# Patient Record
Sex: FEMALE | Race: WHITE | NOT HISPANIC OR LATINO | Employment: FULL TIME | URBAN - METROPOLITAN AREA
[De-identification: names, ages, dates, MRNs, and addresses within clinical notes are randomized per-mention and may not be internally consistent; named-entity substitution may affect disease eponyms.]

---

## 2020-05-19 ENCOUNTER — CLINICAL SUPPORT (OUTPATIENT)
Dept: CARDIOLOGY | Facility: CLINIC | Age: 60
End: 2020-05-19
Attending: INTERNAL MEDICINE
Payer: COMMERCIAL

## 2020-05-19 ENCOUNTER — LAB VISIT (OUTPATIENT)
Dept: LAB | Facility: HOSPITAL | Age: 60
End: 2020-05-19
Attending: INTERNAL MEDICINE
Payer: COMMERCIAL

## 2020-05-19 ENCOUNTER — IMMUNIZATION (OUTPATIENT)
Dept: PHARMACY | Facility: CLINIC | Age: 60
End: 2020-05-19
Payer: COMMERCIAL

## 2020-05-19 ENCOUNTER — OFFICE VISIT (OUTPATIENT)
Dept: PRIMARY CARE CLINIC | Facility: CLINIC | Age: 60
End: 2020-05-19
Payer: COMMERCIAL

## 2020-05-19 VITALS
HEART RATE: 80 BPM | BODY MASS INDEX: 28.88 KG/M2 | WEIGHT: 163 LBS | SYSTOLIC BLOOD PRESSURE: 200 MMHG | HEIGHT: 63 IN | DIASTOLIC BLOOD PRESSURE: 120 MMHG

## 2020-05-19 VITALS
TEMPERATURE: 98 F | DIASTOLIC BLOOD PRESSURE: 120 MMHG | SYSTOLIC BLOOD PRESSURE: 200 MMHG | HEIGHT: 63 IN | OXYGEN SATURATION: 99 % | RESPIRATION RATE: 18 BRPM | WEIGHT: 163.38 LBS | HEART RATE: 88 BPM | BODY MASS INDEX: 28.95 KG/M2

## 2020-05-19 DIAGNOSIS — Z20.822 EXPOSURE TO COVID-19 VIRUS: ICD-10-CM

## 2020-05-19 DIAGNOSIS — Z12.31 SCREENING MAMMOGRAM, ENCOUNTER FOR: ICD-10-CM

## 2020-05-19 DIAGNOSIS — Z11.59 NEED FOR HEPATITIS C SCREENING TEST: ICD-10-CM

## 2020-05-19 DIAGNOSIS — Z78.0 POSTMENOPAUSAL: ICD-10-CM

## 2020-05-19 DIAGNOSIS — Z11.4 ENCOUNTER FOR SCREENING FOR HIV: ICD-10-CM

## 2020-05-19 DIAGNOSIS — Z00.00 NORMAL PHYSICAL EXAM, ROUTINE: ICD-10-CM

## 2020-05-19 DIAGNOSIS — Z12.4 ENCOUNTER FOR PAPANICOLAOU SMEAR FOR CERVICAL CANCER SCREENING: ICD-10-CM

## 2020-05-19 DIAGNOSIS — Z23 NEED FOR TDAP VACCINATION: ICD-10-CM

## 2020-05-19 DIAGNOSIS — E66.3 OVERWEIGHT (BMI 25.0-29.9): ICD-10-CM

## 2020-05-19 DIAGNOSIS — Z13.220 SCREENING FOR LIPOID DISORDERS: ICD-10-CM

## 2020-05-19 DIAGNOSIS — Q24.9 CONGENITAL HEART ANOMALY: ICD-10-CM

## 2020-05-19 DIAGNOSIS — I16.0 HYPERTENSIVE URGENCY: Primary | ICD-10-CM

## 2020-05-19 LAB
ALBUMIN SERPL BCP-MCNC: 4.5 G/DL (ref 3.5–5.2)
ALP SERPL-CCNC: 91 U/L (ref 55–135)
ALT SERPL W/O P-5'-P-CCNC: 14 U/L (ref 10–44)
ANION GAP SERPL CALC-SCNC: 11 MMOL/L (ref 8–16)
ASCENDING AORTA: 2.94 CM
AST SERPL-CCNC: 22 U/L (ref 10–40)
AV INDEX (PROSTH): 0.79
AV MEAN GRADIENT: 7 MMHG
AV PEAK GRADIENT: 9 MMHG
AV VALVE AREA: 3.35 CM2
AV VELOCITY RATIO: 0.73
BASOPHILS # BLD AUTO: 0.04 K/UL (ref 0–0.2)
BASOPHILS NFR BLD: 0.5 % (ref 0–1.9)
BILIRUB SERPL-MCNC: 0.3 MG/DL (ref 0.1–1)
BSA FOR ECHO PROCEDURE: 1.81 M2
BUN SERPL-MCNC: 21 MG/DL (ref 8–23)
CALCIUM SERPL-MCNC: 9.7 MG/DL (ref 8.7–10.5)
CHLORIDE SERPL-SCNC: 106 MMOL/L (ref 95–110)
CHOLEST SERPL-MCNC: 253 MG/DL (ref 120–199)
CHOLEST/HDLC SERPL: 3.2 {RATIO} (ref 2–5)
CO2 SERPL-SCNC: 26 MMOL/L (ref 23–29)
CREAT SERPL-MCNC: 1.1 MG/DL (ref 0.5–1.4)
CV ECHO LV RWT: 0.52 CM
DIFFERENTIAL METHOD: ABNORMAL
DOP CALC AO PEAK VEL: 1.51 M/S
DOP CALC AO VTI: 30.8 CM
DOP CALC LVOT AREA: 4.3 CM2
DOP CALC LVOT DIAMETER: 2.33 CM
DOP CALC LVOT PEAK VEL: 1.1 M/S
DOP CALC LVOT STROKE VOLUME: 103.13 CM3
DOP CALCLVOT PEAK VEL VTI: 24.2 CM
E WAVE DECELERATION TIME: 220.79 MSEC
E/A RATIO: 0.62
E/E' RATIO: 11.09 M/S
ECHO LV POSTERIOR WALL: 0.91 CM (ref 0.6–1.1)
EOSINOPHIL # BLD AUTO: 0.1 K/UL (ref 0–0.5)
EOSINOPHIL NFR BLD: 1.1 % (ref 0–8)
ERYTHROCYTE [DISTWIDTH] IN BLOOD BY AUTOMATED COUNT: 12.5 % (ref 11.5–14.5)
EST. GFR  (AFRICAN AMERICAN): >60 ML/MIN/1.73 M^2
EST. GFR  (NON AFRICAN AMERICAN): 53.9 ML/MIN/1.73 M^2
FRACTIONAL SHORTENING: 37 % (ref 28–44)
GLUCOSE SERPL-MCNC: 98 MG/DL (ref 70–110)
HCT VFR BLD AUTO: 46.1 % (ref 37–48.5)
HDLC SERPL-MCNC: 78 MG/DL (ref 40–75)
HDLC SERPL: 30.8 % (ref 20–50)
HGB BLD-MCNC: 14.2 G/DL (ref 12–16)
IMM GRANULOCYTES # BLD AUTO: 0.02 K/UL (ref 0–0.04)
IMM GRANULOCYTES NFR BLD AUTO: 0.2 % (ref 0–0.5)
INTERVENTRICULAR SEPTUM: 1.1 CM (ref 0.6–1.1)
IVRT: 134.16 MSEC
LA MAJOR: 5.16 CM
LA MINOR: 5.25 CM
LA WIDTH: 4.19 CM
LDLC SERPL CALC-MCNC: 161.6 MG/DL (ref 63–159)
LEFT ATRIUM SIZE: 3.04 CM
LEFT ATRIUM VOLUME INDEX: 31.8 ML/M2
LEFT ATRIUM VOLUME: 56.35 CM3
LEFT INTERNAL DIMENSION IN SYSTOLE: 2.2 CM (ref 2.1–4)
LEFT VENTRICLE DIASTOLIC VOLUME INDEX: 28.24 ML/M2
LEFT VENTRICLE DIASTOLIC VOLUME: 50.06 ML
LEFT VENTRICLE MASS INDEX: 58 G/M2
LEFT VENTRICLE SYSTOLIC VOLUME INDEX: 9.1 ML/M2
LEFT VENTRICLE SYSTOLIC VOLUME: 16.11 ML
LEFT VENTRICULAR INTERNAL DIMENSION IN DIASTOLE: 3.48 CM (ref 3.5–6)
LEFT VENTRICULAR MASS: 103.21 G
LV LATERAL E/E' RATIO: 10.17 M/S
LV SEPTAL E/E' RATIO: 12.2 M/S
LYMPHOCYTES # BLD AUTO: 2.3 K/UL (ref 1–4.8)
LYMPHOCYTES NFR BLD: 27.1 % (ref 18–48)
MCH RBC QN AUTO: 28.7 PG (ref 27–31)
MCHC RBC AUTO-ENTMCNC: 30.8 G/DL (ref 32–36)
MCV RBC AUTO: 93 FL (ref 82–98)
MONOCYTES # BLD AUTO: 0.7 K/UL (ref 0.3–1)
MONOCYTES NFR BLD: 8.1 % (ref 4–15)
MV PEAK A VEL: 0.98 M/S
MV PEAK E VEL: 0.61 M/S
NEUTROPHILS # BLD AUTO: 5.3 K/UL (ref 1.8–7.7)
NEUTROPHILS NFR BLD: 63 % (ref 38–73)
NONHDLC SERPL-MCNC: 175 MG/DL
NRBC BLD-RTO: 0 /100 WBC
PISA TR MAX VEL: 2.26 M/S
PLATELET # BLD AUTO: 220 K/UL (ref 150–350)
PMV BLD AUTO: 13.7 FL (ref 9.2–12.9)
POTASSIUM SERPL-SCNC: 3.9 MMOL/L (ref 3.5–5.1)
PROT SERPL-MCNC: 7.9 G/DL (ref 6–8.4)
PULM VEIN S/D RATIO: 2.21
PV PEAK D VEL: 0.39 M/S
PV PEAK S VEL: 0.86 M/S
RA MAJOR: 4.28 CM
RA PRESSURE: 3 MMHG
RA WIDTH: 3.31 CM
RBC # BLD AUTO: 4.95 M/UL (ref 4–5.4)
RIGHT VENTRICULAR END-DIASTOLIC DIMENSION: 4.1 CM
SARS-COV-2 IGG SERPLBLD QL IA.RAPID: NEGATIVE
SINUS: 3.09 CM
SODIUM SERPL-SCNC: 143 MMOL/L (ref 136–145)
STJ: 2.6 CM
T4 FREE SERPL-MCNC: 1.3 NG/DL (ref 0.71–1.51)
TDI LATERAL: 0.06 M/S
TDI SEPTAL: 0.05 M/S
TDI: 0.06 M/S
TR MAX PG: 20 MMHG
TRICUSPID ANNULAR PLANE SYSTOLIC EXCURSION: 1.53 CM
TRIGL SERPL-MCNC: 67 MG/DL (ref 30–150)
TSH SERPL DL<=0.005 MIU/L-ACNC: 2.38 UIU/ML (ref 0.4–4)
TV REST PULMONARY ARTERY PRESSURE: 23 MMHG
WBC # BLD AUTO: 8.38 K/UL (ref 3.9–12.7)

## 2020-05-19 PROCEDURE — 3077F SYST BP >= 140 MM HG: CPT | Mod: CPTII,S$GLB,, | Performed by: INTERNAL MEDICINE

## 2020-05-19 PROCEDURE — 84439 ASSAY OF FREE THYROXINE: CPT

## 2020-05-19 PROCEDURE — 85025 COMPLETE CBC W/AUTO DIFF WBC: CPT

## 2020-05-19 PROCEDURE — 3077F PR MOST RECENT SYSTOLIC BLOOD PRESSURE >= 140 MM HG: ICD-10-PCS | Mod: CPTII,S$GLB,, | Performed by: INTERNAL MEDICINE

## 2020-05-19 PROCEDURE — 86803 HEPATITIS C AB TEST: CPT

## 2020-05-19 PROCEDURE — 99204 OFFICE O/P NEW MOD 45 MIN: CPT | Mod: S$GLB,,, | Performed by: INTERNAL MEDICINE

## 2020-05-19 PROCEDURE — 86703 HIV-1/HIV-2 1 RESULT ANTBDY: CPT

## 2020-05-19 PROCEDURE — 3008F BODY MASS INDEX DOCD: CPT | Mod: CPTII,S$GLB,, | Performed by: INTERNAL MEDICINE

## 2020-05-19 PROCEDURE — 93306 TTE W/DOPPLER COMPLETE: CPT | Mod: S$GLB,,, | Performed by: INTERNAL MEDICINE

## 2020-05-19 PROCEDURE — 93306 ECHO (CUPID ONLY): ICD-10-PCS | Mod: S$GLB,,, | Performed by: INTERNAL MEDICINE

## 2020-05-19 PROCEDURE — 80053 COMPREHEN METABOLIC PANEL: CPT

## 2020-05-19 PROCEDURE — 3008F PR BODY MASS INDEX (BMI) DOCUMENTED: ICD-10-PCS | Mod: CPTII,S$GLB,, | Performed by: INTERNAL MEDICINE

## 2020-05-19 PROCEDURE — 36415 COLL VENOUS BLD VENIPUNCTURE: CPT | Mod: PN

## 2020-05-19 PROCEDURE — 99999 PR PBB SHADOW E&M-EST. PATIENT-LVL II: ICD-10-PCS | Mod: PBBFAC,,,

## 2020-05-19 PROCEDURE — 3080F PR MOST RECENT DIASTOLIC BLOOD PRESSURE >= 90 MM HG: ICD-10-PCS | Mod: CPTII,S$GLB,, | Performed by: INTERNAL MEDICINE

## 2020-05-19 PROCEDURE — 3080F DIAST BP >= 90 MM HG: CPT | Mod: CPTII,S$GLB,, | Performed by: INTERNAL MEDICINE

## 2020-05-19 PROCEDURE — 80061 LIPID PANEL: CPT

## 2020-05-19 PROCEDURE — 99999 PR PBB SHADOW E&M-EST. PATIENT-LVL II: CPT | Mod: PBBFAC,,,

## 2020-05-19 PROCEDURE — 84443 ASSAY THYROID STIM HORMONE: CPT

## 2020-05-19 PROCEDURE — 99999 PR PBB SHADOW E&M-EST. PATIENT-LVL V: ICD-10-PCS | Mod: PBBFAC,,, | Performed by: INTERNAL MEDICINE

## 2020-05-19 PROCEDURE — 99204 PR OFFICE/OUTPT VISIT, NEW, LEVL IV, 45-59 MIN: ICD-10-PCS | Mod: S$GLB,,, | Performed by: INTERNAL MEDICINE

## 2020-05-19 PROCEDURE — 99999 PR PBB SHADOW E&M-EST. PATIENT-LVL V: CPT | Mod: PBBFAC,,, | Performed by: INTERNAL MEDICINE

## 2020-05-19 PROCEDURE — 86769 SARS-COV-2 COVID-19 ANTIBODY: CPT

## 2020-05-19 RX ORDER — MAGNESIUM 200 MG
TABLET ORAL
COMMUNITY
Start: 2019-05-01 | End: 2020-05-19

## 2020-05-19 RX ORDER — ACETAMINOPHEN 500 MG
TABLET ORAL
COMMUNITY
Start: 2019-05-01 | End: 2020-05-19

## 2020-05-19 RX ORDER — AMLODIPINE BESYLATE 10 MG/1
10 TABLET ORAL DAILY
Qty: 30 TABLET | Refills: 0 | Status: SHIPPED | OUTPATIENT
Start: 2020-05-19 | End: 2020-06-14 | Stop reason: SDUPTHER

## 2020-05-19 NOTE — PROGRESS NOTES
Reviewed Echo.  Called pt.  No answer.  Left message to return call.  I would like to d/w pt  · Concentric left ventricular remodeling.  · Normal left ventricular systolic function. The estimated ejection fraction is 65%.  · Grade I (mild) left ventricular diastolic dysfunction consistent with impaired relaxation.  · Mild tricuspid regurgitation.  · Mildly reduced right ventricular systolic function.  · Mild right ventricular enlargement.  · With note of potential ASD repair - inter-atrial septum does appear thickened and hyperechoic which could represent repair. No flow by color doppler. No evidence of significant RV dilation or dysfunction.    Dr. UGARTE

## 2020-05-19 NOTE — PROGRESS NOTES
[facility] Note    Chief Complaint      Chief Complaint   Patient presents with    Hypertension       History of Present Illness      Cary Claudio is a 60 y.o. HTN, Anemia, Duodenal ulcer presents to est PCP and to fu HTN.     Pt requests COVID Ab test because she had AH and rash Robert without fever or other symptoms.  I will put in an order.  Pt aware this may or may not be covered by her insurance. Also, a positive result does not meanshe can't get COVID -19 again.  This virus is very new and we are still learning about it.  There is not enough information at this time to determine what defines COVID - 19 immunity and how long immunity would last.  She should still socially distance. A positive result indicates she has had the virus    HTN - Pt was prev on Norvasc 10 mg/d, HCTZ 25 mg, lisinopril 30 mg/d. She ran out 5 yrs ago when she lost her insurance. Will resume Norvasc. Check labs today. Will likely need to add Lisinopril back.  Monitor for any swelling.  Rec low sodium diet.     Anemia - Will check CBC.  This was when she had bleeding ulcers in past?    Duodenal ulcer - Pt prev on Omeprazole. Rec Avoid NSAIDS.     Overweight  BMI - 28.9 - Pt walks her dog most days and does some madalyn 4-5 times/wk. She plays tennis intermittently.    HCM - Flu - none;  Tdap - due- admin 5/19/20;  PCV 13 - none;  PVX 23 - none;  Shingrix - none; Zostavax - 10/8/14;  MGM - due;  DEXA - due;  PAP - > 5 yrs - due; pt s/p partial Hys; Hep C Screen - ;  HIV Screen - ; C-scope - 11/21/14 - repeat 10 yrs; Prev PCP - Dr. Rutledge; [facility] [place]    Past Medical History:  Past Medical History:   Diagnosis Date    Cardiac anomaly, congenital     pt had a repair for ?ASD '98 at [facility] with Dr. Aguilar    Congenital heart anomaly     Fibroids     uterine     History of migraine     Hypertension     Insomnia     Nicotine dependence in remission     PUD (peptic ulcer disease)        Past  "Surgical History:   has a past surgical history that includes repair of congenital heart disease (age 38 yrs ).    Family History:  family history includes Diabetes in her brother; Heart disease in her father and mother; Hypertension in her mother; Rheum arthritis in her sister.     Social History:  Social History     Tobacco Use    Smoking status: Former Smoker     Types: Cigarettes     Last attempt to quit: 7/8/2004     Years since quitting: 15.8    Smokeless tobacco: Never Used    Tobacco comment: a few cig on occasion x 2 yrs   Substance Use Topics    Alcohol use: Yes     Alcohol/week: 5.0 standard drinks     Types: 5 Glasses of wine per week     Frequency: 4 or more times a week     Drinks per session: 1 or 2     Binge frequency: Never    Drug use: No       Review of Systems   Constitutional: Positive for malaise/fatigue. Negative for chills, diaphoresis and fever.   HENT: Negative for congestion, hearing loss, sore throat and tinnitus.    Eyes: Positive for blurred vision. Negative for double vision.        Wears glasses.  Worse with elevated BP. Last saw Ophtho 1 yr ago.    Respiratory: Positive for shortness of breath. Negative for cough and wheezing.         "If I walk a lot".  She can walk a few miles and gets SOB at the beginning which self resolves.    Cardiovascular: Positive for chest pain. Negative for palpitations, orthopnea, leg swelling and PND.   Gastrointestinal: Negative for abdominal pain, blood in stool, constipation, diarrhea, heartburn, melena, nausea and vomiting.   Genitourinary: Negative for dysuria and frequency.   Musculoskeletal: Positive for back pain. Negative for joint pain, myalgias and neck pain.        Hands and feet swell intermittently. Low back pain - intermittently.  Does not radiate.   Skin: Negative for itching and rash.   Neurological: Positive for headaches. Negative for dizziness and tingling.        1 HA/wk x  1 hr.   Endo/Heme/Allergies: Does not bruise/bleed " easily.   Psychiatric/Behavioral: Positive for depression. The patient is nervous/anxious.         Anxiety > Depression given COVID pandemic and due to work environment. She does not feel she needs further intervention at this time. She has trouble sleeping x a couple yrs.  Has good sleep hygiene        Medications:  Outpatient Encounter Medications as of 5/19/2020   Medication Sig Dispense Refill    amLODIPine (NORVASC) 10 MG tablet Take 1 tablet (10 mg total) by mouth once daily. 30 tablet 0    hydrochlorothiazide (HYDRODIURIL) 25 MG tablet TAKE 1 TABLET BY MOUTH ONCE DAILY (Patient not taking: Reported on 5/19/2020) 90 tablet 0    lisinopril (PRINIVIL,ZESTRIL) 30 MG tablet TAKE 1 TABLET BY MOUTH ONCE DAILY (Patient not taking: Reported on 5/19/2020) 30 tablet 3    [DISCONTINUED] amlodipine (NORVASC) 10 MG tablet TAKE 1 TABLET BY MOUTH DAILY (Patient not taking: Reported on 5/19/2020) 90 tablet 0    [DISCONTINUED] cholecalciferol, vitamin D3, (VITAMIN D3) 125 mcg (5,000 unit) Tab       [DISCONTINUED] magnesium 200 mg Tab       [DISCONTINUED] omeprazole (PRILOSEC) 40 MG capsule Take 1 capsule (40 mg total) by mouth once daily. (Patient not taking: Reported on 5/19/2020) 30 capsule 11    [DISCONTINUED] rye grass extract-quercetin 500-250 mg Tab       [DISCONTINUED] VITAMIN K2 ORAL        No facility-administered encounter medications on file as of 5/19/2020.        Current Outpatient Medications:     amLODIPine (NORVASC) 10 MG tablet, Take 1 tablet (10 mg total) by mouth once daily., Disp: 30 tablet, Rfl: 0    diphth,pertus,acell,,tetanus (BOOSTRIX TDAP) 2.5-8-5 Lf-mcg-Lf/0.5mL Syrg injection, Inject 0.5 mLs into the muscle once. For one dose. for 1 dose, Disp: 0.5 mL, Rfl: 0    hydrochlorothiazide (HYDRODIURIL) 25 MG tablet, TAKE 1 TABLET BY MOUTH ONCE DAILY (Patient not taking: Reported on 5/19/2020), Disp: 90 tablet, Rfl: 0    lisinopril (PRINIVIL,ZESTRIL) 30 MG tablet, TAKE 1 TABLET BY MOUTH ONCE  "DAILY (Patient not taking: Reported on 5/19/2020), Disp: 30 tablet, Rfl: 3    Allergies:  Review of patient's allergies indicates:   Allergen Reactions    Compazine [prochlorperazine] Swelling     Tongue swelling    Pcn [penicillins] Swelling     Tongue swelling       Health Maintenance:  Immunization History   Administered Date(s) Administered    Influenza - Quadrivalent 10/08/2014    Tdap 05/19/2020    Zoster 10/08/2014      Health Maintenance   Topic Date Due    Hepatitis C Screening  1960    TETANUS VACCINE  02/23/1976    Pap Smear with HPV Cotest  02/23/1979    Mammogram  02/23/1998    Lipid Panel  07/08/2015    DEXA SCAN  12/31/2016    Colonoscopy  11/21/2024      Objective:      Vital Signs  Temp: 98 °F (36.7 °C)  Pulse: 88  Resp: 18  SpO2: 99 %  BP: (!) 200/120  BP Location: Left arm  Patient Position: Sitting  Pain Score: 0-No pain  Height and Weight  Height: 5' 3" (160 cm)  Weight: 74.1 kg (163 lb 5.8 oz)  BSA (Calculated - sq m): 1.81 sq meters  BMI (Calculated): 28.9  Weight in (lb) to have BMI = 25: 140.8]    Laboratory:      Other:   Lab Results   Component Value Date    FERRITIN 35 12/29/2014       Physical Exam   Constitutional: She is oriented to person, place, and time. She appears well-developed and well-nourished. No distress.   HENT:   Head: Normocephalic and atraumatic.   Right Ear: External ear normal.   Left Ear: External ear normal.   Eyes: Pupils are equal, round, and reactive to light. Conjunctivae and EOM are normal.   Neck: Normal range of motion. Neck supple. No thyromegaly present.   Cardiovascular: Normal rate, regular rhythm, normal heart sounds and intact distal pulses.   No murmur heard.  Pulmonary/Chest: Effort normal and breath sounds normal. No respiratory distress.   Abdominal: Soft. Bowel sounds are normal. She exhibits no distension.   Musculoskeletal: Normal range of motion.   Neurological: She is alert and oriented to person, place, and time.   Skin: Skin " is warm and dry. She is not diaphoretic.   Psychiatric: She has a normal mood and affect.   Nursing note and vitals reviewed.          Assessment:       1. Hypertensive urgency    2. Overweight (BMI 25.0-29.9)    3. Congenital heart anomaly    4. Postmenopausal    5. Need for Tdap vaccination    6. Exposure to Covid-19 Virus    7. Encounter for screening for HIV    8. Need for hepatitis C screening test    9. Screening mammogram, encounter for    10. Encounter for Papanicolaou smear for cervical cancer screening    11. Normal physical exam, routine    12. Screening for lipoid disorders        Cary Claudio is a 60 y.o.female with:    1. Hypertensive urgency  - amLODIPine (NORVASC) 10 MG tablet; Take 1 tablet (10 mg total) by mouth once daily.  Dispense: 30 tablet; Refill: 0  -rec low sodium diet.  Will resume Norvasc 10 mg/d.  Monitor for edema.  Pt will call with any concerns.  Will check renal function.  RTC in 2 wks.  Keep BP log.  Will likely need to add 2nd agent back such as HCTZ or Lisinopril.     2. Overweight (BMI 25.0-29.9)  -Rec diet and cont walking.     3. Congenital heart anomaly  - Echo Color Flow Doppler? Yes; Future  -Will check Echo.  Pt unsure of prev Surgical repair.  ?ASD repair. She reports some intermittent DAN at the beginning of activity    4. Postmenopausal  - DXA Bone Density Spine And Hip; Future  -Check DEXA.     5. Need for Tdap vaccination  -Admin today.     6. Exposure to Covid-19 Virus  - COVID-19 (SARS CoV-2) IgG Antibody; Future  -I  put in an order.  Pt aware a positive result does not mean you can't get COVID -19 again.  This virus is very new and we are still learning about it.  There is not enough information at this time to determine what defines COVID - 19 immunity and how long immunity would last.  She should still socially distance. A positive result indicates she has had the virus    7. Encounter for screening for HIV  - HIV 1/2 Ag/Ab (4th Gen); Future    8. Need for  hepatitis C screening test  - Hepatitis C Antibody; Future    9. Screening mammogram, encounter for  - Mammo Digital Screening Bilat; Future    10. Encounter for Papanicolaou smear for cervical cancer screening  - Ambulatory referral/consult to Obstetrics / Gynecology; Future  -Refer to OB for PAP.     Other orders  - CBC auto differential; Future  - Comprehensive metabolic panel; Future  - T4, free; Future  - TSH; Future  - Lipid Panel; Future       Chronic conditions status updated as per HPI.  Other than changes above, cont current medications and maintain follow up with specialists.  Return to clinic in 2 wks    Nicole Rich MD  [facility]                Answers for HPI/ROS submitted by the patient on 5/15/2020   Hypertension  Chronicity: chronic  Onset: more than 1 year ago  Progression since onset: gradually worsening  Condition status: uncontrolled  anxiety: Yes  peripheral edema: No  sweats: No  Agents associated with hypertension: NSAIDs  CAD risks: family history, obesity, post-menopausal state, stress  Compliance problems: medication cost, medication side effects  Past treatments: beta blockers, diuretics  Improvement on treatment: mild

## 2020-05-20 ENCOUNTER — TELEPHONE (OUTPATIENT)
Dept: PRIMARY CARE CLINIC | Facility: CLINIC | Age: 60
End: 2020-05-20

## 2020-05-20 LAB
HCV AB SERPL QL IA: NEGATIVE
HIV 1+2 AB+HIV1 P24 AG SERPL QL IA: NEGATIVE

## 2020-05-20 NOTE — TELEPHONE ENCOUNTER
----- Message from Nicole Rich MD sent at 5/19/2020  5:48 PM CDT -----  Reviewed Echo.  Called pt.  No answer.  Left message to return call.  I would like to d/w pt  · Concentric left ventricular remodeling.  · Normal left ventricular systolic function. The estimated ejection fraction is 65%.  · Grade I (mild) left ventricular diastolic dysfunction consistent with impaired relaxation.  · Mild tricuspid regurgitation.  · Mildly reduced right ventricular systolic function.  · Mild right ventricular enlargement.  · With note of potential ASD repair - inter-atrial septum does appear thickened and hyperechoic which could represent repair. No flow by color doppler. No evidence of significant RV dilation or dysfunction.     Dr. UGARTE

## 2020-05-20 NOTE — PROGRESS NOTES
I d/w pt.  Her Hep C screen - neg.  HIV - neg.  TFT's - wnl.  TG 67 HDL 78 .  Rec low chol diet. Repeat in 4 mos.  Consider statin if still elevated. Renal function sl low BUN/Cr - 21/1.1.  I rec inc hydration.  COVID ab - neg.     Dr. UGARTE

## 2020-06-01 ENCOUNTER — OFFICE VISIT (OUTPATIENT)
Dept: OBSTETRICS AND GYNECOLOGY | Facility: CLINIC | Age: 60
End: 2020-06-01
Payer: COMMERCIAL

## 2020-06-01 ENCOUNTER — LAB VISIT (OUTPATIENT)
Dept: LAB | Facility: HOSPITAL | Age: 60
End: 2020-06-01
Attending: OBSTETRICS & GYNECOLOGY
Payer: COMMERCIAL

## 2020-06-01 VITALS
DIASTOLIC BLOOD PRESSURE: 92 MMHG | WEIGHT: 163.38 LBS | HEIGHT: 63 IN | SYSTOLIC BLOOD PRESSURE: 172 MMHG | BODY MASS INDEX: 28.95 KG/M2

## 2020-06-01 DIAGNOSIS — B37.9 CANDIDIASIS: ICD-10-CM

## 2020-06-01 DIAGNOSIS — N95.1 SYMPTOMATIC MENOPAUSAL OR FEMALE CLIMACTERIC STATES: ICD-10-CM

## 2020-06-01 DIAGNOSIS — Z12.4 ENCOUNTER FOR PAPANICOLAOU SMEAR FOR CERVICAL CANCER SCREENING: Primary | ICD-10-CM

## 2020-06-01 PROCEDURE — 87624 HPV HI-RISK TYP POOLED RSLT: CPT

## 2020-06-01 PROCEDURE — 99386 PREV VISIT NEW AGE 40-64: CPT | Mod: S$GLB,,, | Performed by: OBSTETRICS & GYNECOLOGY

## 2020-06-01 PROCEDURE — 99386 PR PREVENTIVE VISIT,NEW,40-64: ICD-10-PCS | Mod: S$GLB,,, | Performed by: OBSTETRICS & GYNECOLOGY

## 2020-06-01 PROCEDURE — 3080F PR MOST RECENT DIASTOLIC BLOOD PRESSURE >= 90 MM HG: ICD-10-PCS | Mod: CPTII,S$GLB,, | Performed by: OBSTETRICS & GYNECOLOGY

## 2020-06-01 PROCEDURE — 3077F SYST BP >= 140 MM HG: CPT | Mod: CPTII,S$GLB,, | Performed by: OBSTETRICS & GYNECOLOGY

## 2020-06-01 PROCEDURE — 88175 CYTOPATH C/V AUTO FLUID REDO: CPT

## 2020-06-01 PROCEDURE — 84403 ASSAY OF TOTAL TESTOSTERONE: CPT

## 2020-06-01 PROCEDURE — 3080F DIAST BP >= 90 MM HG: CPT | Mod: CPTII,S$GLB,, | Performed by: OBSTETRICS & GYNECOLOGY

## 2020-06-01 PROCEDURE — 99999 PR PBB SHADOW E&M-EST. PATIENT-LVL III: ICD-10-PCS | Mod: PBBFAC,,, | Performed by: OBSTETRICS & GYNECOLOGY

## 2020-06-01 PROCEDURE — 84402 ASSAY OF FREE TESTOSTERONE: CPT

## 2020-06-01 PROCEDURE — 3077F PR MOST RECENT SYSTOLIC BLOOD PRESSURE >= 140 MM HG: ICD-10-PCS | Mod: CPTII,S$GLB,, | Performed by: OBSTETRICS & GYNECOLOGY

## 2020-06-01 PROCEDURE — 99999 PR PBB SHADOW E&M-EST. PATIENT-LVL III: CPT | Mod: PBBFAC,,, | Performed by: OBSTETRICS & GYNECOLOGY

## 2020-06-01 PROCEDURE — 82670 ASSAY OF TOTAL ESTRADIOL: CPT

## 2020-06-01 RX ORDER — FLUCONAZOLE 150 MG/1
150 TABLET ORAL DAILY
Qty: 5 TABLET | Refills: 0 | Status: SHIPPED | OUTPATIENT
Start: 2020-06-01 | End: 2020-06-06

## 2020-06-01 NOTE — PROGRESS NOTES
[facility] Note    Chief Complaint      Chief Complaint   Patient presents with    Follow-up       History of Present Illness      Cary Claudio is a 60 y.o.  HTN, Anemia, Duodenal ulcer presents to fu HTN. Last visit -5/19/20    HLD -  TG 67 HDL 78 .  Rec low chol diet. Repeat in 4 mos. Consider statin if still elevated.     Echo -5/19/20 -  Concentric left ventricular remodeling, EF - 65%. Grade I (mild) left ventricular diastolic dysfunction consistent with impaired relaxation. Mild TR. Mildly reduced right ventricular systolic function. Mild Ventricular Enlargement. Note - potential ASD repair - inter-atrial septum does appear thickened and hyperechoic which could represent repair. No flow by color doppler. No evidence of significant RV dilation or dysfunction.     HTN - Pt was prev on Norvasc 10 mg/d, HCTZ 25 mg, lisinopril 30 mg/d. She ran out 5 yrs ago when she lost her insurance. Pt currently on Norvasc 10 mg/d Will  add Lisinopril back at 10 mg/d.  Monitor for any swelling.  Rec low sodium diet. She will fu in 6 wks but will call in 2 wks with BP log. BUN/Cr - 21/1.1.     Anemia - H/H - 14/46. Resolved This was when she had bleeding ulcers in past?     Duodenal ulcer - Pt prev on Omeprazole. Rec Avoid NSAIDS.      Overweight  BMI - 29.1 - Pt walks her dog most days and does some madalyn 4-5 times/wk. She plays tennis intermittently. She gained 30 lb in 4 yrs.  She is planning to get hormone pellets.      HCM - Flu - none;  Tdap - 5/19/20;  PCV 13 - none;  PVX 23 - none;  Shingrix - none; Zostavax - 10/8/14;  MGM - due;  DEXA - due;  PAP - > 5 yrs - due; pt s/p partial Hys; Hep C Screen - neg;  HIV Screen - neg; C-scope - 11/21/14 - repeat 10 yrs; Prev PCP - Dr. Rutledge; [facility] [place]     Past Medical History:  Past Medical History:   Diagnosis Date    Anemia     Cardiac anomaly, congenital     pt had a repair for ?ASD '98 at [facility] with Dr. Aguilar     Congenital heart anomaly     Fibroids     uterine     History of migraine     Hypertension     Insomnia     Nicotine dependence in remission     PUD (peptic ulcer disease)        Past Surgical History:   has a past surgical history that includes repair of congenital heart disease (age 38 yrs ) and Hysterectomy (2014).    Family History:  family history includes Diabetes in her brother; Heart disease in her father and mother; Hypertension in her mother; Rheum arthritis in her sister.     Social History:  Social History     Tobacco Use    Smoking status: Former Smoker     Types: Cigarettes     Last attempt to quit: 7/8/2004     Years since quitting: 15.9    Smokeless tobacco: Never Used    Tobacco comment: a few cig on occasion x 2 yrs   Substance Use Topics    Alcohol use: Yes     Alcohol/week: 5.0 standard drinks     Types: 5 Glasses of wine per week     Frequency: 4 or more times a week     Drinks per session: 1 or 2     Binge frequency: Never    Drug use: No       Review of Systems   Constitutional: Negative for malaise/fatigue.   Eyes: Negative for blurred vision.   Respiratory: Negative for shortness of breath.    Cardiovascular: Negative for chest pain, palpitations, orthopnea and PND.   Gastrointestinal: Negative for nausea and vomiting.   Genitourinary: Negative for dysuria and frequency.   Musculoskeletal: Negative for neck pain.   Neurological: Negative for dizziness and headaches.   Psychiatric/Behavioral: Negative for depression. The patient is not nervous/anxious.         She left hostile work environment recently. Not sleeping so well - rec OTC meltanonin        Medications:  Outpatient Encounter Medications as of 6/2/2020   Medication Sig Dispense Refill    amLODIPine (NORVASC) 10 MG tablet Take 1 tablet (10 mg total) by mouth once daily. 30 tablet 0    fluconazole (DIFLUCAN) 150 MG Tab Take 1 tablet (150 mg total) by mouth once daily. for 5 days (Patient not taking: Reported on  "6/2/2020) 5 tablet 0    lisinopriL 10 MG tablet Take 3 tablets (30 mg total) by mouth once daily. 30 tablet 1    [DISCONTINUED] hydrochlorothiazide (HYDRODIURIL) 25 MG tablet TAKE 1 TABLET BY MOUTH ONCE DAILY (Patient not taking: Reported on 5/19/2020) 90 tablet 0    [DISCONTINUED] lisinopril (PRINIVIL,ZESTRIL) 30 MG tablet TAKE 1 TABLET BY MOUTH ONCE DAILY (Patient not taking: Reported on 5/19/2020) 30 tablet 3     No facility-administered encounter medications on file as of 6/2/2020.        Current Outpatient Medications:     amLODIPine (NORVASC) 10 MG tablet, Take 1 tablet (10 mg total) by mouth once daily., Disp: 30 tablet, Rfl: 0    fluconazole (DIFLUCAN) 150 MG Tab, Take 1 tablet (150 mg total) by mouth once daily. for 5 days (Patient not taking: Reported on 6/2/2020), Disp: 5 tablet, Rfl: 0    lisinopriL 10 MG tablet, Take 3 tablets (30 mg total) by mouth once daily., Disp: 30 tablet, Rfl: 1    Allergies:  Review of patient's allergies indicates:   Allergen Reactions    Compazine [prochlorperazine] Swelling     Tongue swelling    Pcn [penicillins] Swelling     Tongue swelling       Health Maintenance:  Immunization History   Administered Date(s) Administered    Influenza - Quadrivalent 10/08/2014    Tdap 05/19/2020    Zoster 10/08/2014      Health Maintenance   Topic Date Due    Mammogram  02/23/1998    DEXA SCAN  12/31/2016    Lipid Panel  05/19/2021    Colonoscopy  11/21/2024    TETANUS VACCINE  05/19/2030    Hepatitis C Screening  Completed      Objective:      Vital Signs  Pulse: 94  Resp: 18  SpO2: 99 %  BP: (!) 160/81  BP Location: Right arm  Patient Position: Sitting  Pain Score: 0-No pain  Height and Weight  Height: 5' 3" (160 cm)  Weight: 74.6 kg (164 lb 7.4 oz)  BSA (Calculated - sq m): 1.82 sq meters  BMI (Calculated): 29.1  Weight in (lb) to have BMI = 25: 140.8]    Laboratory:  CBC:  Recent Labs   Lab 05/19/20  1125   WBC 8.38   RBC 4.95   Hemoglobin 14.2   Hematocrit 46.1 "   Platelets 220   Mean Corpuscular Volume 93   Mean Corpuscular Hemoglobin 28.7   Mean Corpuscular Hemoglobin Conc 30.8 L       CMP:  Recent Labs   Lab 05/19/20  1125   Glucose 98   Calcium 9.7   Albumin 4.5   Total Protein 7.9   Sodium 143   Potassium 3.9   CO2 26   Chloride 106   BUN, Bld 21   Creatinine 1.1   eGFR if African American >60.0   eGFR if non African American 53.9 A   Alkaline Phosphatase 91   ALT 14   AST 22   Total Bilirubin 0.3       URINALYSIS:              LIPIDS:  Recent Labs   Lab 05/19/20  1125   TSH 2.376   HDL 78 H   Cholesterol 253 H   Triglycerides 67   LDL Cholesterol 161.6 H   Hdl/Cholesterol Ratio 30.8   Non-HDL Cholesterol 175   Total Cholesterol/HDL Ratio 3.2       TSH:  Recent Labs   Lab 05/19/20  1125   TSH 2.376       Other:   Lab Results   Component Value Date    TOTALTESTOST 20 06/01/2020    ESTRADIOL <10 (A) 06/01/2020    FERRITIN 35 12/29/2014     Lab Results   Component Value Date    HEPCAB Negative 05/19/2020       Physical Exam   Constitutional: She appears well-developed and well-nourished. No distress.   Skin: Skin is warm and dry. She is not diaphoretic.   Psychiatric: She has a normal mood and affect.   Nursing note and vitals reviewed.          Assessment:       1. Hypertension, unspecified type    2. Hyperlipidemia, unspecified hyperlipidemia type    3. Overweight (BMI 25.0-29.9)        Cary Claudio is a 60 y.o.female with:    1. Hypertension, unspecified type  - lisinopriL 10 MG tablet; Take 3 tablets (30 mg total) by mouth once daily.  Dispense: 30 tablet; Refill: 1  - Basic metabolic panel; Future  -Improved but uncontrolled.  Cont Amlodipine and monitor for edema.  Will add Lisinopril 10 mg/d to regimen.  Repeat BMP in 2 wks.  Pt will call with BP log in 2 wks.  Fu in 4 wks. Note - pt may be out of insurance by July 1.     2. Hyperlipidemia, unspecified hyperlipidemia type  -Rec low chol diet.  Repeat FLP in 4 mos.     3. Overweight (BMI 25.0-29.9)  -Rec low chol  diet.        Chronic conditions status updated as per HPI.  Other than changes above, cont current medications and maintain follow up with specialists.  Return to clinic in 4 wks or sooner if needed.    Nicole Rich MD  [facility]                Answers for HPI/ROS submitted by the patient on 5/30/2020   Hypertension  Chronicity: chronic  Onset: more than 1 year ago  Progression since onset: gradually improving  Condition status: resistant  anxiety: Yes  peripheral edema: No  sweats: No  Agents associated with hypertension: no associated agents  CAD risks: family history, obesity, post-menopausal state, stress  Compliance problems: medication cost  Past treatments: diuretics, lifestyle changes  Improvement on treatment: mild

## 2020-06-01 NOTE — LETTER
June 1, 2020      Nicole Rich MD  [address] [facility] [address]           [facility] - Obstetrics and Gynecology  [address] [place]  [facility] [address]  Phone: [phone]  Fax: [phone]          Patient: Cary Claudio   MR Number: [id number]   YOB: 1960   Date of Visit: 6/1/2020       Dear Dr. Nicole Rich:    Thank you for referring Cary Claudio to me for evaluation. Attached you will find relevant portions of my assessment and plan of care.    If you have questions, please do not hesitate to call me. I look forward to following Cary Claudio along with you.    Sincerely,    Lisa Bray MD    Enclosure  CC:  No Recipients    If you would like to receive this communication electronically, please contact [email or web address] or [phone] to request more information on [employer] Link access.    For providers and/or their staff who would like to refer a patient to [facility], please contact us through our one-stop-shop provider referral line, [facility], at [phone].    If you feel you have received this communication in error or would no longer like to receive these types of communications, please e-mail [email or web address]

## 2020-06-01 NOTE — PROGRESS NOTES
History & Physical  Gynecology      SUBJECTIVE:     Chief Complaint: Annual Exam       History of Present Illness:  SUBJECTIVE:   Cary Claudio is a 60 y.o. female   for annual routine Pap and checkup. No LMP recorded (lmp unknown). Patient is postmenopausal..  Pt complaints of increased weight gain since her hysterectomy.  Notes average 10 # weight gain per year.  Also notes decreased libido.  Pt's hx complicated by HTN, ASD s/p repair.    denies break through bleeding.   denies vaginal itching or irritation.  denies vaginal discharge.    She is sexually active with 1 partner  She uses hysterectomy for contraception.    History of abnormal pap: No  Last Pap:   Last MMG: Yes - ordered by PCP  Last Colonoscopy:  No        Review of patient's allergies indicates:   Allergen Reactions    Compazine [prochlorperazine] Swelling     Tongue swelling    Pcn [penicillins] Swelling     Tongue swelling       Past Medical History:   Diagnosis Date    Anemia     Cardiac anomaly, congenital     pt had a repair for ?ASD '[facility] at [facility] with Dr. Aguilar    Congenital heart anomaly     Fibroids     uterine     History of migraine     Hypertension     Insomnia     Nicotine dependence in remission     PUD (peptic ulcer disease)      Past Surgical History:   Procedure Laterality Date    HYSTERECTOMY      repair of congenital heart disease  age 38 yrs      OB History        2    Para   2    Term   2            AB        Living   2       SAB        TAB        Ectopic        Multiple        Live Births                   Family History   Problem Relation Age of Onset    Heart disease Mother     Hypertension Mother     Heart disease Father     Rheum arthritis Sister     Diabetes Brother     Breast cancer Neg Hx     Colon cancer Neg Hx     Ovarian cancer Neg Hx      Social History     Tobacco Use    Smoking status: Former Smoker     Types: Cigarettes     Last attempt to quit: 2004      Years since quitting: 15.9    Smokeless tobacco: Never Used    Tobacco comment: a few cig on occasion x 2 yrs   Substance Use Topics    Alcohol use: Yes     Alcohol/week: 5.0 standard drinks     Types: 5 Glasses of wine per week     Frequency: 4 or more times a week     Drinks per session: 1 or 2     Binge frequency: Never    Drug use: No       Current Outpatient Medications   Medication Sig    amLODIPine (NORVASC) 10 MG tablet Take 1 tablet (10 mg total) by mouth once daily.    fluconazole (DIFLUCAN) 150 MG Tab Take 1 tablet (150 mg total) by mouth once daily. for 5 days    hydrochlorothiazide (HYDRODIURIL) 25 MG tablet TAKE 1 TABLET BY MOUTH ONCE DAILY (Patient not taking: Reported on 5/19/2020)    lisinopril (PRINIVIL,ZESTRIL) 30 MG tablet TAKE 1 TABLET BY MOUTH ONCE DAILY (Patient not taking: Reported on 5/19/2020)     No current facility-administered medications for this visit.          Review of Systems:  Review of Systems   Constitutional: Negative for activity change, appetite change, fatigue, fever and unexpected weight change.   Respiratory: Negative for cough and shortness of breath.    Cardiovascular: Negative for chest pain and leg swelling.   Gastrointestinal: Negative for abdominal pain, blood in stool, constipation, diarrhea, nausea and vomiting.   Endocrine: Negative for diabetes, hair loss and hot flashes.   Genitourinary: Negative for pelvic pain, postcoital bleeding and postmenopausal bleeding.   Musculoskeletal: Negative for back pain.   Integumentary:  Negative for hair changes, breast mass, nipple discharge and breast skin changes.   Psychiatric/Behavioral: Negative for sleep disturbance. The patient is not nervous/anxious.    Breast: Negative for mass, mastodynia, nipple discharge and skin changes       OBJECTIVE:     Physical Exam:  Physical Exam   Constitutional: She is oriented to person, place, and time. She appears well-developed and well-nourished.   HENT:   Head: Normocephalic  and atraumatic.   Eyes: Conjunctivae are normal. Right eye exhibits no discharge. Left eye exhibits no discharge. No scleral icterus.   Pulmonary/Chest: Effort normal. No stridor. She exhibits no mass, no tenderness and no bony tenderness. Right breast exhibits no inverted nipple, no mass, no nipple discharge, no skin change and no tenderness. Left breast exhibits no inverted nipple, no mass, no nipple discharge, no skin change and no tenderness. No breast swelling, tenderness, discharge or bleeding. Breasts are symmetrical.   Abdominal: Soft. She exhibits no distension. There is no tenderness.   Genitourinary: No breast swelling, tenderness, discharge or bleeding. No labial fusion. There is no rash, tenderness, lesion or injury on the right labia. There is no rash, tenderness, lesion or injury on the left labia. Cervix exhibits no motion tenderness, no discharge and no friability. Right adnexum displays no mass, no tenderness and no fullness. Left adnexum displays no mass, no tenderness and no fullness. Vaginal discharge found.   Genitourinary Comments: Uterus surgically absent. CERVIX IS PRESENT. No adnexal masses or tenderness. Vaginal mucosa with mild atrophy but otherwise normal.  Cuff intact   Musculoskeletal: Normal range of motion.   Neurological: She is alert and oriented to person, place, and time.   Skin: Skin is warm and dry.   Psychiatric: She has a normal mood and affect. Her behavior is normal. Judgment and thought content normal.         ASSESSMENT:       ICD-10-CM ICD-9-CM    1. Encounter for Papanicolaou smear for cervical cancer screening Z12.4 V76.2 Ambulatory referral/consult to Obstetrics / Gynecology      Liquid-Based Pap Smear, Screening      HPV High Risk Genotypes, PCR   2. Symptomatic menopausal or female climacteric states N95.1 627.2 Estradiol      Testosterone, free      Testosterone   3. Candidiasis B37.9 112.9 fluconazole (DIFLUCAN) 150 MG Tab          Plan:      Cary was seen today  for annual exam.    Diagnoses and all orders for this visit:    Encounter for Papanicolaou smear for cervical cancer screening  -     Ambulatory referral/consult to Obstetrics / Gynecology  -     Liquid-Based Pap Smear, Screening  -     HPV High Risk Genotypes, PCR  - Pt had supracervical hysterectomy.  Needs pap smear.  Will do pap and HPV today  - MMG ordered.  PCP to schedule  - Cscope ordered.    Symptomatic menopausal or female climacteric states  - Pt with complaints of weight gain and decreased libido.  Interested in estrogen and testosterone HRT  - Will get baseline labs.  Will refer to Dr. Borges for consultation of injections vs pellets.  Pt losing insurance at end of the month so will get in prior to end of the month for consult  -     Estradiol; Future  -     Testosterone, free; Future  -     Testosterone; Future    Candidiasis  -     fluconazole (DIFLUCAN) 150 MG Tab; Take 1 tablet (150 mg total) by mouth once daily. for 5 days        Orders Placed This Encounter   Procedures    HPV High Risk Genotypes, PCR    Estradiol    Testosterone, free    Testosterone       Follow up in about 1 year (around 6/1/2021) for annual.     Counseling time: 30 minutes    Lisa Bray

## 2020-06-02 ENCOUNTER — OFFICE VISIT (OUTPATIENT)
Dept: PRIMARY CARE CLINIC | Facility: CLINIC | Age: 60
End: 2020-06-02
Payer: COMMERCIAL

## 2020-06-02 VITALS
HEART RATE: 94 BPM | RESPIRATION RATE: 18 BRPM | HEIGHT: 63 IN | WEIGHT: 164.44 LBS | DIASTOLIC BLOOD PRESSURE: 81 MMHG | OXYGEN SATURATION: 99 % | BODY MASS INDEX: 29.14 KG/M2 | SYSTOLIC BLOOD PRESSURE: 160 MMHG

## 2020-06-02 DIAGNOSIS — I10 HYPERTENSION, UNSPECIFIED TYPE: Primary | ICD-10-CM

## 2020-06-02 DIAGNOSIS — E66.3 OVERWEIGHT (BMI 25.0-29.9): ICD-10-CM

## 2020-06-02 DIAGNOSIS — E78.5 HYPERLIPIDEMIA, UNSPECIFIED HYPERLIPIDEMIA TYPE: ICD-10-CM

## 2020-06-02 LAB
ESTRADIOL SERPL-MCNC: <10 PG/ML
TESTOST SERPL-MCNC: 20 NG/DL (ref 5–73)

## 2020-06-02 PROCEDURE — 3077F SYST BP >= 140 MM HG: CPT | Mod: CPTII,S$GLB,, | Performed by: INTERNAL MEDICINE

## 2020-06-02 PROCEDURE — 3077F PR MOST RECENT SYSTOLIC BLOOD PRESSURE >= 140 MM HG: ICD-10-PCS | Mod: CPTII,S$GLB,, | Performed by: INTERNAL MEDICINE

## 2020-06-02 PROCEDURE — 99999 PR PBB SHADOW E&M-EST. PATIENT-LVL III: CPT | Mod: PBBFAC,,, | Performed by: INTERNAL MEDICINE

## 2020-06-02 PROCEDURE — 99999 PR PBB SHADOW E&M-EST. PATIENT-LVL III: ICD-10-PCS | Mod: PBBFAC,,, | Performed by: INTERNAL MEDICINE

## 2020-06-02 PROCEDURE — 3079F DIAST BP 80-89 MM HG: CPT | Mod: CPTII,S$GLB,, | Performed by: INTERNAL MEDICINE

## 2020-06-02 PROCEDURE — 3008F PR BODY MASS INDEX (BMI) DOCUMENTED: ICD-10-PCS | Mod: CPTII,S$GLB,, | Performed by: INTERNAL MEDICINE

## 2020-06-02 PROCEDURE — 99214 PR OFFICE/OUTPT VISIT, EST, LEVL IV, 30-39 MIN: ICD-10-PCS | Mod: S$GLB,,, | Performed by: INTERNAL MEDICINE

## 2020-06-02 PROCEDURE — 3008F BODY MASS INDEX DOCD: CPT | Mod: CPTII,S$GLB,, | Performed by: INTERNAL MEDICINE

## 2020-06-02 PROCEDURE — 3079F PR MOST RECENT DIASTOLIC BLOOD PRESSURE 80-89 MM HG: ICD-10-PCS | Mod: CPTII,S$GLB,, | Performed by: INTERNAL MEDICINE

## 2020-06-02 PROCEDURE — 99214 OFFICE O/P EST MOD 30 MIN: CPT | Mod: S$GLB,,, | Performed by: INTERNAL MEDICINE

## 2020-06-02 RX ORDER — LISINOPRIL 10 MG/1
30 TABLET ORAL DAILY
Qty: 30 TABLET | Refills: 1 | Status: SHIPPED | OUTPATIENT
Start: 2020-06-02 | End: 2020-06-15 | Stop reason: SDUPTHER

## 2020-06-04 LAB — TESTOST FREE SERPL-MCNC: 0.7 PG/ML

## 2020-06-05 LAB
FINAL PATHOLOGIC DIAGNOSIS: NORMAL
HPV HR 12 DNA SPEC QL NAA+PROBE: NEGATIVE
HPV16 AG SPEC QL: NEGATIVE
HPV18 DNA SPEC QL NAA+PROBE: NEGATIVE
Lab: NORMAL

## 2020-06-14 DIAGNOSIS — I16.0 HYPERTENSIVE URGENCY: ICD-10-CM

## 2020-06-14 DIAGNOSIS — I10 HYPERTENSION, UNSPECIFIED TYPE: ICD-10-CM

## 2020-06-14 RX ORDER — LISINOPRIL 10 MG/1
30 TABLET ORAL DAILY
Qty: 30 TABLET | Refills: 1 | Status: CANCELLED | OUTPATIENT
Start: 2020-06-14

## 2020-06-15 ENCOUNTER — PATIENT MESSAGE (OUTPATIENT)
Dept: PRIMARY CARE CLINIC | Facility: CLINIC | Age: 60
End: 2020-06-15

## 2020-06-15 DIAGNOSIS — I10 HYPERTENSION, UNSPECIFIED TYPE: ICD-10-CM

## 2020-06-15 RX ORDER — LISINOPRIL 20 MG/1
30 TABLET ORAL DAILY
Qty: 30 TABLET | Refills: 0 | Status: SHIPPED | OUTPATIENT
Start: 2020-06-15 | End: 2020-06-16 | Stop reason: SDUPTHER

## 2020-06-15 RX ORDER — LISINOPRIL 10 MG/1
30 TABLET ORAL DAILY
Qty: 30 TABLET | Refills: 0 | Status: SHIPPED | OUTPATIENT
Start: 2020-06-15 | End: 2020-06-15 | Stop reason: SDUPTHER

## 2020-06-15 RX ORDER — AMLODIPINE BESYLATE 10 MG/1
10 TABLET ORAL DAILY
Qty: 30 TABLET | Refills: 0 | Status: SHIPPED | OUTPATIENT
Start: 2020-06-15 | End: 2020-07-02

## 2020-06-15 NOTE — TELEPHONE ENCOUNTER
I would like to inc her Lisinopril to 20 mg/d. I have resent a Rx for this at the new dose.  Make sure she picks up the correct dose.  Also, I would like to get a BMP on her before her visit. I think there is already an order in.  Let me know if not.    Dr. UGARTE

## 2020-06-16 ENCOUNTER — TELEPHONE (OUTPATIENT)
Dept: PRIMARY CARE CLINIC | Facility: CLINIC | Age: 60
End: 2020-06-16

## 2020-06-16 ENCOUNTER — HOSPITAL ENCOUNTER (OUTPATIENT)
Dept: RADIOLOGY | Facility: OTHER | Age: 60
Discharge: HOME | End: 2020-06-16
Attending: INTERNAL MEDICINE
Payer: COMMERCIAL

## 2020-06-16 ENCOUNTER — TELEPHONE (OUTPATIENT)
Dept: OBSTETRICS AND GYNECOLOGY | Facility: CLINIC | Age: 60
End: 2020-06-16

## 2020-06-16 ENCOUNTER — PATIENT OUTREACH (OUTPATIENT)
Dept: ADMINISTRATIVE | Facility: OTHER | Age: 60
End: 2020-06-16

## 2020-06-16 ENCOUNTER — OFFICE VISIT (OUTPATIENT)
Dept: OBSTETRICS AND GYNECOLOGY | Facility: CLINIC | Age: 60
End: 2020-06-16
Attending: OBSTETRICS & GYNECOLOGY
Payer: COMMERCIAL

## 2020-06-16 VITALS — HEIGHT: 64 IN | WEIGHT: 162.94 LBS | BODY MASS INDEX: 27.82 KG/M2

## 2020-06-16 VITALS
BODY MASS INDEX: 27.82 KG/M2 | HEIGHT: 64 IN | WEIGHT: 162.94 LBS | DIASTOLIC BLOOD PRESSURE: 92 MMHG | SYSTOLIC BLOOD PRESSURE: 188 MMHG

## 2020-06-16 DIAGNOSIS — I10 HYPERTENSION, UNSPECIFIED TYPE: ICD-10-CM

## 2020-06-16 DIAGNOSIS — Z12.31 SCREENING MAMMOGRAM, ENCOUNTER FOR: ICD-10-CM

## 2020-06-16 DIAGNOSIS — N95.1 MENOPAUSAL SYMPTOMS: Primary | ICD-10-CM

## 2020-06-16 DIAGNOSIS — Z78.0 POSTMENOPAUSAL: ICD-10-CM

## 2020-06-16 PROCEDURE — 77080 DXA BONE DENSITY AXIAL: CPT | Mod: TC

## 2020-06-16 PROCEDURE — 99999 PR PBB SHADOW E&M-EST. PATIENT-LVL III: ICD-10-PCS | Mod: PBBFAC,,, | Performed by: OBSTETRICS & GYNECOLOGY

## 2020-06-16 PROCEDURE — 77067 MAMMO DIGITAL SCREENING BILAT WITH TOMOSYNTHESIS_CAD: ICD-10-PCS | Mod: 26,,, | Performed by: RADIOLOGY

## 2020-06-16 PROCEDURE — 77063 BREAST TOMOSYNTHESIS BI: CPT | Mod: 26,,, | Performed by: RADIOLOGY

## 2020-06-16 PROCEDURE — 77067 SCR MAMMO BI INCL CAD: CPT | Mod: 26,,, | Performed by: RADIOLOGY

## 2020-06-16 PROCEDURE — 3008F BODY MASS INDEX DOCD: CPT | Mod: CPTII,S$GLB,, | Performed by: OBSTETRICS & GYNECOLOGY

## 2020-06-16 PROCEDURE — 77080 DXA BONE DENSITY AXIAL: CPT | Mod: 26,,, | Performed by: RADIOLOGY

## 2020-06-16 PROCEDURE — 77080 DEXA BONE DENSITY SPINE HIP: ICD-10-PCS | Mod: 26,,, | Performed by: RADIOLOGY

## 2020-06-16 PROCEDURE — 77067 SCR MAMMO BI INCL CAD: CPT | Mod: TC

## 2020-06-16 PROCEDURE — 77063 MAMMO DIGITAL SCREENING BILAT WITH TOMOSYNTHESIS_CAD: ICD-10-PCS | Mod: 26,,, | Performed by: RADIOLOGY

## 2020-06-16 PROCEDURE — 99214 OFFICE O/P EST MOD 30 MIN: CPT | Mod: S$GLB,,, | Performed by: OBSTETRICS & GYNECOLOGY

## 2020-06-16 PROCEDURE — 3080F DIAST BP >= 90 MM HG: CPT | Mod: CPTII,S$GLB,, | Performed by: OBSTETRICS & GYNECOLOGY

## 2020-06-16 PROCEDURE — 3008F PR BODY MASS INDEX (BMI) DOCUMENTED: ICD-10-PCS | Mod: CPTII,S$GLB,, | Performed by: OBSTETRICS & GYNECOLOGY

## 2020-06-16 PROCEDURE — 3077F PR MOST RECENT SYSTOLIC BLOOD PRESSURE >= 140 MM HG: ICD-10-PCS | Mod: CPTII,S$GLB,, | Performed by: OBSTETRICS & GYNECOLOGY

## 2020-06-16 PROCEDURE — 99999 PR PBB SHADOW E&M-EST. PATIENT-LVL III: CPT | Mod: PBBFAC,,, | Performed by: OBSTETRICS & GYNECOLOGY

## 2020-06-16 PROCEDURE — 99214 PR OFFICE/OUTPT VISIT, EST, LEVL IV, 30-39 MIN: ICD-10-PCS | Mod: S$GLB,,, | Performed by: OBSTETRICS & GYNECOLOGY

## 2020-06-16 PROCEDURE — 3077F SYST BP >= 140 MM HG: CPT | Mod: CPTII,S$GLB,, | Performed by: OBSTETRICS & GYNECOLOGY

## 2020-06-16 PROCEDURE — 3080F PR MOST RECENT DIASTOLIC BLOOD PRESSURE >= 90 MM HG: ICD-10-PCS | Mod: CPTII,S$GLB,, | Performed by: OBSTETRICS & GYNECOLOGY

## 2020-06-16 RX ORDER — LISINOPRIL 20 MG/1
TABLET ORAL
Qty: 30 TABLET | Refills: 1 | Status: SHIPPED | OUTPATIENT
Start: 2020-06-16 | End: 2020-06-30 | Stop reason: SDUPTHER

## 2020-06-16 RX ORDER — TESTOSTERONE CYPIONATE 200 MG/ML
50 INJECTION, SOLUTION INTRAMUSCULAR
Status: COMPLETED | OUTPATIENT
Start: 2020-06-16 | End: 2020-06-17

## 2020-06-16 NOTE — PROGRESS NOTES
I reviewed your Mammogram - Mass: Left breast mass at the posterior 6 o'clock position. Assessment: 0 - Incomplete. Diagnostic Mammogram and/or Ultrasound is recommended. Please put in orders for further imaging     Right  There is no mammographic evidence of malignancy in the right breast.     BI-RADS Category:   Overall: 0 - Incomplete: Needs Additional Imaging Evaluation    Recommendation:  Diagnostic mammogram with possible ultrasound (if indicated) is recommended.    Dr. UGARTE

## 2020-06-16 NOTE — PROGRESS NOTES
Care Everywhere: updated  Immunization: updated  Health Maintenance: updated  Media Review: n/a  Legacy Review: n/a  Order placed: n/a  Upcoming appts:mammogram 6.16.2020

## 2020-06-16 NOTE — TELEPHONE ENCOUNTER
----- Message from Adam Hill sent at 6/16/2020  2:07 PM CDT -----  Please call pt regarding her injection it was not schedule this is the 2nd pt calling regarding they injection

## 2020-06-16 NOTE — PROGRESS NOTES
Subjective:       Patient ID: Cary Claudio is a 60 y.o. female.    Chief Complaint:  Menopause (hormone consult)      History of Present Illness  HPI  This 59 y/o P2 presents today with request for HRT. She has had a Hysterectomy in 2014 and began to have menopausal symptoms about a year later. She has never been on hormone replacement. Her main complaints are gaining about 30# in the past 4 years, decreased energy, hair thinning, insomnia and decreased libido. She denies hot flashes and night sweats.      Estradiol: <10  Free Testos: 0.7  Total Testos: 20  TSH: Normal(2.376)  She has recently been diagnosed with HTN and placed on medication for this.   + Hyperlipidemia, not yet on medication,     Pertinent history is cardiac surgery for closure of a patent ductus arteriosus.    No prior cardiac history, family history of MI prior to age 50, or personal history of gestational DM/pre-eclampsia. She denies the following contraindications to HRT:  Vaginal bleeding, history of VTE/PE, thrombophilia,  breast cancer, or active liver disease.     GYN & OB History  She is up to date with her GYN exam, recently seen by Dr. Bray    Patient's last menstrual period was 2014 (approximate).   Date of Last Pap: No result found  Date of Last Mammogram: 2020: Left breast mass: needs further studies  Date of Last Dexa: 2020: Normal  OB History    Para Term  AB Living   2 2 2     2   SAB TAB Ectopic Multiple Live Births           2      # Outcome Date GA Lbr Peter/2nd Weight Sex Delivery Anes PTL Lv   2 Term     M Vag-Spont   GORGE   1 Term     F Vag-Spont   GORGE       Past Medical History:   Diagnosis Date    Anemia     Cardiac anomaly, congenital     pt had a repair for ?ASD '98 at [facility] with Dr. Aguilar    Congenital heart anomaly     Fibroids     uterine     History of migraine     Hyperlipidemia     Hypertension     Insomnia     Nicotine dependence in remission     PUD (peptic  "ulcer disease)        Past Surgical History:   Procedure Laterality Date    HYSTERECTOMY  2014    repair of congenital heart disease  1998    Repair of ASD       Review of Systems  Review of Systems        Objective:      BP (!) 188/92   Ht 5' 3.5" (1.613 m)   Wt 73.9 kg (162 lb 14.7 oz)   LMP 12/16/2014 (Approximate)   BMI 28.41 kg/m²   Physical Exam         Assessment:        1. Menopausal symptoms               Plan:      1. Menopausal symptoms    Long discussion with patient regarding different forms of HRT. We discussed r/b/a.There are many proven benefits to HRT including decreased risk for osteoporosis, colon cancer. Decreased risk for cardiovascular disease and dementia. We also discussed HRT and breast cancer. Breast cancer is very common, affecting 1/8 women regardless of HRT status. Any medicine is a R/B assessment .  There is still marked controversy over the actual role of HRT in breast cancer risk.  We did discuss that transdermal option of estrogen is safer than oral estradiol as transdermal methods decrease risk for blood clots and stroke as they bypass liver metabolism.  Further, there is recent data to suggest that discontinuing HT is harmful, and that there is an increased risk of cardiac and stroke mortality within the first year of discontinuing HT, especially in women who initiated HT at less than 60 years of age.(This is thought to be related to decreased production of nitric oxide, resulting in vasoconstriction, exposure to activated inflammatory processes that have implications for acute events resulting from rupture of underlying susceptible atherosclerotic placque, and direct myocardial effects that may predispose to fatal arrythmias through calcium channel dysregulation.) Other organ systems also show rapid deterioration upon withdrawal of HT, as shown in the skeletal system where withdrawal of HT results in rapid bone loss of bone mineral density, and an increased risk of bone " fracture, including hip fractures that are associated with significant mortality.       The patient and I have discussed testosterone therapy and its risks and benefits.  The risks include increased increasing cholesterol levels, facial hair and other hair growth, acne, increased sized of clitoris, deepening of voice, anxiety as well as other side effects.  Benefits include increased energy level, increased libido, easier orgasm and potential increased muscle mass.  Pt understands that different women have different amounts of risks and benefits to this med and that she can stop the medication at any time.     - testosterone cypionate injection 50 mg  Further evaluation of breast mass  - Ambulatory referral/consult to Cardiology; Future(will begin estradiol based on recommendations of Cardiology risk assessment)     Follow up if symptoms worsen or fail to improve.

## 2020-06-16 NOTE — TELEPHONE ENCOUNTER
Pt had abn MGM.  Please see if she can get her further imaging ASAP.  She runs out of insurance at end of the month.     Dr. UGARTE

## 2020-06-17 ENCOUNTER — LAB VISIT (OUTPATIENT)
Dept: LAB | Facility: HOSPITAL | Age: 60
End: 2020-06-17
Attending: INTERNAL MEDICINE
Payer: COMMERCIAL

## 2020-06-17 ENCOUNTER — CLINICAL SUPPORT (OUTPATIENT)
Dept: OBSTETRICS AND GYNECOLOGY | Facility: CLINIC | Age: 60
End: 2020-06-17
Payer: COMMERCIAL

## 2020-06-17 DIAGNOSIS — I10 HYPERTENSION, UNSPECIFIED TYPE: ICD-10-CM

## 2020-06-17 DIAGNOSIS — N95.1 MENOPAUSAL SYMPTOMS: Primary | ICD-10-CM

## 2020-06-17 PROCEDURE — 36415 COLL VENOUS BLD VENIPUNCTURE: CPT | Mod: PN

## 2020-06-17 PROCEDURE — 96372 THER/PROPH/DIAG INJ SC/IM: CPT | Mod: S$GLB,,, | Performed by: OBSTETRICS & GYNECOLOGY

## 2020-06-17 PROCEDURE — 96372 PR INJECTION,THERAP/PROPH/DIAG2ST, IM OR SUBCUT: ICD-10-PCS | Mod: S$GLB,,, | Performed by: OBSTETRICS & GYNECOLOGY

## 2020-06-17 PROCEDURE — 80048 BASIC METABOLIC PNL TOTAL CA: CPT

## 2020-06-17 RX ADMIN — TESTOSTERONE CYPIONATE 50 MG: 200 INJECTION, SOLUTION INTRAMUSCULAR at 02:06

## 2020-06-17 NOTE — TELEPHONE ENCOUNTER
[facility] typically coordinates repeat imaging on their end. I see they sent pt a letter with a specific number to call to set this up. Pt was here for her labs and I gave her a copy with that number. She will contact them to set up.

## 2020-06-17 NOTE — TELEPHONE ENCOUNTER
----- Message from Beatriz Barrera sent at 6/17/2020 10:22 AM CDT -----  Regarding: Pt self Mobile/Home [phone]  Patient is returning a phone call.  Who left a message for the patient:   Does patient know what this is regarding:    Comments: Patient said that she received a call on today and she would like a call back.

## 2020-06-17 NOTE — PROGRESS NOTES
Patient arrives today for her first monthly testosterone injection. Testosterone explained in detail including metabolism, administration, risks, benefits, side effects and reasons warranting MD notification. All questions and concerns addressed to patient's satisfaction.      Testosterone 50mg administered IM to LEFT upper outer quadrant of gluteus using aseptic technique and 22 gauge 1.5 inch needle.     Site secured with Band-Aid, needle tip remains intact, patient tolerated well without pain. Patient observed for 15 minutes post injection.      Patient's next injection scheduled prior to clinic departure. UBALDO Dee

## 2020-06-18 ENCOUNTER — TELEPHONE (OUTPATIENT)
Dept: PRIMARY CARE CLINIC | Facility: CLINIC | Age: 60
End: 2020-06-18

## 2020-06-18 LAB
ANION GAP SERPL CALC-SCNC: 14 MMOL/L (ref 8–16)
BUN SERPL-MCNC: 25 MG/DL (ref 8–23)
CALCIUM SERPL-MCNC: 9.8 MG/DL (ref 8.7–10.5)
CHLORIDE SERPL-SCNC: 105 MMOL/L (ref 95–110)
CO2 SERPL-SCNC: 23 MMOL/L (ref 23–29)
CREAT SERPL-MCNC: 1.2 MG/DL (ref 0.5–1.4)
EST. GFR  (AFRICAN AMERICAN): 56 ML/MIN/1.73 M^2
EST. GFR  (NON AFRICAN AMERICAN): 48.6 ML/MIN/1.73 M^2
GLUCOSE SERPL-MCNC: 102 MG/DL (ref 70–110)
POTASSIUM SERPL-SCNC: 3.8 MMOL/L (ref 3.5–5.1)
SODIUM SERPL-SCNC: 142 MMOL/L (ref 136–145)

## 2020-06-18 NOTE — PROGRESS NOTES
Please inform pt - I reviewed her labs.  Her kidney funciton has dec slightly.  BUN/Cr - 25/1.2.  I rec she inc her hydration. Will continue to monitor.     Dr. UGARTE

## 2020-06-18 NOTE — TELEPHONE ENCOUNTER
----- Message from Nicole Rich MD sent at 6/18/2020  8:01 AM CDT -----  Please inform pt - I reviewed her labs.  Her kidney funciton has dec slightly.  BUN/Cr - 25/1.2.  I rec she inc her hydration. Will continue to monitor.     Dr. UGARTE

## 2020-06-19 ENCOUNTER — HOSPITAL ENCOUNTER (OUTPATIENT)
Dept: RADIOLOGY | Facility: HOSPITAL | Age: 60
Discharge: HOME | End: 2020-06-19
Attending: INTERNAL MEDICINE
Payer: COMMERCIAL

## 2020-06-19 DIAGNOSIS — R92.8 ABNORMAL MAMMOGRAM: ICD-10-CM

## 2020-06-19 PROCEDURE — 76642 ULTRASOUND BREAST LIMITED: CPT | Mod: 26,LT,, | Performed by: RADIOLOGY

## 2020-06-19 PROCEDURE — 76642 US BREAST LEFT LIMITED: ICD-10-PCS | Mod: 26,LT,, | Performed by: RADIOLOGY

## 2020-06-19 PROCEDURE — 77065 DX MAMMO INCL CAD UNI: CPT | Mod: TC,PO,LT

## 2020-06-19 PROCEDURE — 77061 BREAST TOMOSYNTHESIS UNI: CPT | Mod: TC,PO,LT

## 2020-06-19 PROCEDURE — 77065 MAMMO DIGITAL DIAGNOSTIC LEFT WITH TOMOSYNTHESIS_CAD: ICD-10-PCS | Mod: 26,LT,, | Performed by: RADIOLOGY

## 2020-06-19 PROCEDURE — 76642 ULTRASOUND BREAST LIMITED: CPT | Mod: TC,PO,LT

## 2020-06-19 PROCEDURE — 77061 BREAST TOMOSYNTHESIS UNI: CPT | Mod: 26,LT,, | Performed by: RADIOLOGY

## 2020-06-19 PROCEDURE — 77061 MAMMO DIGITAL DIAGNOSTIC LEFT WITH TOMOSYNTHESIS_CAD: ICD-10-PCS | Mod: 26,LT,, | Performed by: RADIOLOGY

## 2020-06-19 PROCEDURE — 77065 DX MAMMO INCL CAD UNI: CPT | Mod: 26,LT,, | Performed by: RADIOLOGY

## 2020-06-19 NOTE — PROGRESS NOTES
I sent pt a my chart message -  I reviewed your Mammogram -   The left breast is heterogeneously dense, which may obscure small masses.     There is a low density, round mass with obscured margins seen in the lower central region of the left breast in the posterior depth. The mass correlates with the prior mammogram finding.      US Breast Left Limited  There is a benign 6 mm x 4 mm x 4 mm round simple cyst with circumscribed margins seen in the left breast at 6 o'clock, 1 cm from the nipple. The cyst correlates with the mass seen on mammogram.     Impression:  There is no mammographic or sonographic evidence of malignancy in the left breast.    BI-RADS Category:   Overall: 2 - Benign  Routine screening mammogram in 1 year is recommended.  Dr. UGARTE

## 2020-06-29 NOTE — PROGRESS NOTES
[facility] Note    Chief Complaint      Chief Complaint   Patient presents with    Follow-up    Otalgia     right       History of Present Illness      Cary Claudio is a 60 y.o. HTN, Anemia, Duodenal ulcer presents to fu HTN. Last visit -6/2/20     HLD -  TG 67 HDL 78 .  Rec low chol diet. Repeat in 4 mos. Consider statin if still elevated.      HTN - Pt was prev on Norvasc 10 mg/d, HCTZ 25 mg, lisinopril 30 mg/d. She ran out 5 yrs ago when she lost her insurance. Pt currently on Norvasc 10 mg/d and Lisinopril 20 mg/d. I inc this after last visit.  Monitor for any swelling.  Rec low sodium diet.  BUN/Cr - 21/1.1. She reports some edea.  Will add HCTZ 12.5 mg/d. Echo -5/19/20 -  Concentric left ventricular remodeling, EF - 65%. Grade I (mild) left ventricular diastolic dysfunction consistent with impaired relaxation. Mild TR. Mildly reduced right ventricular systolic function. Mild Ventricular Enlargement. Note - potential ASD repair - inter-atrial septum does appear thickened and hyperechoic which could represent repair. No flow by color doppler. No evidence of significant RV dilation or dysfunction. Pt has appt with Dr. Yo, [facility], to est care.     Duodenal ulcer - Pt prev on Omeprazole. Rec Avoid NSAIDS.   H/o Anemia - H/H - 14/46. Resolved This was when she had bleeding ulcers in past?     Overweight  BMI - 28.4 - Pt walks her dog most days and does some weight 4-5 times/wk. She plays tennis intermittently. She gained 30 lb in 4 yrs.  She recently started Testoterone.     HCM - Flu - none;  Tdap - 5/19/20;  PCV 13 - none;  PVX 23 - none;  Shingrix - none; Zostavax - 10/8/14;  MGM -6/19/20;   Left breast U/S 6/19/20 - benign simple cyst - repeat MGM in 1 yr  DEXA - 6/16/20 - Osteopenia;  PAP - 6/1/20 - neg; pt s/p partial Hys; Hep C Screen - neg;  HIV Screen - neg; C-scope - 11/21/14 - repeat 10 yrs; Prev PCP - Dr. Rutledge; [facility] - [facility]; Ob/GYN -   K[facility]ina    Past Medical History:  Past Medical History:   Diagnosis Date    Anemia     Cardiac anomaly, congenital     pt had a repair for ?ASD '98 at [facility] with Dr. Aguilar    Congenital heart anomaly     Fibroids     uterine     History of migraine     Hyperlipidemia     Hypertension     Insomnia     Nicotine dependence in remission     PUD (peptic ulcer disease)        Past Surgical History:   has a past surgical history that includes repair of congenital heart disease (1998) and Hysterectomy (2014).    Family History:  family history includes Diabetes in her brother; Heart attacks under age 50 (age of onset: 42) in her maternal grandfather; Heart disease in her father and mother; Hypertension in her mother; Rheum arthritis in her sister.     Social History:  Social History     Tobacco Use    Smoking status: Former Smoker     Types: Cigarettes     Quit date: 7/8/2004     Years since quitting: 15.9    Smokeless tobacco: Never Used    Tobacco comment: a few cig on occasion x 2 yrs   Substance Use Topics    Alcohol use: Yes     Alcohol/week: 5.0 standard drinks     Types: 5 Glasses of wine per week     Frequency: 4 or more times a week     Drinks per session: 1 or 2     Binge frequency: Never    Drug use: No       Review of Systems   Constitutional: Positive for malaise/fatigue.   HENT: Positive for ear pain and sore throat. Negative for tinnitus.         Mild Sore thraot and RT otalgia. Rec claritin prn.    Eyes: Negative for blurred vision.   Respiratory: Negative for cough and shortness of breath.    Cardiovascular: Negative for chest pain, palpitations, orthopnea and PND.   Gastrointestinal: Negative for abdominal pain, diarrhea, nausea and vomiting.   Musculoskeletal: Negative for neck pain.   Neurological: Negative for headaches.        Medications:  Outpatient Encounter Medications as of 6/30/2020   Medication Sig Dispense Refill    amLODIPine (NORVASC) 10 MG tablet Take 1 tablet (10 mg  "total) by mouth once daily. 30 tablet 0    lisinopriL (PRINIVIL,ZESTRIL) 20 MG tablet 1 tab daily 90 tablet 1    [DISCONTINUED] lisinopriL (PRINIVIL,ZESTRIL) 20 MG tablet 1 tab daily 30 tablet 1    hydroCHLOROthiazide (HYDRODIURIL) 12.5 MG Tab Take 1 tablet (12.5 mg total) by mouth once daily. 90 tablet 1    [DISCONTINUED] hydroCHLOROthiazide (HYDRODIURIL) 12.5 MG Tab Take 1 tablet (12.5 mg total) by mouth once daily. 30 tablet 1     No facility-administered encounter medications on file as of 6/30/2020.        Current Outpatient Medications:     amLODIPine (NORVASC) 10 MG tablet, Take 1 tablet (10 mg total) by mouth once daily., Disp: 30 tablet, Rfl: 0    lisinopriL (PRINIVIL,ZESTRIL) 20 MG tablet, 1 tab daily, Disp: 90 tablet, Rfl: 1    hydroCHLOROthiazide (HYDRODIURIL) 12.5 MG Tab, Take 1 tablet (12.5 mg total) by mouth once daily., Disp: 90 tablet, Rfl: 1    Allergies:  Review of patient's allergies indicates:   Allergen Reactions    Compazine [prochlorperazine] Swelling     Tongue swelling    Pcn [penicillins] Swelling     Tongue swelling       Health Maintenance:  Immunization History   Administered Date(s) Administered    Influenza - Quadrivalent 10/08/2014    Tdap 05/19/2020    Zoster 10/08/2014      Health Maintenance   Topic Date Due    Lipid Panel  05/19/2021    DEXA SCAN  06/16/2022    Mammogram  06/19/2022    TETANUS VACCINE  05/19/2030    Hepatitis C Screening  Completed      Objective:      Vital Signs  Temp: 99.5 °F (37.5 °C)  Pulse: 72  Resp: 19  SpO2: 99 %  BP: (!) 142/74  BP Location: Right arm  Patient Position: Sitting  Pain Score: 0-No pain  Height and Weight  Height: 5' 3.5" (161.3 cm)  Weight: 74.1 kg (163 lb 5.8 oz)  BSA (Calculated - sq m): 1.82 sq meters  BMI (Calculated): 28.5  Weight in (lb) to have BMI = 25: 143.1]    Laboratory:  CBC:  Recent Labs   Lab 05/19/20  1125   WBC 8.38   RBC 4.95   Hemoglobin 14.2   Hematocrit 46.1   Platelets 220   Mean Corpuscular Volume 93 "   Mean Corpuscular Hemoglobin 28.7   Mean Corpuscular Hemoglobin Conc 30.8 L       CMP:  Recent Labs   Lab 05/19/20  1125 06/17/20  1031   Glucose 98 102   Calcium 9.7 9.8   Albumin 4.5  --    Total Protein 7.9  --    Sodium 143 142   Potassium 3.9 3.8   CO2 26 23   Chloride 106 105   BUN, Bld 21 25 H   Creatinine 1.1 1.2   eGFR if African American >60.0 56.0 A   eGFR if non  53.9 A 48.6 A   Alkaline Phosphatase 91  --    ALT 14  --    AST 22  --    Total Bilirubin 0.3  --        URINALYSIS:              LIPIDS:  Recent Labs   Lab 05/19/20  1125   TSH 2.376   HDL 78 H   Cholesterol 253 H   Triglycerides 67   LDL Cholesterol 161.6 H   Hdl/Cholesterol Ratio 30.8   Non-HDL Cholesterol 175   Total Cholesterol/HDL Ratio 3.2       TSH:  Recent Labs   Lab 05/19/20  1125   TSH 2.376       Other:   Lab Results   Component Value Date    TOTALTESTOST 20 06/01/2020    ESTRADIOL <10 (A) 06/01/2020    FERRITIN 35 12/29/2014     Lab Results   Component Value Date    HEPCAB Negative 05/19/2020       Physical Exam  Vitals signs reviewed.   Constitutional:       Appearance: Normal appearance. She is not ill-appearing, toxic-appearing or diaphoretic.   HENT:      Head: Normocephalic and atraumatic.      Right Ear: Tympanic membrane normal.      Left Ear: Tympanic membrane normal.      Nose: No congestion.   Eyes:      Extraocular Movements: Extraocular movements intact.      Conjunctiva/sclera: Conjunctivae normal.      Pupils: Pupils are equal, round, and reactive to light.   Neck:      Musculoskeletal: Neck supple.   Cardiovascular:      Rate and Rhythm: Normal rate and regular rhythm.      Pulses: Normal pulses.      Heart sounds: Normal heart sounds.   Pulmonary:      Effort: Pulmonary effort is normal.      Breath sounds: Normal breath sounds.   Abdominal:      General: Abdomen is flat. Bowel sounds are normal.      Palpations: Abdomen is soft.   Musculoskeletal: Normal range of motion.   Skin:     General: Skin  is warm and dry.   Neurological:      General: No focal deficit present.      Mental Status: She is alert and oriented to person, place, and time.   Psychiatric:         Mood and Affect: Mood normal.         Behavior: Behavior normal.             Assessment:       1. Hypertension, unspecified type    2. Hyperlipidemia, unspecified hyperlipidemia type    3. Sore throat    4. Exposure to Covid-19 Virus        Cary Claudio is a 60 y.o.female with:    1. Hypertension, unspecified type  - hydroCHLOROthiazide (HYDRODIURIL) 12.5 MG Tab; Take 1 tablet (12.5 mg total) by mouth once daily.  Dispense: 90 tablet; Refill: 1  - lisinopriL (PRINIVIL,ZESTRIL) 20 MG tablet; 1 tab daily  Dispense: 90 tablet; Refill: 1  -Uncontrolled.  Will add HCTZ 12.5 mg/d to regimen.  RTC in 2 wks for nurse BP check    2. Hyperlipidemia, unspecified hyperlipidemia type  -Cont to monitor.  Repeat FLP at next visit.     3. Sore throat  - COVID-19 Routine Screening  -Swabbed for COVID -19 given exposure, High risk, and sore throat.     4. Exposure to Covid-19 Virus  -Swabbed for COVID -19 given exposure, High risk, and sore throat.        Chronic conditions status updated as per HPI.  Other than changes above, cont current medications and maintain follow up with specialists.  Return to clinic in 6 mos or soomer if needed. RTC in 2 wks for nurse BP check    Nicole Rich MD  [facility]          Answers for HPI/ROS submitted by the patient on 6/29/2020   Hypertension  Chronicity: chronic  Onset: more than 1 year ago  Progression since onset: gradually improving  Condition status: uncontrolled  anxiety: Yes  peripheral edema: Yes  sweats: No  CAD risks: family history, obesity, post-menopausal state  Compliance problems: no compliance problems  Past treatments: diuretics, lifestyle changes  Improvement on treatment: moderate

## 2020-06-30 ENCOUNTER — OFFICE VISIT (OUTPATIENT)
Dept: PRIMARY CARE CLINIC | Facility: CLINIC | Age: 60
End: 2020-06-30
Payer: COMMERCIAL

## 2020-06-30 VITALS
TEMPERATURE: 100 F | RESPIRATION RATE: 19 BRPM | WEIGHT: 163.38 LBS | OXYGEN SATURATION: 99 % | HEIGHT: 64 IN | SYSTOLIC BLOOD PRESSURE: 142 MMHG | HEART RATE: 72 BPM | BODY MASS INDEX: 27.89 KG/M2 | DIASTOLIC BLOOD PRESSURE: 74 MMHG

## 2020-06-30 DIAGNOSIS — Z20.822 EXPOSURE TO COVID-19 VIRUS: ICD-10-CM

## 2020-06-30 DIAGNOSIS — E78.5 HYPERLIPIDEMIA, UNSPECIFIED HYPERLIPIDEMIA TYPE: ICD-10-CM

## 2020-06-30 DIAGNOSIS — J02.9 SORE THROAT: ICD-10-CM

## 2020-06-30 DIAGNOSIS — I10 HYPERTENSION, UNSPECIFIED TYPE: Primary | ICD-10-CM

## 2020-06-30 PROCEDURE — 3078F PR MOST RECENT DIASTOLIC BLOOD PRESSURE < 80 MM HG: ICD-10-PCS | Mod: CPTII,S$GLB,, | Performed by: INTERNAL MEDICINE

## 2020-06-30 PROCEDURE — 99214 PR OFFICE/OUTPT VISIT, EST, LEVL IV, 30-39 MIN: ICD-10-PCS | Mod: S$GLB,,, | Performed by: INTERNAL MEDICINE

## 2020-06-30 PROCEDURE — U0003 INFECTIOUS AGENT DETECTION BY NUCLEIC ACID (DNA OR RNA); SEVERE ACUTE RESPIRATORY SYNDROME CORONAVIRUS 2 (SARS-COV-2) (CORONAVIRUS DISEASE [COVID-19]), AMPLIFIED PROBE TECHNIQUE, MAKING USE OF HIGH THROUGHPUT TECHNOLOGIES AS DESCRIBED BY CMS-2020-01-R: HCPCS

## 2020-06-30 PROCEDURE — 3077F PR MOST RECENT SYSTOLIC BLOOD PRESSURE >= 140 MM HG: ICD-10-PCS | Mod: CPTII,S$GLB,, | Performed by: INTERNAL MEDICINE

## 2020-06-30 PROCEDURE — 3008F BODY MASS INDEX DOCD: CPT | Mod: CPTII,S$GLB,, | Performed by: INTERNAL MEDICINE

## 2020-06-30 PROCEDURE — 3008F PR BODY MASS INDEX (BMI) DOCUMENTED: ICD-10-PCS | Mod: CPTII,S$GLB,, | Performed by: INTERNAL MEDICINE

## 2020-06-30 PROCEDURE — 99999 PR PBB SHADOW E&M-EST. PATIENT-LVL III: ICD-10-PCS | Mod: PBBFAC,,, | Performed by: INTERNAL MEDICINE

## 2020-06-30 PROCEDURE — 99999 PR PBB SHADOW E&M-EST. PATIENT-LVL III: CPT | Mod: PBBFAC,,, | Performed by: INTERNAL MEDICINE

## 2020-06-30 PROCEDURE — 3078F DIAST BP <80 MM HG: CPT | Mod: CPTII,S$GLB,, | Performed by: INTERNAL MEDICINE

## 2020-06-30 PROCEDURE — 99214 OFFICE O/P EST MOD 30 MIN: CPT | Mod: S$GLB,,, | Performed by: INTERNAL MEDICINE

## 2020-06-30 PROCEDURE — 3077F SYST BP >= 140 MM HG: CPT | Mod: CPTII,S$GLB,, | Performed by: INTERNAL MEDICINE

## 2020-06-30 RX ORDER — LISINOPRIL 20 MG/1
TABLET ORAL
Qty: 90 TABLET | Refills: 1 | Status: SHIPPED | OUTPATIENT
Start: 2020-06-30 | End: 2021-06-28 | Stop reason: SDUPTHER

## 2020-06-30 RX ORDER — HYDROCHLOROTHIAZIDE 12.5 MG/1
12.5 TABLET ORAL DAILY
Qty: 30 TABLET | Refills: 1 | Status: SHIPPED | OUTPATIENT
Start: 2020-06-30 | End: 2020-06-30

## 2020-06-30 RX ORDER — HYDROCHLOROTHIAZIDE 12.5 MG/1
12.5 TABLET ORAL DAILY
Qty: 90 TABLET | Refills: 1 | Status: SHIPPED | OUTPATIENT
Start: 2020-06-30 | End: 2021-01-31

## 2020-07-05 LAB — SARS-COV-2 RNA RESP QL NAA+PROBE: NEGATIVE

## 2020-07-06 ENCOUNTER — TELEPHONE (OUTPATIENT)
Dept: PRIMARY CARE CLINIC | Facility: CLINIC | Age: 60
End: 2020-07-06

## 2020-07-06 NOTE — TELEPHONE ENCOUNTER
----- Message from Nicole Rich MD sent at 7/6/2020  1:28 PM CDT -----  Please inform pt her COVID -19 swab was negative.  See how she is feeling.    Dr. UGARTE

## 2020-07-15 ENCOUNTER — CLINICAL SUPPORT (OUTPATIENT)
Dept: OBSTETRICS AND GYNECOLOGY | Facility: CLINIC | Age: 60
End: 2020-07-15
Payer: COMMERCIAL

## 2020-07-15 DIAGNOSIS — N95.1 MENOPAUSAL SYMPTOMS: Primary | ICD-10-CM

## 2020-07-15 PROCEDURE — 96372 THER/PROPH/DIAG INJ SC/IM: CPT | Mod: S$GLB,,, | Performed by: OBSTETRICS & GYNECOLOGY

## 2020-07-15 PROCEDURE — 96372 PR INJECTION,THERAP/PROPH/DIAG2ST, IM OR SUBCUT: ICD-10-PCS | Mod: S$GLB,,, | Performed by: OBSTETRICS & GYNECOLOGY

## 2020-07-15 RX ORDER — TESTOSTERONE CYPIONATE 200 MG/ML
50 INJECTION, SOLUTION INTRAMUSCULAR
Status: COMPLETED | OUTPATIENT
Start: 2020-07-15 | End: 2020-07-15

## 2020-07-15 RX ADMIN — TESTOSTERONE CYPIONATE 50 MG: 200 INJECTION, SOLUTION INTRAMUSCULAR at 10:07

## 2020-07-15 NOTE — PROGRESS NOTES
Patient arrives for her second dose of Testosterone. Patient reports mild nausea after first injection which subsided after one day.     Testosterone 50mg administered IM to RIGHT upper outer quadrant of gluteus using aseptic technique and 22 gauge 1.5 inch needle.     Site secured with Band-Aid, needle tip remains intact, patient tolerated well without pain. Patient observed for 15 minutes post injection.      Patient's next injection scheduled prior to clinic departure. UBALDO Dee

## 2020-07-24 ENCOUNTER — TELEPHONE (OUTPATIENT)
Dept: OBSTETRICS AND GYNECOLOGY | Facility: CLINIC | Age: 60
End: 2020-07-24

## 2020-07-24 NOTE — TELEPHONE ENCOUNTER
----- Message from Elvi Addison RN sent at 7/15/2020 10:49 AM CDT -----  Mild atrophy noted on Dr. Bray's WWE, patient c/o vaginal dryness (no pelvic pain or pain with penetration). I discussed Lubrigyn moisturizer and sylk lubricant, please advise if you have additional recommendations for intravaginal tx as pt is very uncomforable during intimacy.     Kindest Regards,   Elvi Addison RN, BSN  RN Navigator   [facility]  [phone]

## 2020-07-24 NOTE — TELEPHONE ENCOUNTER
Called patient to discuss concerns, , got only voice mail. Left message on voice mail for her to call back or send me a portal message.

## 2020-08-12 ENCOUNTER — CLINICAL SUPPORT (OUTPATIENT)
Dept: OBSTETRICS AND GYNECOLOGY | Facility: CLINIC | Age: 60
End: 2020-08-12
Payer: COMMERCIAL

## 2020-08-12 DIAGNOSIS — N95.1 MENOPAUSAL SYMPTOMS: Primary | ICD-10-CM

## 2020-08-12 PROCEDURE — 96372 PR INJECTION,THERAP/PROPH/DIAG2ST, IM OR SUBCUT: ICD-10-PCS | Mod: S$GLB,,, | Performed by: OBSTETRICS & GYNECOLOGY

## 2020-08-12 PROCEDURE — 96372 THER/PROPH/DIAG INJ SC/IM: CPT | Mod: S$GLB,,, | Performed by: OBSTETRICS & GYNECOLOGY

## 2020-08-12 RX ORDER — TESTOSTERONE CYPIONATE 200 MG/ML
50 INJECTION, SOLUTION INTRAMUSCULAR
COMMUNITY
End: 2021-06-28

## 2020-08-12 RX ORDER — TESTOSTERONE CYPIONATE 200 MG/ML
50 INJECTION, SOLUTION INTRAMUSCULAR
Status: COMPLETED | OUTPATIENT
Start: 2020-08-12 | End: 2020-08-12

## 2020-08-12 RX ADMIN — TESTOSTERONE CYPIONATE 50 MG: 200 INJECTION, SOLUTION INTRAMUSCULAR at 11:08

## 2020-08-12 NOTE — PROGRESS NOTES
Patient arrives for her THIRD dose of Testosterone. Nausea has subsided. Patient does not feel much difference after her second Testosterone injection.       Testosterone 50mg administered IM to LEFT upper outer quadrant of gluteus using aseptic technique and 22 gauge 1.5 inch needle.     Site secured with Band-Aid, needle tip remains intact, patient tolerated well without pain. Patient observed for 15 minutes post injection.      Patient's labs scheduled 09/02/20, next injection scheduled 9/9/20 prior to clinic departure. UBALDO Dee

## 2020-08-18 ENCOUNTER — OFFICE VISIT (OUTPATIENT)
Dept: OBSTETRICS AND GYNECOLOGY | Facility: CLINIC | Age: 60
End: 2020-08-18
Payer: COMMERCIAL

## 2020-08-18 VITALS
DIASTOLIC BLOOD PRESSURE: 102 MMHG | SYSTOLIC BLOOD PRESSURE: 152 MMHG | WEIGHT: 161.19 LBS | HEIGHT: 63 IN | BODY MASS INDEX: 28.56 KG/M2

## 2020-08-18 DIAGNOSIS — E88.810 METABOLIC SYNDROME: Primary | ICD-10-CM

## 2020-08-18 DIAGNOSIS — E66.3 OVERWEIGHT (BMI 25.0-29.9): ICD-10-CM

## 2020-08-18 PROCEDURE — 3008F BODY MASS INDEX DOCD: CPT | Mod: CPTII,S$GLB,, | Performed by: PHYSICIAN ASSISTANT

## 2020-08-18 PROCEDURE — 3080F PR MOST RECENT DIASTOLIC BLOOD PRESSURE >= 90 MM HG: ICD-10-PCS | Mod: CPTII,S$GLB,, | Performed by: PHYSICIAN ASSISTANT

## 2020-08-18 PROCEDURE — 3080F DIAST BP >= 90 MM HG: CPT | Mod: CPTII,S$GLB,, | Performed by: PHYSICIAN ASSISTANT

## 2020-08-18 PROCEDURE — 3077F PR MOST RECENT SYSTOLIC BLOOD PRESSURE >= 140 MM HG: ICD-10-PCS | Mod: CPTII,S$GLB,, | Performed by: PHYSICIAN ASSISTANT

## 2020-08-18 PROCEDURE — 99213 PR OFFICE/OUTPT VISIT, EST, LEVL III, 20-29 MIN: ICD-10-PCS | Mod: S$GLB,,, | Performed by: PHYSICIAN ASSISTANT

## 2020-08-18 PROCEDURE — 3077F SYST BP >= 140 MM HG: CPT | Mod: CPTII,S$GLB,, | Performed by: PHYSICIAN ASSISTANT

## 2020-08-18 PROCEDURE — 3008F PR BODY MASS INDEX (BMI) DOCUMENTED: ICD-10-PCS | Mod: CPTII,S$GLB,, | Performed by: PHYSICIAN ASSISTANT

## 2020-08-18 PROCEDURE — 99213 OFFICE O/P EST LOW 20 MIN: CPT | Mod: S$GLB,,, | Performed by: PHYSICIAN ASSISTANT

## 2020-08-18 RX ORDER — METFORMIN HYDROCHLORIDE 500 MG/1
TABLET ORAL
Qty: 90 TABLET | Refills: 3 | Status: SHIPPED | OUTPATIENT
Start: 2020-08-18 | End: 2021-03-15 | Stop reason: SDUPTHER

## 2020-08-18 NOTE — PROGRESS NOTES
Subjective:      Cary Claudio is a 60 y.o. female who presents to discuss weight loss. Had total hysterectomy in 2014. About two years later started to gain weight. Has increased physical exercise with walking, light strength training and tennis. Watches what she eats, but has not been able to lose the 30 pounds she has gained. Becoming frustrated. Sees Dr. Borges for HRT and just started on testosterone IM monthly. Has improved energy levels. Feels like she knows what to eat, but not losing weight.    Sleep: Waking in the night, but getting around 8 hours a night.      Stress: Increased. Stopped working in May 2020 due to difficult boss. Does not currently have insurance.    Exercise: Walking and strength training at home 3-4x per week. Restarting tennis next month 3-4x per week.     A typical day consists of:  Breakfast: Coffee with almond creamer; usually skips or will have egg with Sampson toast  Lunch: Asparagus salad with grilled chicken and creamy italian dressing- picks up or makes  Dinner: Chicken, steaks, or porkchop with vegetable and sweet potato  Snack: chocolate or low calorie ice cream after dinner.  Beverages: Water; 1-2 glasses of kenneth wine every night.      Office Visit on 06/30/2020   Component Date Value Ref Range Status    SARS-CoV-2 RNA, Amplification, Qual 07/01/2020 Negative   Final   Lab Visit on 06/17/2020   Component Date Value Ref Range Status    Sodium 06/17/2020 142  136 - 145 mmol/L Final    Potassium 06/17/2020 3.8  3.5 - 5.1 mmol/L Final    Chloride 06/17/2020 105  95 - 110 mmol/L Final    CO2 06/17/2020 23  23 - 29 mmol/L Final    Glucose 06/17/2020 102  70 - 110 mg/dL Final    BUN, Bld 06/17/2020 25* 8 - 23 mg/dL Final    Creatinine 06/17/2020 1.2  0.5 - 1.4 mg/dL Final    Calcium 06/17/2020 9.8  8.7 - 10.5 mg/dL Final    Anion Gap 06/17/2020 14  8 - 16 mmol/L Final    eGFR if  06/17/2020 56.0* >60 mL/min/1.73 m^2 Final    eGFR if non African  American 06/17/2020 48.6* >60 mL/min/1.73 m^2 Final   Lab Visit on 06/01/2020   Component Date Value Ref Range Status    Estradiol 06/01/2020 <10* See Text pg/mL Final    Testosterone, Free 06/01/2020 0.7  pg/mL Final    Testosterone, Total 06/01/2020 20  5 - 73 ng/dL Final   Office Visit on 06/01/2020   Component Date Value Ref Range Status    Final Pathologic Diagnosis 06/01/2020    Final                    Value:Specimen Adequacy  Satisfactory for interpretation.  [place] Category  Negative for intraepithelial lesion or malignancy.  Atrophic smear.      Disclaimer 06/01/2020    Final                    Value:The Pap smear is a screening test that aids in the detection of cervical  cancer and cancer precursors. Both false positive and false negative results  can occur. The test should be used at regular intervals, and positive results  should be confirmed before definitive therapy.  This liquid based specimen is processed using the  or  Thin PrepPAP  System. This specimen has been analyzed by the ThinPrep Imaging System  (Tus reQRdos), an automated imaging and review system which assists  the laboratory in evaluating cells on ThinPrep PAP tests. Following automated  imaging, selected fields from every slide are reviewed by a cytotechnologist  and/or pathologist.      HPV other High Risk types, PCR 06/01/2020 Negative  Negative Final    HPV High Risk type 16, PCR 06/01/2020 Negative  Negative Final    HPV High Risk type 18, PCR 06/01/2020 Negative  Negative Final   Clinical Support on 05/19/2020   Component Date Value Ref Range Status    Ascending aorta 05/19/2020 2.94  cm Final    STJ 05/19/2020 2.60  cm Final    AV mean gradient 05/19/2020 7  mmHg Final    Ao peak ludy 05/19/2020 1.51  m/s Final    Ao VTI 05/19/2020 30.80  cm Final    IVRT 05/19/2020 134.16  msec Final    IVS 05/19/2020 1.10  0.6 - 1.1 cm Final    LA size 05/19/2020 3.04  cm Final    Left Atrium Major Axis  05/19/2020 5.16  cm Final    Left Atrium Minor Axis 05/19/2020 5.25  cm Final    LVIDD 05/19/2020 3.48* 3.5 - 6.0 cm Final    LVIDS 05/19/2020 2.20  2.1 - 4.0 cm Final    LVOT diameter 05/19/2020 2.33  cm Final    LVOT peak VTI 05/19/2020 24.20  cm Final    PW 05/19/2020 0.91  0.6 - 1.1 cm Final    MV Peak A Tim 05/19/2020 0.98  m/s Final    E wave decelartion time 05/19/2020 220.79  msec Final    MV Peak E Tim 05/19/2020 0.61  m/s Final    PV Peak D Tim 05/19/2020 0.39  m/s Final    PV Peak S Tim 05/19/2020 0.86  m/s Final    RA Major Axis 05/19/2020 4.28  cm Final    RA Width 05/19/2020 3.31  cm Final    RVDD 05/19/2020 4.10  cm Final    Sinus 05/19/2020 3.09  cm Final    TAPSE 05/19/2020 1.53  cm Final    TR Max Tim 05/19/2020 2.26  m/s Final    TDI LATERAL 05/19/2020 0.06  m/s Final    TDI SEPTAL 05/19/2020 0.05  m/s Final    LA WIDTH 05/19/2020 4.19  cm Final    LV Diastolic Volume 05/19/2020 50.06  mL Final    LV Systolic Volume 05/19/2020 16.11  mL Final    LVOT peak tim 05/19/2020 1.10  m/s Final    LV LATERAL E/E' RATIO 05/19/2020 10.17  m/s Final    LV SEPTAL E/E' RATIO 05/19/2020 12.20  m/s Final    FS 05/19/2020 37  % Final    LA volume 05/19/2020 56.35  cm3 Final    LV mass 05/19/2020 103.21  g Final    Left Ventricle Relative Wall Thick* 05/19/2020 0.52  cm Final    AV valve area 05/19/2020 3.35  cm2 Final    AV Velocity Ratio 05/19/2020 0.73   Final    AV index (prosthetic) 05/19/2020 0.79   Final    E/A ratio 05/19/2020 0.62   Final    Mean e' 05/19/2020 0.06  m/s Final    Pulm vein S/D ratio 05/19/2020 2.21   Final    LVOT area 05/19/2020 4.3  cm2 Final    LVOT stroke volume 05/19/2020 103.13  cm3 Final    AV peak gradient 05/19/2020 9  mmHg Final    E/E' ratio 05/19/2020 11.09  m/s Final    LV Systolic Volume Index 05/19/2020 9.1  mL/m2 Final    LV Diastolic Volume Index 05/19/2020 28.24  mL/m2 Final    LA Volume Index 05/19/2020 31.8  mL/m2 Final    LV  Mass Index 05/19/2020 58  g/m2 Final    Triscuspid Valve Regurgitation Pea* 05/19/2020 20  mmHg Final    BSA 05/19/2020 1.81  m2 Final    Right Atrial Pressure (from IVC) 05/19/2020 3  mmHg Final    TV rest pulmonary artery pressure 05/19/2020 23  mmHg Final   Lab Visit on 05/19/2020   Component Date Value Ref Range Status    WBC 05/19/2020 8.38  3.90 - 12.70 K/uL Final    RBC 05/19/2020 4.95  4.00 - 5.40 M/uL Final    Hemoglobin 05/19/2020 14.2  12.0 - 16.0 g/dL Final    Hematocrit 05/19/2020 46.1  37.0 - 48.5 % Final    Mean Corpuscular Volume 05/19/2020 93  82 - 98 fL Final    Mean Corpuscular Hemoglobin 05/19/2020 28.7  27.0 - 31.0 pg Final    Mean Corpuscular Hemoglobin Conc 05/19/2020 30.8* 32.0 - 36.0 g/dL Final    RDW 05/19/2020 12.5  11.5 - 14.5 % Final    Platelets 05/19/2020 220  150 - 350 K/uL Final    MPV 05/19/2020 13.7* 9.2 - 12.9 fL Final    Immature Granulocytes 05/19/2020 0.2  0.0 - 0.5 % Final    Gran # (ANC) 05/19/2020 5.3  1.8 - 7.7 K/uL Final    Immature Grans (Abs) 05/19/2020 0.02  0.00 - 0.04 K/uL Final    Lymph # 05/19/2020 2.3  1.0 - 4.8 K/uL Final    Mono # 05/19/2020 0.7  0.3 - 1.0 K/uL Final    Eos # 05/19/2020 0.1  0.0 - 0.5 K/uL Final    Baso # 05/19/2020 0.04  0.00 - 0.20 K/uL Final    nRBC 05/19/2020 0  0 /100 WBC Final    Gran% 05/19/2020 63.0  38.0 - 73.0 % Final    Lymph% 05/19/2020 27.1  18.0 - 48.0 % Final    Mono% 05/19/2020 8.1  4.0 - 15.0 % Final    Eosinophil% 05/19/2020 1.1  0.0 - 8.0 % Final    Basophil% 05/19/2020 0.5  0.0 - 1.9 % Final    Differential Method 05/19/2020 Automated   Final    Cholesterol 05/19/2020 253* 120 - 199 mg/dL Final    Triglycerides 05/19/2020 67  30 - 150 mg/dL Final    HDL 05/19/2020 78* 40 - 75 mg/dL Final    LDL Cholesterol 05/19/2020 161.6* 63.0 - 159.0 mg/dL Final    Hdl/Cholesterol Ratio 05/19/2020 30.8  20.0 - 50.0 % Final    Total Cholesterol/HDL Ratio 05/19/2020 3.2  2.0 - 5.0 Final    Non-HDL  Cholesterol 2020 175  mg/dL Final    Sodium 2020 143  136 - 145 mmol/L Final    Potassium 2020 3.9  3.5 - 5.1 mmol/L Final    Chloride 2020 106  95 - 110 mmol/L Final    CO2 2020 26  23 - 29 mmol/L Final    Glucose 2020 98  70 - 110 mg/dL Final    BUN, Bld 2020 21  8 - 23 mg/dL Final    Creatinine 2020 1.1  0.5 - 1.4 mg/dL Final    Calcium 2020 9.7  8.7 - 10.5 mg/dL Final    Total Protein 2020 7.9  6.0 - 8.4 g/dL Final    Albumin 2020 4.5  3.5 - 5.2 g/dL Final    Total Bilirubin 2020 0.3  0.1 - 1.0 mg/dL Final    Alkaline Phosphatase 2020 91  55 - 135 U/L Final    AST 2020 22  10 - 40 U/L Final    ALT 2020 14  10 - 44 U/L Final    Anion Gap 2020 11  8 - 16 mmol/L Final    eGFR if African American 2020 >60.0  >60 mL/min/1.73 m^2 Final    eGFR if non African American 2020 53.9* >60 mL/min/1.73 m^2 Final    Free T4 2020 1.30  0.71 - 1.51 ng/dL Final    TSH 2020 2.376  0.400 - 4.000 uIU/mL Final    Antibody SARS CoV-2 2020 Negative  Negative Final    HIV 1/2 Ag/Ab 2020 Negative  Negative Final    Hepatitis C Ab 2020 Negative  Negative Final       Past Medical History:   Diagnosis Date    Anemia     Cardiac anomaly, congenital     pt had a repair for ?ASD ' at [facility] with Dr. Aguilar    Congenital heart anomaly     Fibroids     uterine     History of migraine     Hyperlipidemia     Hypertension     Insomnia     Nicotine dependence in remission     PUD (peptic ulcer disease)      Past Surgical History:   Procedure Laterality Date    HYSTERECTOMY  2014    repair of congenital heart disease  1998    Repair of ASD     Social History     Tobacco Use    Smoking status: Former Smoker     Types: Cigarettes     Quit date: 2004     Years since quittin.1    Smokeless tobacco: Never Used    Tobacco comment: a few cig on occasion x 2 yrs   Substance  Use Topics    Alcohol use: Yes     Alcohol/week: 5.0 standard drinks     Types: 5 Glasses of wine per week     Frequency: 4 or more times a week     Drinks per session: 1 or 2     Binge frequency: Never    Drug use: No     Family History   Problem Relation Age of Onset    Heart disease Mother     Hypertension Mother     Heart disease Father     Rheum arthritis Sister     Diabetes Brother     Heart attacks under age 50 Maternal Grandfather 42    Breast cancer Neg Hx     Colon cancer Neg Hx     Ovarian cancer Neg Hx     Stroke Neg Hx      OB History    Para Term  AB Living   2 2 2     2   SAB TAB Ectopic Multiple Live Births           2      # Outcome Date GA Lbr Peter/2nd Weight Sex Delivery Anes PTL Lv   2 Term     M Vag-Spont   GORGE   1 Term     F Vag-Spont   GORGE       Current Outpatient Medications:     amLODIPine (NORVASC) 10 MG tablet, TAKE 1 TABLET BY MOUTH ONCE DAILY., Disp: 90 tablet, Rfl: 0    hydroCHLOROthiazide (HYDRODIURIL) 12.5 MG Tab, Take 1 tablet (12.5 mg total) by mouth once daily., Disp: 90 tablet, Rfl: 1    lisinopriL (PRINIVIL,ZESTRIL) 20 MG tablet, 1 tab daily, Disp: 90 tablet, Rfl: 1    metFORMIN (GLUCOPHAGE) 500 MG tablet, 1 Po QD-TID as directed, Disp: 90 tablet, Rfl: 3    testosterone cypionate (DEPOTESTOTERONE CYPIONATE) 200 mg/mL injection, Inject 50 mg into the muscle every 28 days., Disp: , Rfl:     Review of Systems:  General: No fever, chills, or weight loss.  Chest: No chest pain, shortness of breath, or palpitations.  Breast: No pain, masses, or nipple discharge.  Vulva: No pain, lesions, or itching.  Vagina: No relaxation, itching, discharge, or lesions.  Abdomen: No pain, nausea, vomiting, diarrhea, or constipation.  Urinary: No incontinence, nocturia, frequency, or dysuria.  Extremities:  No leg cramps, edema, or calf pain.  Neurologic: No headaches, dizziness, or visual changes.    Objective:     Vitals:    20 1127   BP: (!) 152/102   Weight: 73.1  "kg (161 lb 2.5 oz)   Height: 5' 3" (1.6 m)   PainSc: 0-No pain     Body mass index is 28.55 kg/m².    PHYSICAL EXAM:  APPEARANCE: Well nourished, well developed, in no acute distress.  AFFECT: WNL, alert and oriented x 3  SKIN: No acne or hirsutism  CHEST: Good respiratory effect    Assessment:      Metabolic syndrome/Overweight: Decreased metabolism and changed body composition with menopause caused weight gain. Eating more calories than she realizes. Will put at strict calorie deficit to lose weight. Metabolic syndrome and would love to use Victoza to help. However, does not have insurance currently so will try metformin.   -     metFORMIN (GLUCOPHAGE) 500 MG tablet; 1 Po QD-TID as directed  Dispense: 90 tablet; Refill: 3        Plan:     Developed meal plan with handout of preferred foods:  Breakfast: Coffee with almond creamer; Sampson toast with eggs   Lunch: Salad with protein (vinegar with EVOO)  Dinner: Protein and vegetables (optional one serving of carb-1/3 cup)  Snacks: 1 glass of wine with dinner if able to stay below 1200 calories.     Try to get small amount of protein the AM to improve energy levels.  Log in Myfitnesspal with goal of 1200 calories a day (35% protein, 35% carbs (mostly vegetables/fruits), 30% fat)  Start metformin 500mg QD. Can increase to TID as can tolerate.  Reviewed side effects and risks of medication.     Instructed patient to call if she experiences any side effects or has any questions.  I spent 25 minutes with this patient today, >50% counseling.     Follow up as needed.      "

## 2020-09-24 ENCOUNTER — TELEPHONE (OUTPATIENT)
Dept: PRIMARY CARE CLINIC | Facility: CLINIC | Age: 60
End: 2020-09-24

## 2020-09-25 ENCOUNTER — PATIENT MESSAGE (OUTPATIENT)
Dept: OTHER | Facility: OTHER | Age: 60
End: 2020-09-25

## 2020-09-30 ENCOUNTER — PATIENT MESSAGE (OUTPATIENT)
Dept: PRIMARY CARE CLINIC | Facility: CLINIC | Age: 60
End: 2020-09-30

## 2020-10-09 ENCOUNTER — TELEPHONE (OUTPATIENT)
Dept: PRIMARY CARE CLINIC | Facility: CLINIC | Age: 60
End: 2020-10-09

## 2021-01-30 DIAGNOSIS — I16.0 HYPERTENSIVE URGENCY: ICD-10-CM

## 2021-01-30 DIAGNOSIS — I10 HYPERTENSION, UNSPECIFIED TYPE: ICD-10-CM

## 2021-01-31 RX ORDER — AMLODIPINE BESYLATE 10 MG/1
TABLET ORAL
Qty: 90 TABLET | Refills: 0 | Status: SHIPPED | OUTPATIENT
Start: 2021-01-31 | End: 2021-06-28 | Stop reason: SDUPTHER

## 2021-01-31 RX ORDER — HYDROCHLOROTHIAZIDE 12.5 MG/1
12.5 TABLET ORAL DAILY
Qty: 90 TABLET | Refills: 0 | Status: SHIPPED | OUTPATIENT
Start: 2021-01-31 | End: 2021-06-28 | Stop reason: SDUPTHER

## 2021-03-10 ENCOUNTER — IMMUNIZATION (OUTPATIENT)
Dept: PRIMARY CARE CLINIC | Facility: CLINIC | Age: 61
End: 2021-03-10
Payer: MEDICAID

## 2021-03-10 DIAGNOSIS — Z23 NEED FOR VACCINATION: Primary | ICD-10-CM

## 2021-03-10 PROCEDURE — 91303 PR SARSCOV2 VAC AD26 .5ML IM: CPT | Mod: S$GLB,,, | Performed by: INTERNAL MEDICINE

## 2021-03-10 PROCEDURE — 0031A PR IMMUNIZ ADMIN, SARS-COV-2 COVID-19 VACC, 5X10VP/0.5ML: ICD-10-PCS | Mod: CV19,S$GLB,, | Performed by: INTERNAL MEDICINE

## 2021-03-10 PROCEDURE — 91303 PR SARSCOV2 VAC AD26 .5ML IM: ICD-10-PCS | Mod: S$GLB,,, | Performed by: INTERNAL MEDICINE

## 2021-03-10 PROCEDURE — 0031A PR IMMUNIZ ADMIN, SARS-COV-2 COVID-19 VACC, 5X10VP/0.5ML: CPT | Mod: CV19,S$GLB,, | Performed by: INTERNAL MEDICINE

## 2021-03-15 DIAGNOSIS — E88.810 METABOLIC SYNDROME: ICD-10-CM

## 2021-03-15 RX ORDER — METFORMIN HYDROCHLORIDE 500 MG/1
TABLET ORAL
Qty: 90 TABLET | Refills: 1 | Status: SHIPPED | OUTPATIENT
Start: 2021-03-15 | End: 2022-02-15 | Stop reason: SDUPTHER

## 2021-06-28 ENCOUNTER — LAB VISIT (OUTPATIENT)
Dept: LAB | Facility: HOSPITAL | Age: 61
End: 2021-06-28
Attending: INTERNAL MEDICINE
Payer: MEDICAID

## 2021-06-28 ENCOUNTER — OFFICE VISIT (OUTPATIENT)
Dept: PRIMARY CARE CLINIC | Facility: CLINIC | Age: 61
End: 2021-06-28
Payer: MEDICAID

## 2021-06-28 ENCOUNTER — PATIENT MESSAGE (OUTPATIENT)
Dept: PHARMACY | Facility: CLINIC | Age: 61
End: 2021-06-28

## 2021-06-28 ENCOUNTER — TELEPHONE (OUTPATIENT)
Dept: PRIMARY CARE CLINIC | Facility: CLINIC | Age: 61
End: 2021-06-28

## 2021-06-28 ENCOUNTER — PATIENT MESSAGE (OUTPATIENT)
Dept: PRIMARY CARE CLINIC | Facility: CLINIC | Age: 61
End: 2021-06-28

## 2021-06-28 VITALS
SYSTOLIC BLOOD PRESSURE: 150 MMHG | HEART RATE: 92 BPM | OXYGEN SATURATION: 99 % | BODY MASS INDEX: 24.61 KG/M2 | DIASTOLIC BLOOD PRESSURE: 88 MMHG | TEMPERATURE: 98 F | HEIGHT: 63 IN | WEIGHT: 138.88 LBS | RESPIRATION RATE: 18 BRPM

## 2021-06-28 DIAGNOSIS — Z12.31 SCREENING MAMMOGRAM, ENCOUNTER FOR: ICD-10-CM

## 2021-06-28 DIAGNOSIS — Z00.00 NORMAL PHYSICAL EXAM, ROUTINE: ICD-10-CM

## 2021-06-28 DIAGNOSIS — Z00.00 NORMAL PHYSICAL EXAM, ROUTINE: Primary | ICD-10-CM

## 2021-06-28 DIAGNOSIS — I10 HYPERTENSION, UNSPECIFIED TYPE: ICD-10-CM

## 2021-06-28 DIAGNOSIS — E78.5 HYPERLIPIDEMIA, UNSPECIFIED HYPERLIPIDEMIA TYPE: ICD-10-CM

## 2021-06-28 LAB
ALBUMIN SERPL BCP-MCNC: 4.2 G/DL (ref 3.5–5.2)
ALP SERPL-CCNC: 74 U/L (ref 55–135)
ALT SERPL W/O P-5'-P-CCNC: 12 U/L (ref 10–44)
ANION GAP SERPL CALC-SCNC: 13 MMOL/L (ref 8–16)
AST SERPL-CCNC: 17 U/L (ref 10–40)
BILIRUB SERPL-MCNC: 0.4 MG/DL (ref 0.1–1)
BUN SERPL-MCNC: 20 MG/DL (ref 8–23)
CALCIUM SERPL-MCNC: 10 MG/DL (ref 8.7–10.5)
CHLORIDE SERPL-SCNC: 105 MMOL/L (ref 95–110)
CHOLEST SERPL-MCNC: 249 MG/DL (ref 120–199)
CHOLEST/HDLC SERPL: 3.8 {RATIO} (ref 2–5)
CO2 SERPL-SCNC: 25 MMOL/L (ref 23–29)
CREAT SERPL-MCNC: 1 MG/DL (ref 0.5–1.4)
EST. GFR  (AFRICAN AMERICAN): >60 ML/MIN/1.73 M^2
EST. GFR  (NON AFRICAN AMERICAN): >60 ML/MIN/1.73 M^2
ESTIMATED AVG GLUCOSE: 100 MG/DL (ref 68–131)
GLUCOSE SERPL-MCNC: 91 MG/DL (ref 70–110)
HBA1C MFR BLD: 5.1 % (ref 4–5.6)
HDLC SERPL-MCNC: 66 MG/DL (ref 40–75)
HDLC SERPL: 26.5 % (ref 20–50)
LDLC SERPL CALC-MCNC: 163.4 MG/DL (ref 63–159)
NONHDLC SERPL-MCNC: 183 MG/DL
POTASSIUM SERPL-SCNC: 3.7 MMOL/L (ref 3.5–5.1)
PROT SERPL-MCNC: 7.6 G/DL (ref 6–8.4)
SODIUM SERPL-SCNC: 143 MMOL/L (ref 136–145)
T4 FREE SERPL-MCNC: 1.25 NG/DL (ref 0.71–1.51)
TRIGL SERPL-MCNC: 98 MG/DL (ref 30–150)
TSH SERPL DL<=0.005 MIU/L-ACNC: 2.53 UIU/ML (ref 0.4–4)

## 2021-06-28 PROCEDURE — 99999 PR PBB SHADOW E&M-EST. PATIENT-LVL IV: ICD-10-PCS | Mod: PBBFAC,,, | Performed by: INTERNAL MEDICINE

## 2021-06-28 PROCEDURE — 84443 ASSAY THYROID STIM HORMONE: CPT | Performed by: INTERNAL MEDICINE

## 2021-06-28 PROCEDURE — 36415 COLL VENOUS BLD VENIPUNCTURE: CPT | Mod: PN | Performed by: INTERNAL MEDICINE

## 2021-06-28 PROCEDURE — 99396 PREV VISIT EST AGE 40-64: CPT | Mod: S$PBB,,, | Performed by: INTERNAL MEDICINE

## 2021-06-28 PROCEDURE — 99396 PR PREVENTIVE VISIT,EST,40-64: ICD-10-PCS | Mod: S$PBB,,, | Performed by: INTERNAL MEDICINE

## 2021-06-28 PROCEDURE — 84439 ASSAY OF FREE THYROXINE: CPT | Performed by: INTERNAL MEDICINE

## 2021-06-28 PROCEDURE — 99999 PR PBB SHADOW E&M-EST. PATIENT-LVL IV: CPT | Mod: PBBFAC,,, | Performed by: INTERNAL MEDICINE

## 2021-06-28 PROCEDURE — 80053 COMPREHEN METABOLIC PANEL: CPT | Performed by: INTERNAL MEDICINE

## 2021-06-28 PROCEDURE — 83036 HEMOGLOBIN GLYCOSYLATED A1C: CPT | Performed by: INTERNAL MEDICINE

## 2021-06-28 PROCEDURE — 80061 LIPID PANEL: CPT | Performed by: INTERNAL MEDICINE

## 2021-06-28 PROCEDURE — 99214 OFFICE O/P EST MOD 30 MIN: CPT | Mod: PBBFAC,PN | Performed by: INTERNAL MEDICINE

## 2021-06-28 RX ORDER — HYDROCHLOROTHIAZIDE 25 MG/1
25 TABLET ORAL DAILY
Qty: 90 TABLET | Refills: 0 | Status: SHIPPED | OUTPATIENT
Start: 2021-06-28 | End: 2021-08-17 | Stop reason: SDUPTHER

## 2021-06-28 RX ORDER — AMLODIPINE BESYLATE 10 MG/1
10 TABLET ORAL DAILY
Qty: 90 TABLET | Refills: 0 | Status: SHIPPED | OUTPATIENT
Start: 2021-06-28 | End: 2021-08-17 | Stop reason: SDUPTHER

## 2021-06-28 RX ORDER — LISINOPRIL 20 MG/1
TABLET ORAL
Qty: 90 TABLET | Refills: 1 | Status: SHIPPED | OUTPATIENT
Start: 2021-06-28 | End: 2021-08-17 | Stop reason: SDUPTHER

## 2021-06-28 RX ORDER — TRIAMCINOLONE ACETONIDE 5 MG/G
OINTMENT TOPICAL 2 TIMES DAILY
Qty: 15 G | Refills: 0 | Status: SHIPPED | OUTPATIENT
Start: 2021-06-28

## 2021-06-29 ENCOUNTER — TELEPHONE (OUTPATIENT)
Dept: PRIMARY CARE CLINIC | Facility: CLINIC | Age: 61
End: 2021-06-29

## 2021-06-30 ENCOUNTER — PATIENT MESSAGE (OUTPATIENT)
Dept: PRIMARY CARE CLINIC | Facility: CLINIC | Age: 61
End: 2021-06-30

## 2021-06-30 DIAGNOSIS — E78.5 HYPERLIPIDEMIA, UNSPECIFIED HYPERLIPIDEMIA TYPE: Primary | ICD-10-CM

## 2021-07-07 ENCOUNTER — PATIENT MESSAGE (OUTPATIENT)
Dept: ADMINISTRATIVE | Facility: HOSPITAL | Age: 61
End: 2021-07-07

## 2021-07-20 ENCOUNTER — HOSPITAL ENCOUNTER (OUTPATIENT)
Dept: RADIOLOGY | Facility: HOSPITAL | Age: 61
Discharge: HOME | End: 2021-07-20
Attending: INTERNAL MEDICINE
Payer: MEDICAID

## 2021-07-20 VITALS — WEIGHT: 138 LBS | HEIGHT: 64 IN | BODY MASS INDEX: 23.56 KG/M2

## 2021-07-20 DIAGNOSIS — Z12.31 SCREENING MAMMOGRAM, ENCOUNTER FOR: ICD-10-CM

## 2021-07-20 PROCEDURE — 77067 SCR MAMMO BI INCL CAD: CPT | Mod: TC,PN

## 2021-07-20 PROCEDURE — 77063 BREAST TOMOSYNTHESIS BI: CPT | Mod: 26,,, | Performed by: RADIOLOGY

## 2021-07-20 PROCEDURE — 77063 MAMMO DIGITAL SCREENING BILAT WITH TOMO: ICD-10-PCS | Mod: 26,,, | Performed by: RADIOLOGY

## 2021-07-20 PROCEDURE — 77067 MAMMO DIGITAL SCREENING BILAT WITH TOMO: ICD-10-PCS | Mod: 26,,, | Performed by: RADIOLOGY

## 2021-07-20 PROCEDURE — 77067 SCR MAMMO BI INCL CAD: CPT | Mod: 26,,, | Performed by: RADIOLOGY

## 2021-08-17 ENCOUNTER — OFFICE VISIT (OUTPATIENT)
Dept: PRIMARY CARE CLINIC | Facility: CLINIC | Age: 61
End: 2021-08-17
Payer: MEDICAID

## 2021-08-17 ENCOUNTER — PATIENT MESSAGE (OUTPATIENT)
Dept: PRIMARY CARE CLINIC | Facility: CLINIC | Age: 61
End: 2021-08-17

## 2021-08-17 VITALS
HEART RATE: 94 BPM | DIASTOLIC BLOOD PRESSURE: 74 MMHG | TEMPERATURE: 98 F | HEIGHT: 64 IN | WEIGHT: 138.69 LBS | RESPIRATION RATE: 18 BRPM | SYSTOLIC BLOOD PRESSURE: 132 MMHG | OXYGEN SATURATION: 99 % | BODY MASS INDEX: 23.68 KG/M2

## 2021-08-17 DIAGNOSIS — I10 HYPERTENSION, UNSPECIFIED TYPE: ICD-10-CM

## 2021-08-17 DIAGNOSIS — E78.5 HYPERLIPIDEMIA, UNSPECIFIED HYPERLIPIDEMIA TYPE: Primary | ICD-10-CM

## 2021-08-17 PROCEDURE — 99999 PR PBB SHADOW E&M-EST. PATIENT-LVL IV: CPT | Mod: PBBFAC,,, | Performed by: INTERNAL MEDICINE

## 2021-08-17 PROCEDURE — 99213 OFFICE O/P EST LOW 20 MIN: CPT | Mod: S$PBB,,, | Performed by: INTERNAL MEDICINE

## 2021-08-17 PROCEDURE — 99214 OFFICE O/P EST MOD 30 MIN: CPT | Mod: PBBFAC,PN | Performed by: INTERNAL MEDICINE

## 2021-08-17 PROCEDURE — 99213 PR OFFICE/OUTPT VISIT, EST, LEVL III, 20-29 MIN: ICD-10-PCS | Mod: S$PBB,,, | Performed by: INTERNAL MEDICINE

## 2021-08-17 PROCEDURE — 99999 PR PBB SHADOW E&M-EST. PATIENT-LVL IV: ICD-10-PCS | Mod: PBBFAC,,, | Performed by: INTERNAL MEDICINE

## 2021-08-17 RX ORDER — AMLODIPINE BESYLATE 10 MG/1
10 TABLET ORAL DAILY
Qty: 90 TABLET | Refills: 1 | Status: SHIPPED | OUTPATIENT
Start: 2021-08-17 | End: 2022-02-07

## 2021-08-17 RX ORDER — HYDROCHLOROTHIAZIDE 25 MG/1
25 TABLET ORAL DAILY
Qty: 90 TABLET | Refills: 1 | Status: SHIPPED | OUTPATIENT
Start: 2021-08-17 | End: 2022-02-07

## 2021-08-17 RX ORDER — LISINOPRIL 20 MG/1
TABLET ORAL
Qty: 90 TABLET | Refills: 1 | Status: SHIPPED | OUTPATIENT
Start: 2021-08-17 | End: 2022-02-15 | Stop reason: SDUPTHER

## 2021-08-27 ENCOUNTER — PATIENT OUTREACH (OUTPATIENT)
Dept: ADMINISTRATIVE | Facility: HOSPITAL | Age: 61
End: 2021-08-27

## 2021-08-27 ENCOUNTER — PATIENT MESSAGE (OUTPATIENT)
Dept: PRIMARY CARE CLINIC | Facility: CLINIC | Age: 61
End: 2021-08-27

## 2021-09-27 PROBLEM — Z00.00 NORMAL PHYSICAL EXAM, ROUTINE: Status: RESOLVED | Noted: 2021-06-28 | Resolved: 2021-09-27

## 2021-10-07 ENCOUNTER — PATIENT MESSAGE (OUTPATIENT)
Dept: PRIMARY CARE CLINIC | Facility: CLINIC | Age: 61
End: 2021-10-07

## 2021-10-19 ENCOUNTER — PATIENT MESSAGE (OUTPATIENT)
Dept: PRIMARY CARE CLINIC | Facility: CLINIC | Age: 61
End: 2021-10-19
Payer: MEDICAID

## 2021-10-27 ENCOUNTER — LAB VISIT (OUTPATIENT)
Dept: LAB | Facility: HOSPITAL | Age: 61
End: 2021-10-27
Attending: INTERNAL MEDICINE
Payer: MEDICAID

## 2021-10-27 DIAGNOSIS — E78.5 HYPERLIPIDEMIA, UNSPECIFIED HYPERLIPIDEMIA TYPE: ICD-10-CM

## 2021-10-27 LAB
CHOLEST SERPL-MCNC: 223 MG/DL (ref 120–199)
CHOLEST/HDLC SERPL: 3 {RATIO} (ref 2–5)
HDLC SERPL-MCNC: 75 MG/DL (ref 40–75)
HDLC SERPL: 33.6 % (ref 20–50)
LDLC SERPL CALC-MCNC: 115.6 MG/DL (ref 63–159)
NONHDLC SERPL-MCNC: 148 MG/DL
TRIGL SERPL-MCNC: 162 MG/DL (ref 30–150)

## 2021-10-27 PROCEDURE — 36415 COLL VENOUS BLD VENIPUNCTURE: CPT | Mod: PN | Performed by: INTERNAL MEDICINE

## 2021-10-27 PROCEDURE — 80061 LIPID PANEL: CPT | Performed by: INTERNAL MEDICINE

## 2022-02-13 NOTE — PROGRESS NOTES
[facility] Note    Chief Complaint      Chief Complaint   Patient presents with    Follow-up       History of Present Illness      Cary Claudio is a 61 y.o. HTN, Anemia, Duodenal ulcer presents to fu HTN. Last visit -8/17/21.       HLD -     HDL 75. Her cholesterol looks much better. No further recommendations at this time.     HTN - Pt  on Norvasc 10 mg/d, HCTZ 25 mg, lisinopril 20 mg/d. Rare swelling.  Rec low sodium diet. She is doing well on this.  Echo -5/19/20 -  Concentric left ventricular remodeling, EF - 65%. Grade I (mild) left ventricular diastolic dysfunction consistent with impaired relaxation. Mild TR. Mildly reduced right ventricular systolic function. Mild Ventricular Enlargement. Note - potential ASD repair - inter-atrial septum does appear thickened and hyperechoic which could represent repair. No flow by color doppler. No evidence of significant RV dilation or dysfunction.  BUN/Cr - 20/1.0 - 6/28/21     H/o Duodenal ulcer - No longer on Omeprazole. Rec Avoid NSAIDS.     Osteopenia - Pt on Calcium and Vit D.  She exercises.     Overweight  BMI - 26.11- up 14 lb. Pt walks most days. She plays tennis intermittently. She lost 25 lb prev on Metformin.  She recently started Testoterone. Pt was started on Metformin 1  per OB for wt loss but ran out. She felt better on the Metformin.  Ha1c - 5.1.      HCM - Flu -admin 2/15/22;  Tdap - 5/19/20;  PCV 13 - none;  PVX 23 - none;  Shingrix - #1 - 6/28/21 and #2 - 10/27/21; Zostavax - 10/8/14;  COVID - 19 Vaccine (J&J) - 3/10/21; #2 - declines;  MGM -7/20/21- repeat 1 yr;   Left breast U/S 6/19/20 - benign simple cyst - repeat MGM in 1 yr  DEXA - 6/16/20 - Osteopenia- repeat 3 yrs;  PAP - 6/1/20 - neg; pt s/p partial Hys; Hep C Screen -5/19/20 neg;  HIV Screen - 5/19/20- neg; C-scope - 11/21/14 - repeat 10 yrs; Prev PCP - Dr. Rutledge; Ophtho - Dr. Martinez; Ob/GYN - Dr. Borges; well visit - 6/28/21    Patient Care  Team:  Nicole Rich MD as PCP - General (Internal Medicine)  Florecita Cochran MA as Care Coordinator     Health Maintenance:  Immunization History   Administered Date(s) Administered    COVID-19, vector-nr, rS-Ad26, PF (Jorge) 03/10/2021    Influenza - Quadrivalent 10/08/2014    Influenza - Quadrivalent - PF *Preferred* (6 months and older) 02/15/2022    Tdap 2020    Zoster 10/08/2014    Zoster Recombinant 2021, 10/27/2021      Health Maintenance   Topic Date Due    DEXA SCAN  2022    Mammogram  2022    Lipid Panel  10/27/2022    TETANUS VACCINE  2030    Hepatitis C Screening  Completed        Past Medical History:  Past Medical History:   Diagnosis Date    Anemia     Cardiac anomaly, congenital     pt had a repair for ?ASD '[facility] with [facility]. Aguilar    Congenital heart anomaly     Fibroids     uterine     History of migraine     Hyperlipidemia     Hypertension     Insomnia     Nicotine dependence in remission     PUD (peptic ulcer disease)        Past Surgical History:   has a past surgical history that includes repair of congenital heart disease () and Hysterectomy ().    Family History:  family history includes Diabetes in her brother; Heart attacks under age 50 (age of onset: 42) in her maternal grandfather; Heart disease in her father and mother; Hypertension in her mother; Rheum arthritis in her sister.     Social History:  Social History     Tobacco Use    Smoking status: Former Smoker     Types: Cigarettes     Quit date: 2004     Years since quittin.6    Smokeless tobacco: Never Used    Tobacco comment: a few cig on occasion x 2 yrs   Substance Use Topics    Alcohol use: Yes     Alcohol/week: 5.0 standard drinks     Types: 5 Glasses of wine per week    Drug use: No       Review of Systems   Constitutional: Negative for chills, diaphoresis and fever.   HENT: Negative for nasal congestion and sore throat.    Eyes:  "Positive for visual disturbance.        Just saw Ophtho -monitoring her Ocular pressure.   Respiratory: Negative for cough and shortness of breath.    Cardiovascular: Negative for chest pain and palpitations.   Gastrointestinal: Negative for abdominal pain, blood in stool, constipation, diarrhea, nausea and vomiting.   Genitourinary: Positive for urgency. Negative for bladder incontinence, dysuria and frequency.   Musculoskeletal: Negative for arthralgias and myalgias.   Neurological: Negative for dizziness and headaches.   Psychiatric/Behavioral: Negative for dysphoric mood. The patient is not nervous/anxious.         Medications:    Current Outpatient Medications:     RESTASIS 0.05 % ophthalmic emulsion, , Disp: , Rfl:     triamcinolone (KENALOG) 0.5 % ointment, Apply topically 2 (two) times daily. Apply to affected area BID x 2 wks prn rash, Disp: 15 g, Rfl: 0    amLODIPine (NORVASC) 10 MG tablet, Take 1 tablet (10 mg total) by mouth once daily., Disp: 90 tablet, Rfl: 1    flu vacc [id number] 6mos up,PF, (FLUARIX QUAD [id number], PF,) 60 mcg (15 mcg x 4)/0.5 mL Syrg, Inject in the muscle, Disp: 0.5 mL, Rfl: 0    hydroCHLOROthiazide (HYDRODIURIL) 25 MG tablet, Take 1 tablet (25 mg total) by mouth once daily., Disp: 90 tablet, Rfl: 1    lisinopriL (PRINIVIL,ZESTRIL) 20 MG tablet, 1 tab daily, Disp: 90 tablet, Rfl: 1    metFORMIN (GLUCOPHAGE) 500 MG tablet, 1 Po QD, Disp: 90 tablet, Rfl: 1     Allergies:  Review of patient's allergies indicates:   Allergen Reactions    Compazine [prochlorperazine] Swelling     Tongue swelling    Pcn [penicillins] Swelling     Tongue swelling       Physical Exam      Vital Signs  Temp: 98 °F (36.7 °C)  Pulse: 92  Resp: 18  SpO2: 99 %  BP: 127/67  BP Location: Right arm  Patient Position: Sitting  Pain Score: 0-No pain  Height and Weight  Height: 5' 4" (162.6 cm)  Weight: 69 kg (152 lb 1.9 oz)  BSA (Calculated - sq m): 1.77 sq meters  BMI (Calculated): 26.1  Weight in (lb) to " have BMI = 25: 145.3      Patient Position: Sitting      Physical Exam  Vitals reviewed.   Constitutional:       General: She is not in acute distress.     Appearance: Normal appearance. She is not ill-appearing, toxic-appearing or diaphoretic.   HENT:      Head: Normocephalic and atraumatic.      Right Ear: Tympanic membrane normal.      Left Ear: Tympanic membrane normal.   Eyes:      Extraocular Movements: Extraocular movements intact.      Conjunctiva/sclera: Conjunctivae normal.      Pupils: Pupils are equal, round, and reactive to light.   Neck:      Vascular: No carotid bruit.   Cardiovascular:      Rate and Rhythm: Normal rate and regular rhythm.      Pulses: Normal pulses.      Heart sounds: Normal heart sounds. No murmur heard.      Pulmonary:      Effort: No respiratory distress.      Breath sounds: Normal breath sounds. No wheezing.   Abdominal:      General: Bowel sounds are normal. There is no distension.      Palpations: Abdomen is soft.      Tenderness: There is no abdominal tenderness. There is no guarding or rebound.   Musculoskeletal:         General: Normal range of motion.   Skin:     General: Skin is warm and dry.   Neurological:      General: No focal deficit present.      Mental Status: She is alert.   Psychiatric:         Mood and Affect: Mood normal.         Behavior: Behavior normal.          Laboratory:  CBC:  Recent Labs   Lab 05/19/20  1125   WBC 8.38   RBC 4.95   Hemoglobin 14.2   Hematocrit 46.1   Platelets 220   MCV 93   MCH 28.7   MCHC 30.8 L       CMP:  Recent Labs   Lab 05/19/20  1125 06/17/20  1031 06/28/21  0814 06/28/21  0814 02/15/22  0958   Glucose 98   < > 91   < > 84   Calcium 9.7   < > 10.0   < > 10.0   Albumin 4.5  --  4.2  --   --    Total Protein 7.9  --  7.6  --   --    Sodium 143   < > 143   < > 140   Potassium 3.9   < > 3.7   < > 3.9   CO2 26   < > 25   < > 29   Chloride 106   < > 105   < > 100   BUN 21   < > 20   < > 25 H   Creatinine 1.1   < > 1.0   < > 1.0    Alkaline Phosphatase 91  --  74  --   --    ALT 14  --  12  --   --    AST 22  --  17  --   --    Total Bilirubin 0.3  --  0.4  --   --     < > = values in this interval not displayed.       LIPIDS:  Recent Labs   Lab 05/19/20  1125 06/28/21  0814 10/27/21  0845   TSH 2.376 2.527  --    HDL 78 H 66 75   Cholesterol 253 H 249 H 223 H   Triglycerides 67 98 162 H   LDL Cholesterol 161.6 H 163.4 H 115.6   HDL/Cholesterol Ratio 30.8 26.5 33.6   Non-HDL Cholesterol 175 183 148   Total Cholesterol/HDL Ratio 3.2 3.8 3.0       TSH:  Recent Labs   Lab 05/19/20  1125 06/28/21  0814   TSH 2.376 2.527       A1C:  Recent Labs   Lab 06/28/21  0814   Hemoglobin A1C 5.1       Other:   Recent Labs   Lab 06/01/20  1454   Testosterone, Total 20   Estradiol <10 A     Recent Labs   Lab 05/19/20  1125   Hepatitis C Ab Negative       Assessment/Plan     Cary Claudio is a 61 y.o.female with:    Hypertension, unspecified type  -     amLODIPine (NORVASC) 10 MG tablet; Take 1 tablet (10 mg total) by mouth once daily.  Dispense: 90 tablet; Refill: 1  -     hydroCHLOROthiazide (HYDRODIURIL) 25 MG tablet; Take 1 tablet (25 mg total) by mouth once daily.  Dispense: 90 tablet; Refill: 1  -     lisinopriL (PRINIVIL,ZESTRIL) 20 MG tablet; 1 tab daily  Dispense: 90 tablet; Refill: 1  -     Basic Metabolic Panel; Future; Expected date: 02/15/2022  - Controlled.  Cont current.     Metabolic syndrome  -     metFORMIN (GLUCOPHAGE) 500 MG tablet; 1 Po QD  Dispense: 90 tablet; Refill: 1  - Resume Metformin.     Hyperlipidemia, unspecified hyperlipidemia type  -     Lipid Panel; Future; Expected date: 06/29/2022  - Controlled.  Cont current.     Osteopenia, unspecified location  - Stable.  Cont current regimen.    Overweight (BMI 25.0-29.9)  - Cont diet and exercise. Will resume Metformin.     Screening mammogram, encounter for  -     Mammo Digital Screening Bilat w/ Ang; Future; Expected date: 07/21/2022    Other Orders  -     CBC Auto Differential;  Future; Expected date: 06/29/2022  -     Comprehensive Metabolic Panel; Future; Expected date: 06/29/2022  -     T4, Free; Future; Expected date: 06/29/2022  -     TSH; Future; Expected date: 06/29/2022  -     Hemoglobin A1C; Future; Expected date: 06/29/2022         Chronic conditions status updated as per HPI.  Other than changes above, cont current medications and maintain follow up with specialists.  Follow up in about 5 months (around 7/4/2022) for well visit or sooner if needed.      Nicole Rich MD  [facility]

## 2022-02-15 ENCOUNTER — OFFICE VISIT (OUTPATIENT)
Dept: PRIMARY CARE CLINIC | Facility: CLINIC | Age: 62
End: 2022-02-15
Payer: MEDICAID

## 2022-02-15 ENCOUNTER — LAB VISIT (OUTPATIENT)
Dept: LAB | Facility: HOSPITAL | Age: 62
End: 2022-02-15
Attending: INTERNAL MEDICINE
Payer: MEDICAID

## 2022-02-15 VITALS
DIASTOLIC BLOOD PRESSURE: 67 MMHG | WEIGHT: 152.13 LBS | BODY MASS INDEX: 25.97 KG/M2 | HEIGHT: 64 IN | HEART RATE: 92 BPM | OXYGEN SATURATION: 99 % | SYSTOLIC BLOOD PRESSURE: 127 MMHG | RESPIRATION RATE: 18 BRPM | TEMPERATURE: 98 F

## 2022-02-15 DIAGNOSIS — Z12.31 SCREENING MAMMOGRAM, ENCOUNTER FOR: ICD-10-CM

## 2022-02-15 DIAGNOSIS — I10 HYPERTENSION, UNSPECIFIED TYPE: ICD-10-CM

## 2022-02-15 DIAGNOSIS — M85.80 OSTEOPENIA, UNSPECIFIED LOCATION: ICD-10-CM

## 2022-02-15 DIAGNOSIS — I10 HYPERTENSION, UNSPECIFIED TYPE: Primary | ICD-10-CM

## 2022-02-15 DIAGNOSIS — E78.5 HYPERLIPIDEMIA, UNSPECIFIED HYPERLIPIDEMIA TYPE: ICD-10-CM

## 2022-02-15 DIAGNOSIS — E88.810 METABOLIC SYNDROME: ICD-10-CM

## 2022-02-15 DIAGNOSIS — Z00.00 NORMAL PHYSICAL EXAM, ROUTINE: ICD-10-CM

## 2022-02-15 DIAGNOSIS — E66.3 OVERWEIGHT (BMI 25.0-29.9): ICD-10-CM

## 2022-02-15 LAB
ANION GAP SERPL CALC-SCNC: 11 MMOL/L (ref 8–16)
BUN SERPL-MCNC: 25 MG/DL (ref 8–23)
CALCIUM SERPL-MCNC: 10 MG/DL (ref 8.7–10.5)
CHLORIDE SERPL-SCNC: 100 MMOL/L (ref 95–110)
CO2 SERPL-SCNC: 29 MMOL/L (ref 23–29)
CREAT SERPL-MCNC: 1 MG/DL (ref 0.5–1.4)
EST. GFR  (AFRICAN AMERICAN): >60 ML/MIN/1.73 M^2
EST. GFR  (NON AFRICAN AMERICAN): >60 ML/MIN/1.73 M^2
GLUCOSE SERPL-MCNC: 84 MG/DL (ref 70–110)
POTASSIUM SERPL-SCNC: 3.9 MMOL/L (ref 3.5–5.1)
SODIUM SERPL-SCNC: 140 MMOL/L (ref 136–145)

## 2022-02-15 PROCEDURE — 99214 OFFICE O/P EST MOD 30 MIN: CPT | Mod: S$PBB,,, | Performed by: INTERNAL MEDICINE

## 2022-02-15 PROCEDURE — 4010F ACE/ARB THERAPY RXD/TAKEN: CPT | Mod: CPTII,,, | Performed by: INTERNAL MEDICINE

## 2022-02-15 PROCEDURE — 1159F PR MEDICATION LIST DOCUMENTED IN MEDICAL RECORD: ICD-10-PCS | Mod: CPTII,,, | Performed by: INTERNAL MEDICINE

## 2022-02-15 PROCEDURE — 3008F BODY MASS INDEX DOCD: CPT | Mod: CPTII,,, | Performed by: INTERNAL MEDICINE

## 2022-02-15 PROCEDURE — 3078F DIAST BP <80 MM HG: CPT | Mod: CPTII,,, | Performed by: INTERNAL MEDICINE

## 2022-02-15 PROCEDURE — 36415 COLL VENOUS BLD VENIPUNCTURE: CPT | Mod: PN | Performed by: INTERNAL MEDICINE

## 2022-02-15 PROCEDURE — 3074F SYST BP LT 130 MM HG: CPT | Mod: CPTII,,, | Performed by: INTERNAL MEDICINE

## 2022-02-15 PROCEDURE — 99999 PR PBB SHADOW E&M-EST. PATIENT-LVL III: CPT | Mod: PBBFAC,,, | Performed by: INTERNAL MEDICINE

## 2022-02-15 PROCEDURE — 99214 PR OFFICE/OUTPT VISIT, EST, LEVL IV, 30-39 MIN: ICD-10-PCS | Mod: S$PBB,,, | Performed by: INTERNAL MEDICINE

## 2022-02-15 PROCEDURE — 4010F PR ACE/ARB THEARPY RXD/TAKEN: ICD-10-PCS | Mod: CPTII,,, | Performed by: INTERNAL MEDICINE

## 2022-02-15 PROCEDURE — 1159F MED LIST DOCD IN RCRD: CPT | Mod: CPTII,,, | Performed by: INTERNAL MEDICINE

## 2022-02-15 PROCEDURE — 80048 BASIC METABOLIC PNL TOTAL CA: CPT | Performed by: INTERNAL MEDICINE

## 2022-02-15 PROCEDURE — 99999 PR PBB SHADOW E&M-EST. PATIENT-LVL III: ICD-10-PCS | Mod: PBBFAC,,, | Performed by: INTERNAL MEDICINE

## 2022-02-15 PROCEDURE — 99213 OFFICE O/P EST LOW 20 MIN: CPT | Mod: PBBFAC,PN | Performed by: INTERNAL MEDICINE

## 2022-02-15 PROCEDURE — 3008F PR BODY MASS INDEX (BMI) DOCUMENTED: ICD-10-PCS | Mod: CPTII,,, | Performed by: INTERNAL MEDICINE

## 2022-02-15 PROCEDURE — 3078F PR MOST RECENT DIASTOLIC BLOOD PRESSURE < 80 MM HG: ICD-10-PCS | Mod: CPTII,,, | Performed by: INTERNAL MEDICINE

## 2022-02-15 PROCEDURE — 3074F PR MOST RECENT SYSTOLIC BLOOD PRESSURE < 130 MM HG: ICD-10-PCS | Mod: CPTII,,, | Performed by: INTERNAL MEDICINE

## 2022-02-15 RX ORDER — CYCLOSPORINE 0.5 MG/ML
1 EMULSION OPHTHALMIC
COMMUNITY
Start: 2021-08-27

## 2022-02-15 RX ORDER — AMLODIPINE BESYLATE 10 MG/1
10 TABLET ORAL DAILY
Qty: 90 TABLET | Refills: 1 | Status: SHIPPED | OUTPATIENT
Start: 2022-02-15 | End: 2022-07-15 | Stop reason: SDUPTHER

## 2022-02-15 RX ORDER — HYDROCHLOROTHIAZIDE 25 MG/1
25 TABLET ORAL DAILY
Qty: 90 TABLET | Refills: 1 | Status: SHIPPED | OUTPATIENT
Start: 2022-02-15 | End: 2022-07-15 | Stop reason: SDUPTHER

## 2022-02-15 RX ORDER — METFORMIN HYDROCHLORIDE 500 MG/1
TABLET ORAL
Qty: 90 TABLET | Refills: 1 | Status: SHIPPED | OUTPATIENT
Start: 2022-02-15 | End: 2022-07-15 | Stop reason: SDUPTHER

## 2022-02-15 RX ORDER — LISINOPRIL 20 MG/1
TABLET ORAL
Qty: 90 TABLET | Refills: 1 | Status: SHIPPED | OUTPATIENT
Start: 2022-02-15 | End: 2022-07-15 | Stop reason: SDUPTHER

## 2022-02-15 NOTE — PROGRESS NOTES
I sent pt a my chart message -  I reviewed your labs.  Your kidney function looked good.  It still appears you could increase your hydration. No further recommendations at this time.    Dr. UGARTE

## 2022-06-30 ENCOUNTER — LAB VISIT (OUTPATIENT)
Dept: LAB | Facility: HOSPITAL | Age: 62
End: 2022-06-30
Attending: INTERNAL MEDICINE
Payer: MEDICAID

## 2022-06-30 DIAGNOSIS — E78.5 HYPERLIPIDEMIA, UNSPECIFIED HYPERLIPIDEMIA TYPE: ICD-10-CM

## 2022-06-30 DIAGNOSIS — Z00.00 NORMAL PHYSICAL EXAM, ROUTINE: ICD-10-CM

## 2022-06-30 LAB
ALBUMIN SERPL BCP-MCNC: 4.1 G/DL (ref 3.5–5.2)
ALP SERPL-CCNC: 73 U/L (ref 55–135)
ALT SERPL W/O P-5'-P-CCNC: 11 U/L (ref 10–44)
ANION GAP SERPL CALC-SCNC: 8 MMOL/L (ref 8–16)
AST SERPL-CCNC: 16 U/L (ref 10–40)
BASOPHILS # BLD AUTO: 0.05 K/UL (ref 0–0.2)
BASOPHILS NFR BLD: 0.8 % (ref 0–1.9)
BILIRUB SERPL-MCNC: 0.2 MG/DL (ref 0.1–1)
BUN SERPL-MCNC: 13 MG/DL (ref 8–23)
CALCIUM SERPL-MCNC: 9.9 MG/DL (ref 8.7–10.5)
CHLORIDE SERPL-SCNC: 108 MMOL/L (ref 95–110)
CHOLEST SERPL-MCNC: 250 MG/DL (ref 120–199)
CHOLEST/HDLC SERPL: 3.6 {RATIO} (ref 2–5)
CO2 SERPL-SCNC: 26 MMOL/L (ref 23–29)
CREAT SERPL-MCNC: 0.9 MG/DL (ref 0.5–1.4)
DIFFERENTIAL METHOD: NORMAL
EOSINOPHIL # BLD AUTO: 0.3 K/UL (ref 0–0.5)
EOSINOPHIL NFR BLD: 4.9 % (ref 0–8)
ERYTHROCYTE [DISTWIDTH] IN BLOOD BY AUTOMATED COUNT: 12.2 % (ref 11.5–14.5)
EST. GFR  (AFRICAN AMERICAN): >60 ML/MIN/1.73 M^2
EST. GFR  (NON AFRICAN AMERICAN): >60 ML/MIN/1.73 M^2
ESTIMATED AVG GLUCOSE: 100 MG/DL (ref 68–131)
GLUCOSE SERPL-MCNC: 101 MG/DL (ref 70–110)
HBA1C MFR BLD: 5.1 % (ref 4–5.6)
HCT VFR BLD AUTO: 39.8 % (ref 37–48.5)
HDLC SERPL-MCNC: 70 MG/DL (ref 40–75)
HDLC SERPL: 28 % (ref 20–50)
HGB BLD-MCNC: 13 G/DL (ref 12–16)
IMM GRANULOCYTES # BLD AUTO: 0.01 K/UL (ref 0–0.04)
IMM GRANULOCYTES NFR BLD AUTO: 0.2 % (ref 0–0.5)
LDLC SERPL CALC-MCNC: 155.8 MG/DL (ref 63–159)
LYMPHOCYTES # BLD AUTO: 2.4 K/UL (ref 1–4.8)
LYMPHOCYTES NFR BLD: 41.1 % (ref 18–48)
MCH RBC QN AUTO: 28.3 PG (ref 27–31)
MCHC RBC AUTO-ENTMCNC: 32.7 G/DL (ref 32–36)
MCV RBC AUTO: 87 FL (ref 82–98)
MONOCYTES # BLD AUTO: 0.7 K/UL (ref 0.3–1)
MONOCYTES NFR BLD: 11.1 % (ref 4–15)
NEUTROPHILS # BLD AUTO: 2.5 K/UL (ref 1.8–7.7)
NEUTROPHILS NFR BLD: 41.9 % (ref 38–73)
NONHDLC SERPL-MCNC: 180 MG/DL
NRBC BLD-RTO: 0 /100 WBC
PLATELET # BLD AUTO: 255 K/UL (ref 150–450)
PMV BLD AUTO: 12.4 FL (ref 9.2–12.9)
POTASSIUM SERPL-SCNC: 4.3 MMOL/L (ref 3.5–5.1)
PROT SERPL-MCNC: 7.3 G/DL (ref 6–8.4)
RBC # BLD AUTO: 4.6 M/UL (ref 4–5.4)
SODIUM SERPL-SCNC: 142 MMOL/L (ref 136–145)
T4 FREE SERPL-MCNC: 1.01 NG/DL (ref 0.71–1.51)
TRIGL SERPL-MCNC: 121 MG/DL (ref 30–150)
TSH SERPL DL<=0.005 MIU/L-ACNC: 2.75 UIU/ML (ref 0.4–4)
WBC # BLD AUTO: 5.93 K/UL (ref 3.9–12.7)

## 2022-06-30 PROCEDURE — 85025 COMPLETE CBC W/AUTO DIFF WBC: CPT | Performed by: INTERNAL MEDICINE

## 2022-06-30 PROCEDURE — 80061 LIPID PANEL: CPT | Performed by: INTERNAL MEDICINE

## 2022-06-30 PROCEDURE — 84439 ASSAY OF FREE THYROXINE: CPT | Performed by: INTERNAL MEDICINE

## 2022-06-30 PROCEDURE — 83036 HEMOGLOBIN GLYCOSYLATED A1C: CPT | Performed by: INTERNAL MEDICINE

## 2022-06-30 PROCEDURE — 84443 ASSAY THYROID STIM HORMONE: CPT | Performed by: INTERNAL MEDICINE

## 2022-06-30 PROCEDURE — 80053 COMPREHEN METABOLIC PANEL: CPT | Performed by: INTERNAL MEDICINE

## 2022-06-30 PROCEDURE — 36415 COLL VENOUS BLD VENIPUNCTURE: CPT | Mod: PN | Performed by: INTERNAL MEDICINE

## 2022-07-14 NOTE — PROGRESS NOTES
[facility] Note    Chief Complaint      Chief Complaint   Patient presents with    Annual Exam       History of Present Illness      Cary Claudio is a 62 y.o.  HTN, Anemia, Duodenal ulcer presents to fu well visit. Last visit - 2/15/22    Syncope - Pt [place] and was standing for a prolonged period.  EMS checked her and BP was low. She felt she was going to pass out and went to sit down and lost consciousness for 30 sec.  She recalls not feeling that well earlier that AM and had a HA. No further episodes. She did not go to ER because she felt better right away.  She had h/o Prev Vasovagal response when donating blood > 20 yrs ago. Note she she had a prev ASD repair '98.  Will recheck Echo, check EKG, and refer to Cards. She will alert MD for any further episodes/cocerns.  Could consider holter and stress test given fam hx and RF.     HLD -     HDL 70 - 6/30/22.The 10-year ASCVD risk score (Risk of having a cardiovascular event within 10 yrs) is: 6.5%. We could consider starting her on a statin.  I would like to start her on Atorvastatin 20 mg daily. There are potential Side effects including muscle pain,  muscle weakness, and hyperglycemia. Am referring to Cards for second opinion given recent syncopal episode and [facility] h/o CAD.  Repeat FLP 6 mos.      HTN - Pt controlled on Norvasc 10 mg/d, HCTZ 25 mg, lisinopril 20 mg/d. Rare swelling.  Rec low sodium diet. She is doing well on this.  Echo -5/19/20 -  Concentric left ventricular remodeling, EF - 65%. Grade I (mild) left ventricular diastolic dysfunction consistent with impaired relaxation. Mild TR. Mildly reduced right ventricular systolic function. Mild Ventricular Enlargement. Note - potential ASD repair - inter-atrial septum does appear thickened and hyperechoic which could represent repair. No flow by color doppler. No evidence of significant RV dilation or dysfunction.  BUN/Cr  -13/0.9.      H/o Duodenal ulcer - No longer on Omeprazole. Rec Avoid NSAIDS.      Osteopenia - Pt on Calcium and Vit D.  She exercises.      Overweight  BMI - 25.81- down 2 lb. Pt walks most days. She plays tennis intermittently. She lost 25 lb prev on Metformin.   Pt was started on Metformin 1  per OB for wt loss. She felt better on the Metformin.  Ha1c - 5.1.      HCM - Flu - 2/15/22;  Tdap - 5/19/20;  PCV 13 - none;  PVX 23 - none;  Shingrix - #1 - 6/28/21 and #2 - 10/27/21; Zostavax - 10/8/14;  COVID - 19 Vaccine (J&J) - 3/10/21; #2 - declines;  MGM -7/20/21- repeat 1 yr;   Left breast U/S 6/19/20 - benign simple cyst - repeat MGM in 1 yr   - has orderDEXA - 6/16/20 - Osteopenia- repeat 3 yrs;  PAP - 6/1/20 - neg; pt s/p partial Hys; Hep C Screen -5/19/20 neg;  HIV Screen - 5/19/20- neg; C-scope - 11/21/14 - repeat 10 yrs; Prev PCP - Dr. Rutledge; Ophtho - Dr. Martinez; Ob/GYN - Dr. Borges; well visit - 6/28/21      Patient Care Team:  Nicole Rcih MD as PCP - General (Internal Medicine)  Florecita Cochran MA as Care Coordinator     Health Maintenance:  Immunization History   Administered Date(s) Administered    COVID-19, vector-nr, rS-Ad26, PF (Kerecis) 03/10/2021    Influenza - Quadrivalent 10/08/2014    Influenza - Quadrivalent - PF *Preferred* (6 months and older) 02/15/2022    Tdap 05/19/2020    Zoster 10/08/2014    Zoster Recombinant 06/28/2021, 10/27/2021      Health Maintenance   Topic Date Due    DEXA Scan  06/16/2022    Mammogram  07/20/2022    Lipid Panel  06/30/2023    TETANUS VACCINE  05/19/2030    Hepatitis C Screening  Completed        Past Medical History:  Past Medical History:   Diagnosis Date    Anemia     Cardiac anomaly, congenital     pt had a repair for ?ASD '98 at [facility] with Dr. Aguilar    Congenital heart anomaly     Fibroids     uterine     History of migraine     Hyperlipidemia     Hypertension     Insomnia     Nicotine dependence in remission     PUD  (peptic ulcer disease)        Past Surgical History:   has a past surgical history that includes repair of congenital heart disease () and Hysterectomy ().    Family History:  family history includes Diabetes in her brother; Heart attack (age of onset: 80) in her father; Heart attacks under age 50 (age of onset: 42) in her maternal grandfather; Heart disease (age of onset: 34) in her mother; Heart disease (age of onset: 70) in her father; Hypertension in her mother; Rheum arthritis in her sister.     Social History:  Social History     Tobacco Use    Smoking status: Former Smoker     Types: Cigarettes     Quit date: 2004     Years since quittin.0    Smokeless tobacco: Never Used    Tobacco comment: a few cig on occasion x 2 yrs   Substance Use Topics    Alcohol use: Yes     Alcohol/week: 5.0 standard drinks     Types: 5 Glasses of wine per week    Drug use: No       Review of Systems   Constitutional: Negative for chills and fever.   HENT: Negative for nasal congestion and sore throat.    Eyes: Negative for visual disturbance.   Respiratory: Negative for cough.    Gastrointestinal: Positive for reflux. Negative for abdominal pain, blood in stool, constipation, diarrhea, nausea and vomiting.        Rare. Rec reflux prec.    Endocrine: Negative for cold intolerance, heat intolerance and polydipsia.   Genitourinary: Negative for bladder incontinence, dysuria and frequency.   Musculoskeletal: Negative for arthralgias and myalgias.   Neurological: Positive for syncope and headaches. Negative for dizziness.        See HPI - episode in Mar.    Psychiatric/Behavioral: Negative for dysphoric mood. The patient is not nervous/anxious.         Medications:    Current Outpatient Medications:     RESTASIS 0.05 % ophthalmic emulsion, , Disp: , Rfl:     triamcinolone (KENALOG) 0.5 % ointment, Apply topically 2 (two) times daily. Apply to affected area BID x 2 wks prn rash, Disp: 15 g, Rfl: 0    amLODIPine  "(NORVASC) 10 MG tablet, Take 1 tablet (10 mg total) by mouth once daily., Disp: 90 tablet, Rfl: 1    flu vacc [id number] 6mos up,PF, (FLUARIX QUAD [id number], PF,) 60 mcg (15 mcg x 4)/0.5 mL Syrg, Inject in the muscle (Patient not taking: Reported on 7/15/2022), Disp: 0.5 mL, Rfl: 0    hydroCHLOROthiazide (HYDRODIURIL) 25 MG tablet, Take 1 tablet (25 mg total) by mouth once daily., Disp: 90 tablet, Rfl: 1    lisinopriL (PRINIVIL,ZESTRIL) 20 MG tablet, 1 tab daily, Disp: 90 tablet, Rfl: 1    metFORMIN (GLUCOPHAGE) 500 MG tablet, 1 Po QD, Disp: 90 tablet, Rfl: 1     Allergies:  Review of patient's allergies indicates:   Allergen Reactions    Compazine [prochlorperazine] Swelling     Tongue swelling    Pcn [penicillins] Swelling     Tongue swelling       Physical Exam      Vital Signs  Temp: 98 °F (36.7 °C)  Pulse: 93  Resp: 18  SpO2: 99 %  BP: 125/72  BP Location: Left arm  Patient Position: Sitting  Pain Score: 0-No pain  Height and Weight  Height: 5' 4" (162.6 cm)  Weight: 68.2 kg (150 lb 5.7 oz)  BSA (Calculated - sq m): 1.75 sq meters  BMI (Calculated): 25.8  Weight in (lb) to have BMI = 25: 145.3      Patient Position: Sitting      Physical Exam  Vitals reviewed.   Constitutional:       General: She is not in acute distress.     Appearance: Normal appearance. She is not ill-appearing, toxic-appearing or diaphoretic.   HENT:      Head: Normocephalic and atraumatic.      Right Ear: Tympanic membrane normal.      Left Ear: Tympanic membrane normal.   Eyes:      Extraocular Movements: Extraocular movements intact.      Conjunctiva/sclera: Conjunctivae normal.      Pupils: Pupils are equal, round, and reactive to light.   Neck:      Vascular: No carotid bruit.   Cardiovascular:      Rate and Rhythm: Normal rate and regular rhythm.      Pulses: Normal pulses.      Heart sounds: Normal heart sounds. No murmur heard.  Pulmonary:      Effort: Pulmonary effort is normal. No respiratory distress.      Breath sounds: " Normal breath sounds.   Abdominal:      General: Bowel sounds are normal. There is no distension.      Palpations: Abdomen is soft.      Tenderness: There is no abdominal tenderness. There is no guarding or rebound.   Musculoskeletal:         General: Normal range of motion.      Cervical back: Neck supple.   Skin:     General: Skin is warm and dry.   Neurological:      General: No focal deficit present.      Mental Status: She is alert and oriented to person, place, and time.   Psychiatric:         Mood and Affect: Mood normal.         Behavior: Behavior normal.          Laboratory:  CBC:  Recent Labs   Lab 05/19/20  1125 06/30/22  0905   WBC 8.38 5.93   RBC 4.95 4.60   Hemoglobin 14.2 13.0   Hematocrit 46.1 39.8   Platelets 220 255   MCV 93 87   MCH 28.7 28.3   MCHC 30.8 L 32.7       CMP:  Recent Labs   Lab 05/19/20  1125 06/17/20  1031 06/28/21  0814 02/15/22  0958 06/30/22  0905   Glucose 98   < > 91   < > 101   Calcium 9.7   < > 10.0   < > 9.9   Albumin 4.5  --  4.2  --  4.1   Total Protein 7.9  --  7.6  --  7.3   Sodium 143   < > 143   < > 142   Potassium 3.9   < > 3.7   < > 4.3   CO2 26   < > 25   < > 26   Chloride 106   < > 105   < > 108   BUN 21   < > 20   < > 13   Creatinine 1.1   < > 1.0   < > 0.9   Alkaline Phosphatase 91  --  74  --  73   ALT 14  --  12  --  11   AST 22  --  17  --  16   Total Bilirubin 0.3  --  0.4  --  0.2    < > = values in this interval not displayed.       LIPIDS:  Recent Labs   Lab 05/19/20  1125 06/28/21  0814 10/27/21  0845 06/30/22  0905   TSH 2.376 2.527  --  2.747   HDL 78 H 66 75 70   Cholesterol 253 H 249 H 223 H 250 H   Triglycerides 67 98 162 H 121   LDL Cholesterol 161.6 H 163.4 H 115.6 155.8   HDL/Cholesterol Ratio 30.8 26.5 33.6 28.0   Non-HDL Cholesterol 175 183 148 180   Total Cholesterol/HDL Ratio 3.2 3.8 3.0 3.6       TSH:  Recent Labs   Lab 05/19/20  1125 06/28/21  0814 06/30/22  0905   TSH 2.376 2.527 2.747       A1C:  Recent Labs   Lab 06/28/21  0814  06/30/22  0905   Hemoglobin A1C 5.1 5.1         Other:   Recent Labs   Lab 06/01/20  1454   Testosterone, Total 20   Estradiol <10 A     Recent Labs   Lab 05/19/20  1125   Hepatitis C Ab Negative       Assessment/Plan     Cary Claudio is a 62 y.o.female with:    Normal physical exam, routine  -     IN OFFICE EKG 12-LEAD (to [place])  - Performed today.  Reviewed recent  Basic labs.     Syncope, unspecified syncope type  -     Ambulatory referral/consult to Cardiology; Future; Expected date: 07/22/2022  -     Echo; Future  - Will recheck Echo, check EKG, and refer to Cards. She will alert MD for any further episodes/concerns.  Could consider holter and stress test given fam hx and RF.     Hypertension, unspecified type  -     lisinopriL (PRINIVIL,ZESTRIL) 20 MG tablet; 1 tab daily  Dispense: 90 tablet; Refill: 1  -     hydroCHLOROthiazide (HYDRODIURIL) 25 MG tablet; Take 1 tablet (25 mg total) by mouth once daily.  Dispense: 90 tablet; Refill: 1  -     amLODIPine (NORVASC) 10 MG tablet; Take 1 tablet (10 mg total) by mouth once daily.  Dispense: 90 tablet; Refill: 1  - Controlled.  Cont current.     Hyperlipidemia, unspecified hyperlipidemia type  - Uncontrolled.  Rec consider statin.  Refer to Cards for 2nd opinion.     Osteopenia, unspecified location  - Pt on Calcium, Vit D, exercise.     Metabolic syndrome  -     metFORMIN (GLUCOPHAGE) 500 MG tablet; 1 Po QD  Dispense: 90 tablet; Refill: 1      Chronic conditions status updated as per HPI.  Other than changes above, cont current medications and maintain follow up with specialists.    Follow up in about 4 months (around 11/15/2022).      Nicole Rich MD  [facility]

## 2022-07-14 NOTE — PROGRESS NOTES
Please inform pt-  I reviewed your labs.  Your thyroid functions are normal.  Your cholesterol was high again. The 10-year ASCVD risk score (Risk of having a cardiovascular event within 10 yrs) is: 6.7%.  We could consider starting you on a statin.  I would like to start you on Atorvastatin 20 mg daily. There are potential Side effects including muscle pain,  muscle weakness, and hyperglycemia.  Please let me know if you are willing to start this so I can send it to the pharm for you . You should call with any concerns.  Will cont to monitor. I recommend we repeat your cholesterol in 6 mos.  Your kidney function and liver functions looked good.  Your Ha1c was normal at 5.1.  You are not anemic. No further recommendations at this time.    Dr. UGARTE

## 2022-07-15 ENCOUNTER — OFFICE VISIT (OUTPATIENT)
Dept: PRIMARY CARE CLINIC | Facility: CLINIC | Age: 62
End: 2022-07-15
Payer: MEDICAID

## 2022-07-15 VITALS
DIASTOLIC BLOOD PRESSURE: 72 MMHG | HEIGHT: 64 IN | BODY MASS INDEX: 25.67 KG/M2 | RESPIRATION RATE: 18 BRPM | WEIGHT: 150.38 LBS | SYSTOLIC BLOOD PRESSURE: 125 MMHG | OXYGEN SATURATION: 99 % | TEMPERATURE: 98 F | HEART RATE: 93 BPM

## 2022-07-15 DIAGNOSIS — M85.80 OSTEOPENIA, UNSPECIFIED LOCATION: ICD-10-CM

## 2022-07-15 DIAGNOSIS — I10 HYPERTENSION, UNSPECIFIED TYPE: ICD-10-CM

## 2022-07-15 DIAGNOSIS — E88.810 METABOLIC SYNDROME: ICD-10-CM

## 2022-07-15 DIAGNOSIS — R55 SYNCOPE, UNSPECIFIED SYNCOPE TYPE: ICD-10-CM

## 2022-07-15 DIAGNOSIS — Z00.00 NORMAL PHYSICAL EXAM, ROUTINE: Primary | ICD-10-CM

## 2022-07-15 DIAGNOSIS — E78.5 HYPERLIPIDEMIA, UNSPECIFIED HYPERLIPIDEMIA TYPE: ICD-10-CM

## 2022-07-15 PROCEDURE — 3074F SYST BP LT 130 MM HG: CPT | Mod: CPTII,,, | Performed by: INTERNAL MEDICINE

## 2022-07-15 PROCEDURE — 93010 ELECTROCARDIOGRAM REPORT: CPT | Mod: S$PBB,,, | Performed by: INTERNAL MEDICINE

## 2022-07-15 PROCEDURE — 99999 PR PBB SHADOW E&M-EST. PATIENT-LVL IV: ICD-10-PCS | Mod: PBBFAC,,, | Performed by: INTERNAL MEDICINE

## 2022-07-15 PROCEDURE — 3008F PR BODY MASS INDEX (BMI) DOCUMENTED: ICD-10-PCS | Mod: CPTII,,, | Performed by: INTERNAL MEDICINE

## 2022-07-15 PROCEDURE — 4010F PR ACE/ARB THEARPY RXD/TAKEN: ICD-10-PCS | Mod: CPTII,,, | Performed by: INTERNAL MEDICINE

## 2022-07-15 PROCEDURE — 99396 PREV VISIT EST AGE 40-64: CPT | Mod: S$PBB,,, | Performed by: INTERNAL MEDICINE

## 2022-07-15 PROCEDURE — 1159F PR MEDICATION LIST DOCUMENTED IN MEDICAL RECORD: ICD-10-PCS | Mod: CPTII,,, | Performed by: INTERNAL MEDICINE

## 2022-07-15 PROCEDURE — 3078F PR MOST RECENT DIASTOLIC BLOOD PRESSURE < 80 MM HG: ICD-10-PCS | Mod: CPTII,,, | Performed by: INTERNAL MEDICINE

## 2022-07-15 PROCEDURE — 3044F HG A1C LEVEL LT 7.0%: CPT | Mod: CPTII,,, | Performed by: INTERNAL MEDICINE

## 2022-07-15 PROCEDURE — 3074F PR MOST RECENT SYSTOLIC BLOOD PRESSURE < 130 MM HG: ICD-10-PCS | Mod: CPTII,,, | Performed by: INTERNAL MEDICINE

## 2022-07-15 PROCEDURE — 99214 OFFICE O/P EST MOD 30 MIN: CPT | Mod: PBBFAC,PN | Performed by: INTERNAL MEDICINE

## 2022-07-15 PROCEDURE — 3044F PR MOST RECENT HEMOGLOBIN A1C LEVEL <7.0%: ICD-10-PCS | Mod: CPTII,,, | Performed by: INTERNAL MEDICINE

## 2022-07-15 PROCEDURE — 99999 PR PBB SHADOW E&M-EST. PATIENT-LVL IV: CPT | Mod: PBBFAC,,, | Performed by: INTERNAL MEDICINE

## 2022-07-15 PROCEDURE — 3078F DIAST BP <80 MM HG: CPT | Mod: CPTII,,, | Performed by: INTERNAL MEDICINE

## 2022-07-15 PROCEDURE — 1159F MED LIST DOCD IN RCRD: CPT | Mod: CPTII,,, | Performed by: INTERNAL MEDICINE

## 2022-07-15 PROCEDURE — 1160F PR REVIEW ALL MEDS BY PRESCRIBER/CLIN PHARMACIST DOCUMENTED: ICD-10-PCS | Mod: CPTII,,, | Performed by: INTERNAL MEDICINE

## 2022-07-15 PROCEDURE — 99396 PR PREVENTIVE VISIT,EST,40-64: ICD-10-PCS | Mod: S$PBB,,, | Performed by: INTERNAL MEDICINE

## 2022-07-15 PROCEDURE — 4010F ACE/ARB THERAPY RXD/TAKEN: CPT | Mod: CPTII,,, | Performed by: INTERNAL MEDICINE

## 2022-07-15 PROCEDURE — 1160F RVW MEDS BY RX/DR IN RCRD: CPT | Mod: CPTII,,, | Performed by: INTERNAL MEDICINE

## 2022-07-15 PROCEDURE — 93005 ELECTROCARDIOGRAM TRACING: CPT | Mod: PBBFAC,PN | Performed by: INTERNAL MEDICINE

## 2022-07-15 PROCEDURE — 93010 EKG 12-LEAD: ICD-10-PCS | Mod: S$PBB,,, | Performed by: INTERNAL MEDICINE

## 2022-07-15 PROCEDURE — 3008F BODY MASS INDEX DOCD: CPT | Mod: CPTII,,, | Performed by: INTERNAL MEDICINE

## 2022-07-15 RX ORDER — METFORMIN HYDROCHLORIDE 500 MG/1
TABLET ORAL
Qty: 90 TABLET | Refills: 1 | Status: SHIPPED | OUTPATIENT
Start: 2022-07-15 | End: 2023-01-30

## 2022-07-15 RX ORDER — HYDROCHLOROTHIAZIDE 25 MG/1
25 TABLET ORAL DAILY
Qty: 90 TABLET | Refills: 1 | Status: SHIPPED | OUTPATIENT
Start: 2022-07-15 | End: 2023-04-10 | Stop reason: SDUPTHER

## 2022-07-15 RX ORDER — AMLODIPINE BESYLATE 10 MG/1
10 TABLET ORAL DAILY
Qty: 90 TABLET | Refills: 1 | Status: SHIPPED | OUTPATIENT
Start: 2022-07-15 | End: 2023-04-10 | Stop reason: SDUPTHER

## 2022-07-15 RX ORDER — LISINOPRIL 20 MG/1
TABLET ORAL
Qty: 90 TABLET | Refills: 1 | Status: SHIPPED | OUTPATIENT
Start: 2022-07-15 | End: 2023-04-10 | Stop reason: SDUPTHER

## 2022-08-05 ENCOUNTER — HOSPITAL ENCOUNTER (OUTPATIENT)
Dept: CARDIOLOGY | Facility: HOSPITAL | Age: 62
Discharge: HOME | End: 2022-08-05
Attending: INTERNAL MEDICINE
Payer: MEDICAID

## 2022-08-05 VITALS
WEIGHT: 150 LBS | SYSTOLIC BLOOD PRESSURE: 170 MMHG | BODY MASS INDEX: 25.61 KG/M2 | HEART RATE: 80 BPM | DIASTOLIC BLOOD PRESSURE: 96 MMHG | HEIGHT: 64 IN

## 2022-08-05 DIAGNOSIS — R55 SYNCOPE, UNSPECIFIED SYNCOPE TYPE: ICD-10-CM

## 2022-08-05 LAB
ASCENDING AORTA: 3.06 CM
AV INDEX (PROSTH): 1.04
AV MEAN GRADIENT: 4 MMHG
AV PEAK GRADIENT: 7 MMHG
AV VALVE AREA: 3.19 CM2
AV VELOCITY RATIO: 0.88
BSA FOR ECHO PROCEDURE: 1.75 M2
CV ECHO LV RWT: 0.4 CM
DOP CALC AO PEAK VEL: 1.3 M/S
DOP CALC AO VTI: 23.32 CM
DOP CALC LVOT AREA: 3.1 CM2
DOP CALC LVOT DIAMETER: 1.98 CM
DOP CALC LVOT PEAK VEL: 1.15 M/S
DOP CALC LVOT STROKE VOLUME: 74.38 CM3
DOP CALCLVOT PEAK VEL VTI: 24.17 CM
E WAVE DECELERATION TIME: 189.31 MSEC
E/A RATIO: 0.56
E/E' RATIO: 7.86 M/S
ECHO LV POSTERIOR WALL: 0.73 CM (ref 0.6–1.1)
EJECTION FRACTION: 68 %
FRACTIONAL SHORTENING: 57 % (ref 28–44)
INTERVENTRICULAR SEPTUM: 0.85 CM (ref 0.6–1.1)
IVRT: 131.3 MSEC
LA MAJOR: 4.62 CM
LA MINOR: 4.33 CM
LA WIDTH: 3.73 CM
LEFT ATRIUM SIZE: 3 CM
LEFT ATRIUM VOLUME INDEX MOD: 23.7 ML/M2
LEFT ATRIUM VOLUME INDEX: 24.6 ML/M2
LEFT ATRIUM VOLUME MOD: 41.05 CM3
LEFT ATRIUM VOLUME: 42.52 CM3
LEFT INTERNAL DIMENSION IN SYSTOLE: 1.6 CM (ref 2.1–4)
LEFT VENTRICLE DIASTOLIC VOLUME INDEX: 33.35 ML/M2
LEFT VENTRICLE DIASTOLIC VOLUME: 57.69 ML
LEFT VENTRICLE MASS INDEX: 47 G/M2
LEFT VENTRICLE SYSTOLIC VOLUME INDEX: 4.1 ML/M2
LEFT VENTRICLE SYSTOLIC VOLUME: 7.16 ML
LEFT VENTRICULAR INTERNAL DIMENSION IN DIASTOLE: 3.69 CM (ref 3.5–6)
LEFT VENTRICULAR MASS: 80.57 G
LV LATERAL E/E' RATIO: 6.11 M/S
LV SEPTAL E/E' RATIO: 11 M/S
MV PEAK A VEL: 0.99 M/S
MV PEAK E VEL: 0.55 M/S
MV STENOSIS PRESSURE HALF TIME: 54.9 MS
MV VALVE AREA P 1/2 METHOD: 4.01 CM2
PISA TR MAX VEL: 2.65 M/S
PULM VEIN S/D RATIO: 2.12
PV PEAK D VEL: 0.42 M/S
PV PEAK S VEL: 0.89 M/S
RA MAJOR: 4.36 CM
RA WIDTH: 3.25 CM
RIGHT VENTRICULAR END-DIASTOLIC DIMENSION: 2.49 CM
RV TISSUE DOPPLER FREE WALL SYSTOLIC VELOCITY 1 (APICAL 4 CHAMBER VIEW): 11 CM/S
SINUS: 2.97 CM
STJ: 2.56 CM
TDI LATERAL: 0.09 M/S
TDI SEPTAL: 0.05 M/S
TDI: 0.07 M/S
TR MAX PG: 28 MMHG

## 2022-08-05 PROCEDURE — 93306 TTE W/DOPPLER COMPLETE: CPT

## 2022-08-05 PROCEDURE — 93306 TTE W/DOPPLER COMPLETE: CPT | Mod: 26,,, | Performed by: INTERNAL MEDICINE

## 2022-08-05 PROCEDURE — 93306 ECHO (CUPID ONLY): ICD-10-PCS | Mod: 26,,, | Performed by: INTERNAL MEDICINE

## 2022-08-09 NOTE — PROGRESS NOTES
Called pt. No answer. Left message to return call. Please inform pt -  I reviewed your echocardiogram which was essentially normal. The Squeeze of your heart and the way it fills was normal. You had trace tricuspid regurgitation and trace pulmonic regurgitation.  PAP > 28mmHG.  No source for your episode of syncope    Dr. UGARTE

## 2022-08-24 ENCOUNTER — PATIENT MESSAGE (OUTPATIENT)
Dept: ADMINISTRATIVE | Facility: HOSPITAL | Age: 62
End: 2022-08-24
Payer: MEDICAID

## 2022-10-10 ENCOUNTER — PATIENT MESSAGE (OUTPATIENT)
Dept: ADMINISTRATIVE | Facility: HOSPITAL | Age: 62
End: 2022-10-10
Payer: MEDICAID

## 2023-01-30 ENCOUNTER — PATIENT MESSAGE (OUTPATIENT)
Dept: PRIMARY CARE CLINIC | Facility: CLINIC | Age: 63
End: 2023-01-30

## 2023-01-30 DIAGNOSIS — E88.810 METABOLIC SYNDROME: ICD-10-CM

## 2023-01-30 RX ORDER — METFORMIN HYDROCHLORIDE 500 MG/1
TABLET ORAL
Qty: 90 TABLET | Refills: 0 | Status: SHIPPED | OUTPATIENT
Start: 2023-01-30 | End: 2023-04-10 | Stop reason: SDUPTHER

## 2023-01-30 NOTE — TELEPHONE ENCOUNTER
Refill Routing Note   Medication(s) are not appropriate for processing by [facility] for the following reason(s):       Requires lab    [facility] action(s):  Defer   Care gaps identified: Yes                      Appointments  past 12m or future 3m with PCP    Date Provider   Last Visit   7/15/2022 Nicole Rcih MD   Next Visit   Visit date not found Nicole Rich MD   ED visits in past 90 days: 0        Note composed:12:00 PM 01/30/2023

## 2023-01-30 NOTE — TELEPHONE ENCOUNTER
Care Due:                  Date            Visit Type   Department     Provider  --------------------------------------------------------------------------------                                EP -                              PRIMARY      [facility]  Last Visit: 07-      CARE ([facility])   CARE           Nicole Rich  Next Visit: None Scheduled  None         None Found                                                            Last  Test          Frequency    Reason                     Performed    Due Date  --------------------------------------------------------------------------------    HBA1C.......  6 months...  metFORMIN................  06- 12-    [facility] Embedded Care Gaps. Reference number: [id number]. 1/30/2023   9:36:24 AM CST

## 2023-01-31 ENCOUNTER — PATIENT MESSAGE (OUTPATIENT)
Dept: PRIMARY CARE CLINIC | Facility: CLINIC | Age: 63
End: 2023-01-31

## 2023-02-09 DIAGNOSIS — Z00.00 ENCOUNTER FOR MEDICARE ANNUAL WELLNESS EXAM: ICD-10-CM

## 2023-04-09 NOTE — PROGRESS NOTES
[facility] Note    Chief Complaint      Chief Complaint   Patient presents with    Annual Exam       History of Present Illness      Cary Claudio is a 63 y.o.  HTN, Anemia, Duodenal ulcer presents to fu well visit. Last visit - 7/15/22     Syncope - [place] and was standing for a prolonged period.  EMS checked her and BP was low. She felt she was going to pass out and went to sit down and lost consciousness for 30 sec.  She recalls not feeling that well earlier that AM and had a HA. No further episodes. She did not go to ER because she felt better right away.  She had h/o Prev Vasovagal response when donating blood > 20 yrs ago. Note she she had a prev ASD repair '98.  Echo - 8/5/22- EF 68%, Trace TR, Trace pulmonic Regurg.  EKG - 7/15/22 - NSR.  Referred to [facility] but she never went. She would like a new referral. She will alert MD for any further episodes/cocerns. Could consider holter and stress test given fam hx and RF.      HLD -     HDL 70 - 6/30/22.The 10-year ASCVD risk score (Risk of having a cardiovascular event within 10 yrs) is: 6.5%. We could consider starting her on a statin. I would like to start her on Atorvastatin 20 mg daily. There are potential Side effects including muscle pain,  muscle weakness, and hyperglycemia. I referred her to [facility] for second opinion given recent syncopal episode and fam h/o CAD.  Repeat FLP.      HTN - Pt uncontrolled on Norvasc 10 mg/d, HCTZ 25 mg, lisinopril 20 mg/d. Rare swelling.  Rec low sodium diet. She is doing well on this.  Echo -5/19/20 -  Concentric left ventricular remodeling, EF - 65%. Grade I (mild) left ventricular diastolic dysfunction consistent with impaired relaxation. Mild TR. Mildly reduced right ventricular systolic function. Mild Ventricular Enlargement. Note - potential ASD repair - inter-atrial septum does appear thickened and hyperechoic which could represent repair.  No flow by color doppler. No evidence of significant RV dilation or dysfunction.    Echo - 8/5/22- EF 68%, Trace TR, Trace pulmonic Regurg.  BUN/Cr -13/0.9. Will inc Lisinopril to 30 mg/d. She reports home BP - 145. She will message with a  BP log in 2 wks.     H/o Duodenal ulcer - No longer on Omeprazole. Rec Avoid NSAIDS. ok for tylenol prn.     Osteopenia - Pt on Calcium and Vit D.  She exercises.      Overweight  BMI - 26.79. Up 6 lb since last visit. Pt walks most days. She plays tennis intermittently. She lost 25 lb prev on Metformin.   Pt was started on Metformin 1  per OB for wt loss.  Ha1c - 5.1. Repeat FLP. Pt will decide if she wished to cont the metformin.      HCM - Flu - 2/15/22- defers;  Tdap - 5/19/20;  PCV 20 - admin 4/10/23; PCV 13 - none;  PVX 23 - none;  Shingrix - #1 - 6/28/21 and #2 - 10/27/21; Zostavax - 10/8/14;  COVID - 19 Vaccine (J&J) - 3/10/21; #2 - declines;  MGM -7/20/21- repeat 1 yr;   Left breast U/S 6/19/20 - benign simple cyst - repeat MGM in 1 yr   - has order; DEXA - 6/16/20 - Osteopenia- repeat 3 yrs;  PAP - 6/1/20 - neg; pt s/p partial Hys; Hep C Screen -5/19/20 neg;  HIV Screen - 5/19/20- neg; C-scope - 11/21/14 - repeat 10 yrs; Prev PCP - Dr. Rutledge; Ophtho - Dr. Martinez; Ob/GYN - Dr. Borges; well visit - 6/28/21    Patient Care Team:  Nicole Rich MD as PCP - General (Internal Medicine)  Florecita Cochran MA as Care Coordinator     Health Maintenance:  Immunization History   Administered Date(s) Administered    COVID-19, vector-nr, rS-Ad26, PF ([employer]) 03/10/2021    Influenza - Quadrivalent 10/08/2014    Influenza - Quadrivalent - PF *Preferred* (6 months and older) 02/15/2022    Pneumococcal Conjugate - 20 Valent 04/10/2023    Tdap 05/19/2020    Zoster 10/08/2014    Zoster Recombinant 06/28/2021, 10/27/2021      Health Maintenance   Topic Date Due    DEXA Scan  06/16/2022    Mammogram  07/20/2022    Lipid Panel  06/30/2023    TETANUS VACCINE  05/19/2030     Hepatitis C Screening  Completed        Past Medical History:  Past Medical History:   Diagnosis Date    Anemia     Cardiac anomaly, congenital     pt had a repair for ?ASD '[facility] at [facility] with Dr. Aguilar    Congenital heart anomaly     Fibroids     uterine     History of migraine     Hyperlipidemia     Hypertension     Insomnia     Nicotine dependence in remission     PUD (peptic ulcer disease)        Past Surgical History:   has a past surgical history that includes repair of congenital heart disease () and Hysterectomy ().    Family History:  family history includes Diabetes in her brother; Heart attack (age of onset: 80) in her father; Heart attacks under age 50 (age of onset: 42) in her maternal grandfather; Heart disease (age of onset: 34) in her mother; Heart disease (age of onset: 70) in her father; Hypertension in her mother; Lung cancer in her brother; Rheum arthritis in her sister.     Social History:  Social History     Tobacco Use    Smoking status: Former     Types: Cigarettes     Quit date: 2004     Years since quittin.7    Smokeless tobacco: Never    Tobacco comments:     a few cig on occasion x 2 yrs   Substance Use Topics    Alcohol use: Not Currently     Comment: rare    Drug use: No       Review of Systems   Constitutional:  Negative for chills and fever.   HENT:  Negative for nasal congestion and sore throat.    Respiratory:  Positive for wheezing. Negative for cough and chest tightness.         No snoring. Wheezing At times. Cont.to monitor. No h/o Asthma.  Consider albuterol/Singulair   Cardiovascular:  Negative for chest pain and palpitations.   Gastrointestinal:  Positive for reflux. Negative for abdominal pain, constipation, diarrhea, nausea and vomiting.        Rare. Rec reflux prec. Pepcid prn.   Endocrine: Negative for cold intolerance and heat intolerance.   Genitourinary:  Negative for bladder incontinence, dysuria and frequency.   Musculoskeletal:  Positive for  "arthralgias. Negative for myalgias.        Hips.    Neurological:  Positive for headaches. Negative for dizziness.   Psychiatric/Behavioral:  Negative for dysphoric mood. The patient is not nervous/anxious.       Medications:    Current Outpatient Medications:     RESTASIS 0.05 % ophthalmic emulsion, , Disp: , Rfl:     triamcinolone (KENALOG) 0.5 % ointment, Apply topically 2 (two) times daily. Apply to affected area BID x 2 wks prn rash, Disp: 15 g, Rfl: 0    vitamin D (VITAMIN D3) 1000 units Tab, Take 1,000 Units by mouth once daily., Disp: , Rfl:     albuterol (PROAIR HFA) 90 mcg/actuation inhaler, Inhale 2 puffs into the lungs every 6 (six) hours as needed for Wheezing. Rescue, Disp: 18 g, Rfl: 0    amLODIPine (NORVASC) 10 MG tablet, Take 1 tablet (10 mg total) by mouth once daily., Disp: 90 tablet, Rfl: 1    flu vacc [id number] 6mos up,PF, (FLUARIX QUAD [id number], PF,) 60 mcg (15 mcg x 4)/0.5 mL Syrg, Inject in the muscle (Patient not taking: Reported on 7/15/2022), Disp: 0.5 mL, Rfl: 0    hydroCHLOROthiazide (HYDRODIURIL) 25 MG tablet, Take 1 tablet (25 mg total) by mouth once daily., Disp: 90 tablet, Rfl: 1    lisinopriL (PRINIVIL,ZESTRIL) 30 MG tablet, 1 tab daily, Disp: 90 tablet, Rfl: 1    metFORMIN (GLUCOPHAGE) 500 MG tablet, TAKE 1 TABLET BY MOUTH EVERY DAY, Disp: 90 tablet, Rfl: 0    pneumoc 20-vlad conj-dip cr,PF, (PREVNAR-20, PF,) 0.5 mL Syrg injection, Inject into the muscle., Disp: 0.5 mL, Rfl: 0     Allergies:  Review of patient's allergies indicates:   Allergen Reactions    Compazine [prochlorperazine] Swelling     Tongue swelling    Pcn [penicillins] Swelling     Tongue swelling       Physical Exam      Vital Signs  Temp: 98 °F (36.7 °C)  Temp Source: Oral  Pulse: 83  SpO2: 97 %  BP: (P) 138/86  BP Location: (P) Left arm  Patient Position: (P) Sitting  Pain Score: 0-No pain  Height and Weight  Height: 5' 4" (162.6 cm)  Weight: 70.8 kg (156 lb 1.4 oz)  BSA (Calculated - sq m): 1.79 sq meters  BMI " (Calculated): 26.8  Weight in (lb) to have BMI = 25: 145.3      Patient Position: (P) Sitting      Physical Exam  Vitals reviewed.   Constitutional:       General: She is not in acute distress.     Appearance: Normal appearance. She is not ill-appearing, toxic-appearing or diaphoretic.   HENT:      Head: Normocephalic and atraumatic.      Right Ear: Tympanic membrane normal.      Left Ear: Tympanic membrane normal.      Nose: Nose normal.      Mouth/Throat:      Mouth: Mucous membranes are moist.      Pharynx: No posterior oropharyngeal erythema.   Eyes:      Extraocular Movements: Extraocular movements intact.      Conjunctiva/sclera: Conjunctivae normal.      Pupils: Pupils are equal, round, and reactive to light.      Comments: Slight Rt upper lid ptosis   Neck:      Vascular: No carotid bruit.   Cardiovascular:      Rate and Rhythm: Normal rate and regular rhythm.      Pulses: Normal pulses.      Heart sounds: Normal heart sounds. No murmur heard.  Pulmonary:      Effort: No respiratory distress.      Breath sounds: Normal breath sounds. No wheezing.   Abdominal:      General: Bowel sounds are normal. There is no distension.      Palpations: Abdomen is soft.      Tenderness: There is no abdominal tenderness. There is no guarding or rebound.   Musculoskeletal:         General: Normal range of motion.   Skin:     General: Skin is warm.   Neurological:      General: No focal deficit present.      Mental Status: She is alert and oriented to person, place, and time.   Psychiatric:         Mood and Affect: Mood normal.         Behavior: Behavior normal.        Laboratory:  CBC:  Recent Labs   Lab 05/19/20  1125 06/30/22  0905   WBC 8.38 5.93   RBC 4.95 4.60   Hemoglobin 14.2 13.0   Hematocrit 46.1 39.8   Platelets 220 255   MCV 93 87   MCH 28.7 28.3   MCHC 30.8 L 32.7       CMP:  Recent Labs   Lab 05/19/20  1125 06/17/20  1031 06/28/21  0814 02/15/22  0958 06/30/22  0905   Glucose 98   < > 91   < > 101   Calcium 9.7    < > 10.0   < > 9.9   Albumin 4.5  --  4.2  --  4.1   Total Protein 7.9  --  7.6  --  7.3   Sodium 143   < > 143   < > 142   Potassium 3.9   < > 3.7   < > 4.3   CO2 26   < > 25   < > 26   Chloride 106   < > 105   < > 108   BUN 21   < > 20   < > 13   Creatinine 1.1   < > 1.0   < > 0.9   Alkaline Phosphatase 91  --  74  --  73   ALT 14  --  12  --  11   AST 22  --  17  --  16   Total Bilirubin 0.3  --  0.4  --  0.2    < > = values in this interval not displayed.     LIPIDS:  Recent Labs   Lab 05/19/20  1125 06/28/21  0814 10/27/21  0845 06/30/22  0905   TSH 2.376 2.527  --  2.747   HDL 78 H 66 75 70   Cholesterol 253 H 249 H 223 H 250 H   Triglycerides 67 98 162 H 121   LDL Cholesterol 161.6 H 163.4 H 115.6 155.8   HDL/Cholesterol Ratio 30.8 26.5 33.6 28.0   Non-HDL Cholesterol 175 183 148 180   Total Cholesterol/HDL Ratio 3.2 3.8 3.0 3.6       TSH:  Recent Labs   Lab 05/19/20  1125 06/28/21  0814 06/30/22  0905   TSH 2.376 2.527 2.747       A1C:  Recent Labs   Lab 06/28/21  0814 06/30/22  0905   Hemoglobin A1C 5.1 5.1       Other:   Recent Labs   Lab 06/01/20  1454   Testosterone, Total 20   Estradiol <10 A     Recent Labs   Lab 05/19/20  1125   Hepatitis C Ab Negative       Assessment/Plan     Cary Claudio is a 63 y.o.female with:    Hypertension, unspecified type  -     hydroCHLOROthiazide (HYDRODIURIL) 25 MG tablet; Take 1 tablet (25 mg total) by mouth once daily.  Dispense: 90 tablet; Refill: 1  -     amLODIPine (NORVASC) 10 MG tablet; Take 1 tablet (10 mg total) by mouth once daily.  Dispense: 90 tablet; Refill: 1  -     lisinopriL (PRINIVIL,ZESTRIL) 30 MG tablet; 1 tab daily  Dispense: 90 tablet; Refill: 1  -     Ambulatory referral/consult to Cardiology; Future; Expected date: 04/17/2023  -     Comprehensive Metabolic Panel; Future; Expected date: 04/10/2023  - Controlled.  Cont current.     Hyperlipidemia, unspecified hyperlipidemia type  -     Lipid Panel; Future; Expected date: 04/10/2023  - Uncontrolled.   Repeat FLP.    Metabolic syndrome  -     metFORMIN (GLUCOPHAGE) 500 MG tablet; TAKE 1 TABLET BY MOUTH EVERY DAY  Dispense: 90 tablet; Refill: 0  - Pt on Metformin. Pt is going to decide if she would like to continue this regimen. Repeat Ha1c.     Wheezing  -     albuterol (PROAIR HFA) 90 mcg/actuation inhaler; Inhale 2 puffs into the lungs every 6 (six) hours as needed for Wheezing. Rescue  Dispense: 18 g; Refill: 0  - Will give albuterol trial to see if this helps.     Osteopenia, unspecified location  - Stable.  Cont current regimen.    Postmenopausal  -     DXA Bone Density Axial Skeleton 1 or more sites; Future; Expected date: 06/17/2023    IFG (impaired fasting glucose)  -     Hemoglobin A1C; Future; Expected date: 04/10/2023  - Pt on Metformin. Check Ha1c.     Overweight (BMI 25.0-29.9)  - Cont low carb diet and exercise     Family history of diabetes mellitus  -     Hemoglobin A1C; Future; Expected date: 04/10/2023    Screening mammogram, encounter for  -     Mammo Digital Screening Bilat w/ Ang; Future; Expected date: 04/10/2023       Chronic conditions status updated as per HPI.  Other than changes above, cont current medications and maintain follow up with specialists.  Follow up in about 6 months (around 10/10/2023) for fu chronic issues or sooner if needed.      Nicole Rich MD  [facility]

## 2023-04-10 ENCOUNTER — LAB VISIT (OUTPATIENT)
Dept: LAB | Facility: HOSPITAL | Age: 63
End: 2023-04-10
Attending: INTERNAL MEDICINE
Payer: MEDICARE

## 2023-04-10 ENCOUNTER — OFFICE VISIT (OUTPATIENT)
Dept: PRIMARY CARE CLINIC | Facility: CLINIC | Age: 63
End: 2023-04-10
Payer: MEDICARE

## 2023-04-10 VITALS
HEIGHT: 64 IN | BODY MASS INDEX: 26.64 KG/M2 | TEMPERATURE: 98 F | WEIGHT: 156.06 LBS | HEART RATE: 83 BPM | OXYGEN SATURATION: 97 % | SYSTOLIC BLOOD PRESSURE: 150 MMHG | DIASTOLIC BLOOD PRESSURE: 82 MMHG

## 2023-04-10 DIAGNOSIS — R73.01 IFG (IMPAIRED FASTING GLUCOSE): ICD-10-CM

## 2023-04-10 DIAGNOSIS — E66.3 OVERWEIGHT (BMI 25.0-29.9): ICD-10-CM

## 2023-04-10 DIAGNOSIS — M85.80 OSTEOPENIA, UNSPECIFIED LOCATION: ICD-10-CM

## 2023-04-10 DIAGNOSIS — R73.9 HYPERGLYCEMIA: ICD-10-CM

## 2023-04-10 DIAGNOSIS — I10 HYPERTENSION, UNSPECIFIED TYPE: Primary | ICD-10-CM

## 2023-04-10 DIAGNOSIS — E88.810 METABOLIC SYNDROME: ICD-10-CM

## 2023-04-10 DIAGNOSIS — Z78.0 POSTMENOPAUSAL: ICD-10-CM

## 2023-04-10 DIAGNOSIS — E78.5 HYPERLIPIDEMIA, UNSPECIFIED HYPERLIPIDEMIA TYPE: ICD-10-CM

## 2023-04-10 DIAGNOSIS — Z83.3 FAMILY HISTORY OF DIABETES MELLITUS: ICD-10-CM

## 2023-04-10 DIAGNOSIS — I10 HYPERTENSION, UNSPECIFIED TYPE: ICD-10-CM

## 2023-04-10 DIAGNOSIS — R06.2 WHEEZING: ICD-10-CM

## 2023-04-10 DIAGNOSIS — Z12.31 SCREENING MAMMOGRAM, ENCOUNTER FOR: ICD-10-CM

## 2023-04-10 LAB
ESTIMATED AVG GLUCOSE: 97 MG/DL (ref 68–131)
HBA1C MFR BLD: 5 % (ref 4–5.6)

## 2023-04-10 PROCEDURE — 3008F BODY MASS INDEX DOCD: CPT | Mod: HCNC,CPTII,S$GLB, | Performed by: INTERNAL MEDICINE

## 2023-04-10 PROCEDURE — 99214 PR OFFICE/OUTPT VISIT, EST, LEVL IV, 30-39 MIN: ICD-10-PCS | Mod: HCNC,S$GLB,, | Performed by: INTERNAL MEDICINE

## 2023-04-10 PROCEDURE — 3079F PR MOST RECENT DIASTOLIC BLOOD PRESSURE 80-89 MM HG: ICD-10-PCS | Mod: HCNC,CPTII,S$GLB, | Performed by: INTERNAL MEDICINE

## 2023-04-10 PROCEDURE — 1160F PR REVIEW ALL MEDS BY PRESCRIBER/CLIN PHARMACIST DOCUMENTED: ICD-10-PCS | Mod: HCNC,CPTII,S$GLB, | Performed by: INTERNAL MEDICINE

## 2023-04-10 PROCEDURE — 99214 OFFICE O/P EST MOD 30 MIN: CPT | Mod: HCNC,S$GLB,, | Performed by: INTERNAL MEDICINE

## 2023-04-10 PROCEDURE — 80061 LIPID PANEL: CPT | Mod: HCNC | Performed by: INTERNAL MEDICINE

## 2023-04-10 PROCEDURE — 4010F PR ACE/ARB THEARPY RXD/TAKEN: ICD-10-PCS | Mod: HCNC,CPTII,S$GLB, | Performed by: INTERNAL MEDICINE

## 2023-04-10 PROCEDURE — 36415 COLL VENOUS BLD VENIPUNCTURE: CPT | Mod: HCNC,PN | Performed by: INTERNAL MEDICINE

## 2023-04-10 PROCEDURE — 80053 COMPREHEN METABOLIC PANEL: CPT | Mod: HCNC | Performed by: INTERNAL MEDICINE

## 2023-04-10 PROCEDURE — 1159F MED LIST DOCD IN RCRD: CPT | Mod: HCNC,CPTII,S$GLB, | Performed by: INTERNAL MEDICINE

## 2023-04-10 PROCEDURE — 99999 PR PBB SHADOW E&M-EST. PATIENT-LVL V: CPT | Mod: PBBFAC,HCNC,, | Performed by: INTERNAL MEDICINE

## 2023-04-10 PROCEDURE — 1159F PR MEDICATION LIST DOCUMENTED IN MEDICAL RECORD: ICD-10-PCS | Mod: HCNC,CPTII,S$GLB, | Performed by: INTERNAL MEDICINE

## 2023-04-10 PROCEDURE — 4010F ACE/ARB THERAPY RXD/TAKEN: CPT | Mod: HCNC,CPTII,S$GLB, | Performed by: INTERNAL MEDICINE

## 2023-04-10 PROCEDURE — 3077F SYST BP >= 140 MM HG: CPT | Mod: HCNC,CPTII,S$GLB, | Performed by: INTERNAL MEDICINE

## 2023-04-10 PROCEDURE — 3079F DIAST BP 80-89 MM HG: CPT | Mod: HCNC,CPTII,S$GLB, | Performed by: INTERNAL MEDICINE

## 2023-04-10 PROCEDURE — 83036 HEMOGLOBIN GLYCOSYLATED A1C: CPT | Mod: HCNC | Performed by: INTERNAL MEDICINE

## 2023-04-10 PROCEDURE — 3008F PR BODY MASS INDEX (BMI) DOCUMENTED: ICD-10-PCS | Mod: HCNC,CPTII,S$GLB, | Performed by: INTERNAL MEDICINE

## 2023-04-10 PROCEDURE — 3077F PR MOST RECENT SYSTOLIC BLOOD PRESSURE >= 140 MM HG: ICD-10-PCS | Mod: HCNC,CPTII,S$GLB, | Performed by: INTERNAL MEDICINE

## 2023-04-10 PROCEDURE — 99999 PR PBB SHADOW E&M-EST. PATIENT-LVL V: ICD-10-PCS | Mod: PBBFAC,HCNC,, | Performed by: INTERNAL MEDICINE

## 2023-04-10 PROCEDURE — 1160F RVW MEDS BY RX/DR IN RCRD: CPT | Mod: HCNC,CPTII,S$GLB, | Performed by: INTERNAL MEDICINE

## 2023-04-10 RX ORDER — AMLODIPINE BESYLATE 10 MG/1
10 TABLET ORAL DAILY
Qty: 90 TABLET | Refills: 1 | Status: SHIPPED | OUTPATIENT
Start: 2023-04-10 | End: 2023-07-13

## 2023-04-10 RX ORDER — CHOLECALCIFEROL (VITAMIN D3) 25 MCG
1000 TABLET ORAL DAILY
COMMUNITY

## 2023-04-10 RX ORDER — METFORMIN HYDROCHLORIDE 500 MG/1
TABLET ORAL
Qty: 90 TABLET | Refills: 0 | Status: SHIPPED | OUTPATIENT
Start: 2023-04-10 | End: 2023-07-09

## 2023-04-10 RX ORDER — ALBUTEROL SULFATE 90 UG/1
2 AEROSOL, METERED RESPIRATORY (INHALATION) EVERY 6 HOURS PRN
Qty: 18 G | Refills: 0 | Status: SHIPPED | OUTPATIENT
Start: 2023-04-10 | End: 2024-02-28

## 2023-04-10 RX ORDER — LISINOPRIL 30 MG/1
TABLET ORAL
Qty: 90 TABLET | Refills: 1 | Status: SHIPPED | OUTPATIENT
Start: 2023-04-10 | End: 2023-07-13

## 2023-04-10 RX ORDER — HYDROCHLOROTHIAZIDE 25 MG/1
25 TABLET ORAL DAILY
Qty: 90 TABLET | Refills: 1 | Status: SHIPPED | OUTPATIENT
Start: 2023-04-10 | End: 2023-08-18 | Stop reason: ALTCHOICE

## 2023-04-11 LAB
ALBUMIN SERPL BCP-MCNC: 4.4 G/DL (ref 3.5–5.2)
ALP SERPL-CCNC: 93 U/L (ref 55–135)
ALT SERPL W/O P-5'-P-CCNC: 11 U/L (ref 10–44)
ANION GAP SERPL CALC-SCNC: 16 MMOL/L (ref 8–16)
AST SERPL-CCNC: 18 U/L (ref 10–40)
BILIRUB SERPL-MCNC: 0.3 MG/DL (ref 0.1–1)
BUN SERPL-MCNC: 22 MG/DL (ref 8–23)
CALCIUM SERPL-MCNC: 10.6 MG/DL (ref 8.7–10.5)
CHLORIDE SERPL-SCNC: 103 MMOL/L (ref 95–110)
CHOLEST SERPL-MCNC: 257 MG/DL (ref 120–199)
CHOLEST/HDLC SERPL: 3.8 {RATIO} (ref 2–5)
CO2 SERPL-SCNC: 22 MMOL/L (ref 23–29)
CREAT SERPL-MCNC: 1.1 MG/DL (ref 0.5–1.4)
EST. GFR  (NO RACE VARIABLE): 55.8 ML/MIN/1.73 M^2
GLUCOSE SERPL-MCNC: 96 MG/DL (ref 70–110)
HDLC SERPL-MCNC: 67 MG/DL (ref 40–75)
HDLC SERPL: 26.1 % (ref 20–50)
LDLC SERPL CALC-MCNC: 163.6 MG/DL (ref 63–159)
NONHDLC SERPL-MCNC: 190 MG/DL
POTASSIUM SERPL-SCNC: 3.8 MMOL/L (ref 3.5–5.1)
PROT SERPL-MCNC: 7.9 G/DL (ref 6–8.4)
SODIUM SERPL-SCNC: 141 MMOL/L (ref 136–145)
TRIGL SERPL-MCNC: 132 MG/DL (ref 30–150)

## 2023-04-20 ENCOUNTER — OFFICE VISIT (OUTPATIENT)
Dept: CARDIOLOGY | Facility: CLINIC | Age: 63
End: 2023-04-20
Payer: MEDICARE

## 2023-04-20 VITALS
HEIGHT: 63 IN | HEART RATE: 88 BPM | SYSTOLIC BLOOD PRESSURE: 136 MMHG | DIASTOLIC BLOOD PRESSURE: 84 MMHG | BODY MASS INDEX: 27.07 KG/M2 | WEIGHT: 152.75 LBS

## 2023-04-20 DIAGNOSIS — R55 VASOVAGAL SYNCOPE: ICD-10-CM

## 2023-04-20 DIAGNOSIS — Z13.6 ENCOUNTER FOR SCREENING FOR CARDIOVASCULAR DISORDERS: Primary | ICD-10-CM

## 2023-04-20 DIAGNOSIS — Q21.10 ASD (ATRIAL SEPTAL DEFECT): ICD-10-CM

## 2023-04-20 DIAGNOSIS — I10 PRIMARY HYPERTENSION: ICD-10-CM

## 2023-04-20 PROCEDURE — 3079F DIAST BP 80-89 MM HG: CPT | Mod: HCNC,CPTII,S$GLB, | Performed by: INTERNAL MEDICINE

## 2023-04-20 PROCEDURE — 4010F ACE/ARB THERAPY RXD/TAKEN: CPT | Mod: HCNC,CPTII,S$GLB, | Performed by: INTERNAL MEDICINE

## 2023-04-20 PROCEDURE — 99999 PR PBB SHADOW E&M-EST. PATIENT-LVL IV: CPT | Mod: PBBFAC,HCNC,, | Performed by: INTERNAL MEDICINE

## 2023-04-20 PROCEDURE — 3079F PR MOST RECENT DIASTOLIC BLOOD PRESSURE 80-89 MM HG: ICD-10-PCS | Mod: HCNC,CPTII,S$GLB, | Performed by: INTERNAL MEDICINE

## 2023-04-20 PROCEDURE — 1159F PR MEDICATION LIST DOCUMENTED IN MEDICAL RECORD: ICD-10-PCS | Mod: HCNC,CPTII,S$GLB, | Performed by: INTERNAL MEDICINE

## 2023-04-20 PROCEDURE — 1160F RVW MEDS BY RX/DR IN RCRD: CPT | Mod: HCNC,CPTII,S$GLB, | Performed by: INTERNAL MEDICINE

## 2023-04-20 PROCEDURE — 3075F PR MOST RECENT SYSTOLIC BLOOD PRESS GE 130-139MM HG: ICD-10-PCS | Mod: HCNC,CPTII,S$GLB, | Performed by: INTERNAL MEDICINE

## 2023-04-20 PROCEDURE — 1159F MED LIST DOCD IN RCRD: CPT | Mod: HCNC,CPTII,S$GLB, | Performed by: INTERNAL MEDICINE

## 2023-04-20 PROCEDURE — 3288F FALL RISK ASSESSMENT DOCD: CPT | Mod: HCNC,CPTII,S$GLB, | Performed by: INTERNAL MEDICINE

## 2023-04-20 PROCEDURE — 3288F PR FALLS RISK ASSESSMENT DOCUMENTED: ICD-10-PCS | Mod: HCNC,CPTII,S$GLB, | Performed by: INTERNAL MEDICINE

## 2023-04-20 PROCEDURE — 1160F PR REVIEW ALL MEDS BY PRESCRIBER/CLIN PHARMACIST DOCUMENTED: ICD-10-PCS | Mod: HCNC,CPTII,S$GLB, | Performed by: INTERNAL MEDICINE

## 2023-04-20 PROCEDURE — 1101F PT FALLS ASSESS-DOCD LE1/YR: CPT | Mod: HCNC,CPTII,S$GLB, | Performed by: INTERNAL MEDICINE

## 2023-04-20 PROCEDURE — 1101F PR PT FALLS ASSESS DOC 0-1 FALLS W/OUT INJ PAST YR: ICD-10-PCS | Mod: HCNC,CPTII,S$GLB, | Performed by: INTERNAL MEDICINE

## 2023-04-20 PROCEDURE — 99204 OFFICE O/P NEW MOD 45 MIN: CPT | Mod: HCNC,S$GLB,, | Performed by: INTERNAL MEDICINE

## 2023-04-20 PROCEDURE — 3075F SYST BP GE 130 - 139MM HG: CPT | Mod: HCNC,CPTII,S$GLB, | Performed by: INTERNAL MEDICINE

## 2023-04-20 PROCEDURE — 99999 PR PBB SHADOW E&M-EST. PATIENT-LVL IV: ICD-10-PCS | Mod: PBBFAC,HCNC,, | Performed by: INTERNAL MEDICINE

## 2023-04-20 PROCEDURE — 3044F PR MOST RECENT HEMOGLOBIN A1C LEVEL <7.0%: ICD-10-PCS | Mod: HCNC,CPTII,S$GLB, | Performed by: INTERNAL MEDICINE

## 2023-04-20 PROCEDURE — 4010F PR ACE/ARB THEARPY RXD/TAKEN: ICD-10-PCS | Mod: HCNC,CPTII,S$GLB, | Performed by: INTERNAL MEDICINE

## 2023-04-20 PROCEDURE — 3044F HG A1C LEVEL LT 7.0%: CPT | Mod: HCNC,CPTII,S$GLB, | Performed by: INTERNAL MEDICINE

## 2023-04-20 PROCEDURE — 3008F BODY MASS INDEX DOCD: CPT | Mod: HCNC,CPTII,S$GLB, | Performed by: INTERNAL MEDICINE

## 2023-04-20 PROCEDURE — 3008F PR BODY MASS INDEX (BMI) DOCUMENTED: ICD-10-PCS | Mod: HCNC,CPTII,S$GLB, | Performed by: INTERNAL MEDICINE

## 2023-04-20 PROCEDURE — 99204 PR OFFICE/OUTPT VISIT, NEW, LEVL IV, 45-59 MIN: ICD-10-PCS | Mod: HCNC,S$GLB,, | Performed by: INTERNAL MEDICINE

## 2023-04-20 RX ORDER — MULTIVIT WITH MINERALS/HERBS
1 TABLET ORAL DAILY
COMMUNITY

## 2023-04-20 NOTE — PROGRESS NOTES
Subjective:     Problem List:  Hypertension since her 40s  ASD s/p closure 1998    HPI:   Cary Claudio is a 63 y.o. female referred by Nicole Rich MD for evaluation of Hypertension.    She was followed by Dr. Blaze Monroy at  and was found to have an ASD. Her mother had a relatively rare cardiac condition and she and her siblings were recommended to have an echocardiogram.     In March/April 2022 she had syncope after standing for a prolonged period at the [employer]. Another episode occurred a few years earlier while at a few years earlier while at [employer]. She also had syncope while having blood drawn around 2000.  An echo in 2020 revealed mild RV enlargement and dysfunction.  She reports shortness of breath and leg swelling sometimes.  She does not report chest discomfort with exertion.  She was prescribed metformin for weight loss and not diabtes.        Review of patient's allergies indicates:   Allergen Reactions    Compazine [prochlorperazine] Swelling     Tongue swelling    Pcn [penicillins] Swelling     Tongue swelling        Current Outpatient Medications   Medication Sig    albuterol (PROAIR HFA) 90 mcg/actuation inhaler Inhale 2 puffs into the lungs every 6 (six) hours as needed for Wheezing. Rescue    amLODIPine (NORVASC) 10 MG tablet Take 1 tablet (10 mg total) by mouth once daily.    b complex vitamins tablet Take 1 tablet by mouth once daily.    hydroCHLOROthiazide (HYDRODIURIL) 25 MG tablet Take 1 tablet (25 mg total) by mouth once daily.    lisinopriL (PRINIVIL,ZESTRIL) 30 MG tablet 1 tab daily    metFORMIN (GLUCOPHAGE) 500 MG tablet TAKE 1 TABLET BY MOUTH EVERY DAY    RESTASIS 0.05 % ophthalmic emulsion Place 1 drop into both eyes as needed.    triamcinolone (KENALOG) 0.5 % ointment Apply topically 2 (two) times daily. Apply to affected area BID x 2 wks prn rash    vitamin D (VITAMIN D3) 1000 units Tab Take 1,000 Units by mouth once daily.     No current facility-administered  "medications for this visit.         Past Medical and Surgical history:  Cary Claudio  has a past medical history of Anemia, Cardiac anomaly, congenital, Congenital heart anomaly, Fibroids, History of migraine, Hyperlipidemia, Hypertension, Insomnia, Nicotine dependence in remission, and PUD (peptic ulcer disease).   Past Surgical History:   Procedure Laterality Date    HYSTERECTOMY  2014    repair of congenital heart disease  1998    Repair of ASD         Social history:  Cary Claudio  reports that she quit smoking about 18 years ago. Her smoking use included cigarettes. She has never used smokeless tobacco. She reports that she does not currently use alcohol. She reports that she does not use drugs.    Family history:  Family History   Problem Relation Age of Onset    Heart disease Mother 34    Hypertension Mother     Heart disease Father 70    Heart attack Father 80    Rheum arthritis Sister     Diabetes Brother     Lung cancer Brother         mets to brain    Heart attacks under age 50 Maternal Grandfather 42    Breast cancer Neg Hx     Colon cancer Neg Hx     Ovarian cancer Neg Hx     Stroke Neg Hx             Objective:   /84   Pulse 88   Ht 5' 3" (1.6 m)   Wt 69.3 kg (152 lb 12.5 oz)   LMP 12/16/2014 (Approximate)   BMI 27.06 kg/m²    Physical Exam  Constitutional:       Appearance: She is well-developed.      Comments:      HENT:      Head: Normocephalic.   Neck:      Vascular: No carotid bruit or JVD.   Cardiovascular:      Rate and Rhythm: Normal rate and regular rhythm.      Pulses:           Radial pulses are 2+ on the right side and 2+ on the left side.        Posterior tibial pulses are 2+ on the right side and 2+ on the left side.      Heart sounds: S1 normal and S2 normal. No murmur heard.    No gallop.   Pulmonary:      Effort: Pulmonary effort is normal.      Breath sounds: No wheezing or rales.   Chest:      Chest wall: There is no dullness to percussion.   Abdominal:      General: " There is no abdominal bruit.      Palpations: Abdomen is soft. There is no hepatomegaly or splenomegaly.      Tenderness: There is no abdominal tenderness.   Musculoskeletal:      Right lower leg: No edema.      Left lower leg: No edema.   Skin:     General: Skin is warm and dry.      Findings: No bruising or rash.      Nails: There is no clubbing.   Neurological:      Mental Status: She is alert and oriented to person, place, and time.      Gait: Gait normal.   Psychiatric:         Speech: Speech normal.         Behavior: Behavior normal.         Judgment: Judgment normal.         Lab Results   Component Value Date    CHOL 257 (H) 04/10/2023    CHOL 250 (H) 06/30/2022    CHOL 223 (H) 10/27/2021     Lab Results   Component Value Date    HDL 67 04/10/2023    HDL 70 06/30/2022    HDL 75 10/27/2021     Lab Results   Component Value Date    LDLCALC 163.6 (H) 04/10/2023    LDLCALC 155.8 06/30/2022    LDLCALC 115.6 10/27/2021     Lab Results   Component Value Date    TRIG 132 04/10/2023    TRIG 121 06/30/2022    TRIG 162 (H) 10/27/2021     Lab Results   Component Value Date    CHOLHDL 26.1 04/10/2023    CHOLHDL 28.0 06/30/2022    CHOLHDL 33.6 10/27/2021     Lab Results   Component Value Date    ALT 11 04/10/2023    AST 18 04/10/2023    ALKPHOS 93 04/10/2023    BILITOT 0.3 04/10/2023      Lab Results   Component Value Date    CREATININE 1.1 04/10/2023    BUN 22 04/10/2023     04/10/2023    K 3.8 04/10/2023     04/10/2023    CO2 22 (L) 04/10/2023       Results for orders placed during the hospital encounter of 08/05/22    Echo    Interpretation Summary  · The left ventricle is normal in size with normal systolic function. The estimated ejection fraction is 68%.  · Normal left ventricular diastolic function.  · Normal right ventricular size with normal right ventricular systolic function.       No valid procedures specified.    The 10-year ASCVD risk score (Darren DK, et al., 2019) is: 8.8%    Values used to  calculate the score:      Age: 63 years      Sex: Female      Is Non- : No      Diabetic: No      Tobacco smoker: No      Systolic Blood Pressure: 136 mmHg      Is BP treated: Yes      HDL Cholesterol: 67 mg/dL      Total Cholesterol: 257 mg/dL        Assessment and Plan:       ICD-10-CM ICD-9-CM   1. Encounter for screening for cardiovascular disorders  Z13.6 V81.2   2. Primary hypertension  I10 401.9   3. ASD (atrial septal defect) s/p surgical repair  Q21.10 745.5   4. Vasovagal syncope  R55 780.2        She is s/p surgical repair of an atrial septal defect.  We will try to get the surgical report.  A recent echocardiogram revealed mild RV enlargement and dysfunction which is common even several years after surgery.  Mother had a congenital cardiac problem.  She will try and find out the details.  It is reasonable for her children to be screened for an ASD.  Coronary artery calcium determination by CT scan to refine 10 year and lifetime risk assessment of myocardial infarction beyond models using traditional risk factors.   Syncope is almost definitely vasovagal.  I did not recommend compression stockings.  Adequate hydration with electrolytes recommended    Orders entered during this encounter:    Encounter for screening for cardiovascular disorders  -     CT Calcium Scoring Cardiac; Future; Expected date: 04/20/2023    Primary hypertension  -     Ambulatory referral/consult to Cardiology    ASD (atrial septal defect) s/p surgical repair    Vasovagal syncope         Follow up if symptoms worsen or fail to improve.

## 2023-04-20 NOTE — PATIENT INSTRUCTIONS
IV Liquid powder or 6-8 oz V8 juice along with water. If you are unable to bring your own juice or drink into a concert venue take the IV Liquid powder.    LDL - bad type - improves with diet and medications: typically statins; most other medications that lower LDL have not been proven to prevent heart attacks.  May not improve significantly with exercise alone.  Should be less than 100 mg/dl, but the lower the better. Statins (cholesterol lowering meds) lower LDL. If you have coronary artery disease or blockages anywhere else in the body, it should be well below 70 mg/dl.    HDL - good type - improves with exercise.  Ideally greater than 50 mg/dl. The proportion of HDL to the total cholesterol is more important than the absolute HDL.  This means a HDL of 45 out of a total cholesterol of 130 mg'dl is pretty good, but the same HDL out of a total of  200 mg/dl is not quite as good. A level of 30% or higher is ideal.    TGs (triglycerides) - also bad - can change very quickly and considerably with certain foods. Improve with diet, exercise and high dose fish oil.  In some cases a low carbohydrate diet will lower TGs better than a low fat diet.  Ideal range  mg/dl.     Low carb, Mediterranean style diet:     A sensible diet that limits the intake of sugars, saturated (bad) fats and trans fats while increasing the intake of unsaturated (good) fats and plant proteins is the basis of the current dietary recommendations.      We now recommend drastically reducing the intake of sugar. There is less emphasis on excluding all fat and more emphasis on the types of different fats.    Cholesterol in our food is generally present in relatively small amounts. New dietary guidelines are less obsessed with the amount of cholesterol. However please do not confuse this with the role of cholesterol in our blood and arteries. The liver converts certain foods into cholesterol.  It is this cholesterol and other fats that clogs up our  "arteries.      Most foods that are high in cholesterol are also high in saturated fat. But there is much more saturated fat than cholesterol in these foods. The saturated fat content in food matters more than cholesterol. On the other hand, there are a handful of foods that are high in cholesterol but do not contain much saturated fat: eggs, shrimp, crab legs and crawfish are OK to eat in small quantities as long as you do not deep houser them. So a few (2-3) eggs a week are fine (both the white and the yolk), but you can eat as many egg whites as you want. Also, some of these same foods irritate the inner lining of blood vessels by inducing inflammation (see below).  This occurs even if your blood cholesterol levels are "normal". So you should avoid foods that are high in saturated fat and sugar even if your blood cholesterol levels are normal.      Saturated fat is the bad fat - you should limit your intake of this. Deep fried foods, meats and other animal fats are high in saturated fat. Cookies, donuts and most dessert and cakes are usually high in both saturated fat and sugar.       Unsaturated fat is the good fat. It contains the same number of calories as saturated fat but these fats do not get deposited in our arteries. The Mediterranean style diet encourages the intake of unsaturated fat - olive oil, avocado and unsalted nuts. So instead of baking a piece of fish, consider pan-frying it using olive oil.     You should eat a few servings of vegetables (and fruit as long as you are not diabetic) everyday. Substitute some plant proteins in place of meat: beans, lentils, quinoa and oatmeal. They are lean proteins.    Vegetables (particularly green vegetables such as broccoli, kale and spinach) have anti-inflammatory properties. Some fruits (certain berries) have anti-oxidant properties. However I think foods that reduce vascular inflammation are much  more important than the anti-oxidant effect of fruits. Eat more " of the vegetables with anti-oxidant properties.    I recommend using plant based butter either olive oil butter or avocado butter made by Deja Sal[employer]. Do not use stick butter or stick margarine.       Trans fats should definitely be avoided. Most foods that are labelled as containing 0 gms of trans fat may still contain several hundred milligrams of trans fat: creamer, margarine, dough, deep fried foods, ready made frosting, potato, corn and torilla chips, cakes, cookies, pie crusts and crackers containing shortening made with hydrogenated vegetable oil.

## 2023-04-21 NOTE — PROGRESS NOTES
I sent pt a my chart message -  I reviewed your labs.  Your Cholesterol remains high. The 10-year ASCVD risk score (Risk of having a cardiovascular event within 10 yrs) is: 8.8%.  I would again rec you consider starting on a statin. I would like to start you on Atorvastatin 20 mg daily. There are potential Side effects including muscle pain,  muscle weakness, and hyperglycemia. Let me know if you are interested in starting this and I can send you a prescription to your pharmacy. Your kidney function was stable and liver functions looked good.  Your Ha1c was normal at 5.0. Your calcium was slightly high at 10.6. Are you taking any calcium supplementations?  If so, stop this and we can recheck your calcium at next visit.  No further recommendations at this time.    Dr. UGARTE

## 2023-05-01 ENCOUNTER — HOSPITAL ENCOUNTER (OUTPATIENT)
Dept: RADIOLOGY | Facility: HOSPITAL | Age: 63
Discharge: HOME | End: 2023-05-01
Attending: INTERNAL MEDICINE
Payer: MEDICARE

## 2023-05-01 DIAGNOSIS — Z13.6 ENCOUNTER FOR SCREENING FOR CARDIOVASCULAR DISORDERS: ICD-10-CM

## 2023-05-01 PROCEDURE — 75571 CT CALCIUM SCORING CARDIAC: ICD-10-PCS | Mod: 26,HCNC,, | Performed by: RADIOLOGY

## 2023-05-01 PROCEDURE — 75571 CT HRT W/O DYE W/CA TEST: CPT | Mod: TC,HCNC

## 2023-05-01 PROCEDURE — 75571 CT HRT W/O DYE W/CA TEST: CPT | Mod: 26,HCNC,, | Performed by: RADIOLOGY

## 2023-06-02 ENCOUNTER — HOSPITAL ENCOUNTER (OUTPATIENT)
Dept: RADIOLOGY | Facility: HOSPITAL | Age: 63
Discharge: HOME | End: 2023-06-02
Attending: INTERNAL MEDICINE
Payer: MEDICARE

## 2023-06-02 VITALS — WEIGHT: 152 LBS | BODY MASS INDEX: 26.93 KG/M2 | HEIGHT: 63 IN

## 2023-06-02 DIAGNOSIS — Z12.31 SCREENING MAMMOGRAM, ENCOUNTER FOR: ICD-10-CM

## 2023-06-02 PROCEDURE — 77063 BREAST TOMOSYNTHESIS BI: CPT | Mod: 26,,, | Performed by: RADIOLOGY

## 2023-06-02 PROCEDURE — 77067 SCR MAMMO BI INCL CAD: CPT | Mod: 26,,, | Performed by: RADIOLOGY

## 2023-06-02 PROCEDURE — 77063 MAMMO DIGITAL SCREENING BILAT WITH TOMO: ICD-10-PCS | Mod: 26,,, | Performed by: RADIOLOGY

## 2023-06-02 PROCEDURE — 77067 MAMMO DIGITAL SCREENING BILAT WITH TOMO: ICD-10-PCS | Mod: 26,,, | Performed by: RADIOLOGY

## 2023-06-02 PROCEDURE — 77067 SCR MAMMO BI INCL CAD: CPT | Mod: TC,PO

## 2023-06-05 ENCOUNTER — PES CALL (OUTPATIENT)
Dept: ADMINISTRATIVE | Facility: CLINIC | Age: 63
End: 2023-06-05
Payer: MEDICARE

## 2023-06-16 NOTE — PROGRESS NOTES
I sent pt a my chart message -  I reviewed your Mammogram -   The breasts are heterogeneously dense, which may obscure small masses. There is no evidence of suspicious masses, microcalcifications or architectural distortion.  Impression:   No mammographic evidence of malignancy.  Routine screening mammogram in 1 year is recommended.  Dr. UGARTE

## 2023-06-19 ENCOUNTER — APPOINTMENT (OUTPATIENT)
Dept: RADIOLOGY | Facility: CLINIC | Age: 63
End: 2023-06-19
Attending: INTERNAL MEDICINE
Payer: MEDICARE

## 2023-06-19 DIAGNOSIS — Z78.0 POSTMENOPAUSAL: ICD-10-CM

## 2023-06-19 PROCEDURE — 77080 DXA BONE DENSITY AXIAL SKELETON 1 OR MORE SITES: ICD-10-PCS | Mod: 26,,, | Performed by: INTERNAL MEDICINE

## 2023-06-19 PROCEDURE — 77080 DXA BONE DENSITY AXIAL: CPT | Mod: 26,,, | Performed by: INTERNAL MEDICINE

## 2023-06-19 PROCEDURE — 77080 DXA BONE DENSITY AXIAL: CPT | Mod: TC,PO

## 2023-06-23 NOTE — PROGRESS NOTES
I sent pt a my chart message -  I reviewed your DEXA/Bone Density which showed  - Osteoporosis.  I recommend you continue your calcium and Vitamin D supplements.  In addition, I rec weight-bearing  exercise at least 30 minutes 3 times/wk and ideally 5 times/wk.  No specific intensity.  Walking is fine. I just rec you pick a form of weight-bearing exercise you enjoy to facilitate long term compliance and benefit. I also recommend you consider starting therapy to strengthen your bones such as Alendronate 70 mg by mouth weekly vs Prolia which is a Q 6 month Injection.  Alendronate can cause GI upset and poss Osteonecrosis of the jaw. I would not rec if you are planning dental work.  You would need to take 30 min before food/beverage/other meds.  You would need to stay Upright for 30 minutes after taking. Let me know your thoughts. We can repeat your DEXA in 2yrs.    Dr. UGARTE

## 2023-07-03 ENCOUNTER — PATIENT MESSAGE (OUTPATIENT)
Dept: PRIMARY CARE CLINIC | Facility: CLINIC | Age: 63
End: 2023-07-03
Payer: MEDICARE

## 2023-07-05 NOTE — TELEPHONE ENCOUNTER
Sched her for a virtual to discuss BP regimen. Yes,  her Thyroid was checked last June and was normal  Dr. UGARTE

## 2023-07-09 NOTE — PROGRESS NOTES
[facility] Note    Chief Complaint    No chief complaint on file.      History of Present Illness      Cary Claudio is a 63 y.o.  HTN, Anemia, Duodenal ulcer presents to fu well visit. Last visit - 4/10/23    The patient location is: Work  The chief complaint leading to consultation is: Fu BP    Visit type: audiovisual    Face to Face time with patient: 10 min  10 minutes of total time spent on the encounter, which includes face to face time and non-face to face time preparing to see the patient (eg, review of tests), Obtaining and/or reviewing separately obtained history, Documenting clinical information in the electronic or other health record, Independently interpreting results (not separately reported) and communicating results to the patient/family/caregiver, or Care coordination (not separately reported).         Each patient to whom he or she provides medical services by telemedicine is:  (1) informed of the relationship between the physician and patient and the respective role of any other health care provider with respect to management of the patient; and (2) notified that he or she may decline to receive medical services by telemedicine and may withdraw from such care at any time.    Notes:     Syncope - [place] fair[place] and was standing for a prolonged period.  EMS checked her and BP was low. She felt she was going to pass out and went to sit down and lost consciousness for 30 sec.  She recalls not feeling that well earlier that AM and had a HA. No further episodes. She did not go to ER because she felt better right away.  She had h/o Prev Vasovagal response when donating blood > 20 yrs ago. Note she she had a prev ASD repair '98.  Echo - 8/5/22- EF 68%, Trace TR, Trace pulmonic Regurg.  EKG - 7/15/22 - NSR.  Fu by Cards, Dr. Mary. CT calcium score - 5/1/23 - 0     HLD -     HDL 67 - 4/10/23.The 10-year ASCVD risk score (Risk of  having a cardiovascular event within 10 yrs) is: 8.8%. We could consider starting her on a statin. I would like to start her on Atorvastatin 20 mg daily. She declined so referred her to [facility] for second opinion given recent syncopal episode and fam h/o CAD.  Fu by [facility], Dr. Mary. CT calcium score - 5/1/23 - 0     HTN - Pt uncontrolled on Norvasc 10 mg/d, HCTZ 25 mg, lisinopril 30 mg/d. Rare swelling.  Rec low sodium diet. She is doing well on this.  Echo -5/19/20 -  Concentric left ventricular remodeling, EF - 65%. Grade I (mild) left ventricular diastolic dysfunction consistent with impaired relaxation. Mild TR. Mildly reduced right ventricular systolic function. Mild Ventricular Enlargement. Note - potential ASD repair - inter-atrial septum does appear thickened and hyperechoic which could represent repair. No flow by color doppler. No evidence of significant RV dilation or dysfunction.    Echo - 8/5/22- EF 68%, Trace TR, Trace pulmonic Regurg.  BUN/Cr -13/0.9. Will inc Lisinopril to 30 mg/d. She reports home BP - 145. She c/o inc ankle swelling in amlodipine. She would like to stop Amlodipine. She sometimes has a rare cough. Will change lisinopril 30 mg to Olmesartan 40 mg/d.      H/o Duodenal ulcer - No longer on Omeprazole. Rec Avoid NSAIDS. ok for tylenol prn.     Osteoporosis-   I recommend Vitamin D supplements. Avoid Calcium due to prev elev Ca - 10.6.  In addition, I rec weight-bearing  exercise at least 30 minutes 3 times/wk and ideally 5 times/wk.  No specific intensity.  Walking is fine. I just rec you pick a form of weight-bearing exercise you enjoy to facilitate long term compliance and benefit. I also recommend you consider starting therapy to strengthen your bones such as Alendronate 70 mg by mouth weekly vs Prolia which is a Q 6 month Injection.  Alendronate can cause GI upset and poss Osteonecrosis of the jaw. I would not rec if you are planning dental work.    We can repeat  DEXA in 2yrs      Overweight  BMI - 26.79. Up 6 lb since last visit. Pt walks most days. She plays tennis intermittently. She lost 25 lb prev on Metformin.   Pt was started on Metformin 1 per OB for wt loss but didn't so stopped it.  Ha1c - 5.0.       HCM - Flu - 2/15/22- defers;  Tdap - 5/19/20;  PCV 20 - admin 4/10/23; PCV 13 - none;  PVX 23 - none;  Shingrix - #1 - 6/28/21 and #2 - 10/27/21; Zostavax - 10/8/14;  COVID - 19 Vaccine (J&J) - 3/10/21; #2 - declines;  MGM - 6/2/23- repeat 1 yr;   Left breast U/S 6/19/20 - benign simple cyst - repeat MGM in 1 yr   - has order; DEXA - 6/19/23 - + Osteoporosis;  PAP - 6/1/20 - neg; pt s/p partial Hys; Hep C Screen -5/19/20 neg;  HIV Screen - 5/19/20- neg; C-scope - 11/21/14 - repeat 10 yrs; Prev PCP - Dr. Rutledge; Ophtho - Dr. Martinez; Ob/GYN - Dr. Borges; well visit - 6/28/21     Patient Care Team:  Nicole Rich MD as PCP - General (Internal Medicine)  Florecita Cochran MA as Care Coordinator     Health Maintenance:  Immunization History   Administered Date(s) Administered    COVID-19, vector-nr, rS-Ad26, PF ([place]) 03/10/2021    Influenza - Quadrivalent 10/08/2014    Influenza - Quadrivalent - PF *Preferred* (6 months and older) 02/15/2022    Pneumococcal Conjugate - 20 Valent 04/10/2023    Tdap 05/19/2020    Zoster 10/08/2014    Zoster Recombinant 06/28/2021, 10/27/2021      Health Maintenance   Topic Date Due    Lipid Panel  04/10/2024    Mammogram  06/02/2024    DEXA Scan  06/19/2025    TETANUS VACCINE  05/19/2030    Hepatitis C Screening  Completed        Past Medical History:  Past Medical History:   Diagnosis Date    Anemia     Cardiac anomaly, congenital     pt had a repair for ?ASD '98 at [facility] with Dr. Aguilar    Congenital heart anomaly     Fibroids     uterine     History of migraine     Hyperlipidemia     Hypertension     Insomnia     Nicotine dependence in remission     PUD (peptic ulcer disease)        Past Surgical History:   has a past surgical history  that includes repair of congenital heart disease () and Hysterectomy ().    Family History:  family history includes Diabetes in her brother; Heart attack (age of onset: 80) in her father; Heart attacks under age 50 (age of onset: 42) in her maternal grandfather; Heart disease (age of onset: 34) in her mother; Heart disease (age of onset: 70) in her father; Hypertension in her mother; Lung cancer in her brother; Rheum arthritis in her sister.     Social History:  Social History     Tobacco Use    Smoking status: Former     Types: Cigarettes     Quit date: 2004     Years since quittin.0    Smokeless tobacco: Never    Tobacco comments:     a few cig on occasion x 2 yrs   Substance Use Topics    Alcohol use: Not Currently     Comment: rare    Drug use: No       Review of Systems   Constitutional:  Positive for diaphoresis. Negative for chills and fever.   Eyes:  Negative for visual disturbance.   Respiratory:  Positive for cough. Negative for chest tightness and shortness of breath.    Gastrointestinal:  Positive for constipation. Negative for abdominal pain, diarrhea, nausea and vomiting.   Neurological:  Positive for dizziness and headaches.        Not recently.       Medications:    Current Outpatient Medications:     albuterol (PROAIR HFA) 90 mcg/actuation inhaler, Inhale 2 puffs into the lungs every 6 (six) hours as needed for Wheezing. Rescue, Disp: 18 g, Rfl: 0    b complex vitamins tablet, Take 1 tablet by mouth once daily., Disp: , Rfl:     hydroCHLOROthiazide (HYDRODIURIL) 25 MG tablet, Take 1 tablet (25 mg total) by mouth once daily., Disp: 90 tablet, Rfl: 1    olmesartan (BENICAR) 40 MG tablet, Take 1 tablet (40 mg total) by mouth once daily., Disp: 90 tablet, Rfl: 0    RESTASIS 0.05 % ophthalmic emulsion, Place 1 drop into both eyes as needed., Disp: , Rfl:     triamcinolone (KENALOG) 0.5 % ointment, Apply topically 2 (two) times daily. Apply to affected area BID x 2 wks prn rash, Disp: 15  g, Rfl: 0    vitamin D (VITAMIN D3) 1000 units Tab, Take 1,000 Units by mouth once daily., Disp: , Rfl:      Allergies:  Review of patient's allergies indicates:   Allergen Reactions    Compazine [prochlorperazine] Swelling     Tongue swelling    Pcn [penicillins] Swelling     Tongue swelling       Physical Exam                    Physical Exam  Constitutional:       General: She is not in acute distress.     Appearance: Normal appearance. She is not ill-appearing, toxic-appearing or diaphoretic.   HENT:      Head: Normocephalic and atraumatic.   Pulmonary:      Effort: No respiratory distress.   Neurological:      General: No focal deficit present.      Mental Status: She is alert and oriented to person, place, and time.   Psychiatric:         Mood and Affect: Mood normal.         Behavior: Behavior normal.        Laboratory:  CBC:  Recent Labs   Lab 06/30/22  0905   WBC 5.93   RBC 4.60   Hemoglobin 13.0   Hematocrit 39.8   Platelets 255   MCV 87   MCH 28.3   MCHC 32.7       CMP:  Recent Labs   Lab 06/28/21  0814 02/15/22  0958 06/30/22  0905 04/10/23  1044   Glucose 91   < > 101 96   Calcium 10.0   < > 9.9 10.6 H   Albumin 4.2  --  4.1 4.4   Total Protein 7.6  --  7.3 7.9   Sodium 143   < > 142 141   Potassium 3.7   < > 4.3 3.8   CO2 25   < > 26 22 L   Chloride 105   < > 108 103   BUN 20   < > 13 22   Creatinine 1.0   < > 0.9 1.1   Alkaline Phosphatase 74  --  73 93   ALT 12  --  11 11   AST 17  --  16 18   Total Bilirubin 0.4  --  0.2 0.3    < > = values in this interval not displayed.     LIPIDS:  Recent Labs   Lab 06/28/21  0814 10/27/21  0845 06/30/22  0905 04/10/23  1044   TSH 2.527  --  2.747  --    HDL 66 75 70 67   Cholesterol 249 H 223 H 250 H 257 H   Triglycerides 98 162 H 121 132   LDL Cholesterol 163.4 H 115.6 155.8 163.6 H   HDL/Cholesterol Ratio 26.5 33.6 28.0 26.1   Non-HDL Cholesterol 183 148 180 190   Total Cholesterol/HDL Ratio 3.8 3.0 3.6 3.8       TSH:  Recent Labs   Lab 06/28/21  0814  06/30/22  0905   TSH 2.527 2.747       A1C:  Recent Labs   Lab 06/28/21  0814 06/30/22  0905 04/10/23  1044   Hemoglobin A1C 5.1 5.1 5.0       Assessment/Plan     Cary Claudio is a 63 y.o.female with:    Hypertension, unspecified type  -     olmesartan (BENICAR) 40 MG tablet; Take 1 tablet (40 mg total) by mouth once daily.  Dispense: 90 tablet; Refill: 0  -     Basic Metabolic Panel; Future; Expected date: 08/13/2023  - Rec low sodium diet.  Change lisinopril 30 and Amlodipine 10 mg to Olmesartan 40 mg and continue HCTZ. Monitor BP and call with any concerns. Repeat BMP and nurse bp check in 4 wks.     Hyperlipidemia, unspecified hyperlipidemia type  - Cont to monitor. Cont Lifestyle modification.     Osteoporosis, unspecified osteoporosis type, unspecified pathological fracture presence  - Cont Vit D and exercise. Avoid Calcium due to prev elev Calcium on suppl.     Pedal edema  - Stop amlodipine. Rec low sodium diet.        Chronic conditions status updated as per HPI.  Other than changes above, cont current medications and maintain follow up with specialists.   Follow up in about 3 months (around 10/13/2023) for fu chronic issues or sooner if needed.      Nicole Rich MD  [facility]                Answers submitted by the patient for this visit:  High Blood Pressure Questionnaire (Submitted on 7/12/2023)  Chief Complaint: Hypertension  Chronicity: new  Onset: more than 1 year ago  Progression since onset: waxing and waning  malaise/fatigue: Yes  peripheral edema: Yes  CAD risks: family history, obesity, post-menopausal state  Compliance problems: medication side effects  Past treatments: calcium channel blockers, diuretics  Improvement on treatment: moderate

## 2023-07-13 ENCOUNTER — PATIENT MESSAGE (OUTPATIENT)
Dept: PRIMARY CARE CLINIC | Facility: CLINIC | Age: 63
End: 2023-07-13
Payer: MEDICARE

## 2023-07-13 ENCOUNTER — OFFICE VISIT (OUTPATIENT)
Dept: PRIMARY CARE CLINIC | Facility: CLINIC | Age: 63
End: 2023-07-13
Payer: MEDICARE

## 2023-07-13 DIAGNOSIS — R60.0 PEDAL EDEMA: ICD-10-CM

## 2023-07-13 DIAGNOSIS — I10 HYPERTENSION, UNSPECIFIED TYPE: Primary | ICD-10-CM

## 2023-07-13 DIAGNOSIS — E78.5 HYPERLIPIDEMIA, UNSPECIFIED HYPERLIPIDEMIA TYPE: ICD-10-CM

## 2023-07-13 DIAGNOSIS — M81.0 OSTEOPOROSIS, UNSPECIFIED OSTEOPOROSIS TYPE, UNSPECIFIED PATHOLOGICAL FRACTURE PRESENCE: ICD-10-CM

## 2023-07-13 PROCEDURE — 99214 PR OFFICE/OUTPT VISIT, EST, LEVL IV, 30-39 MIN: ICD-10-PCS | Mod: HCNC,95,, | Performed by: INTERNAL MEDICINE

## 2023-07-13 PROCEDURE — 1159F MED LIST DOCD IN RCRD: CPT | Mod: HCNC,CPTII,95, | Performed by: INTERNAL MEDICINE

## 2023-07-13 PROCEDURE — 1160F PR REVIEW ALL MEDS BY PRESCRIBER/CLIN PHARMACIST DOCUMENTED: ICD-10-PCS | Mod: HCNC,CPTII,95, | Performed by: INTERNAL MEDICINE

## 2023-07-13 PROCEDURE — 4010F PR ACE/ARB THEARPY RXD/TAKEN: ICD-10-PCS | Mod: HCNC,CPTII,95, | Performed by: INTERNAL MEDICINE

## 2023-07-13 PROCEDURE — 4010F ACE/ARB THERAPY RXD/TAKEN: CPT | Mod: HCNC,CPTII,95, | Performed by: INTERNAL MEDICINE

## 2023-07-13 PROCEDURE — 3044F HG A1C LEVEL LT 7.0%: CPT | Mod: HCNC,CPTII,95, | Performed by: INTERNAL MEDICINE

## 2023-07-13 PROCEDURE — 1159F PR MEDICATION LIST DOCUMENTED IN MEDICAL RECORD: ICD-10-PCS | Mod: HCNC,CPTII,95, | Performed by: INTERNAL MEDICINE

## 2023-07-13 PROCEDURE — 3044F PR MOST RECENT HEMOGLOBIN A1C LEVEL <7.0%: ICD-10-PCS | Mod: HCNC,CPTII,95, | Performed by: INTERNAL MEDICINE

## 2023-07-13 PROCEDURE — 99214 OFFICE O/P EST MOD 30 MIN: CPT | Mod: HCNC,95,, | Performed by: INTERNAL MEDICINE

## 2023-07-13 PROCEDURE — 1160F RVW MEDS BY RX/DR IN RCRD: CPT | Mod: HCNC,CPTII,95, | Performed by: INTERNAL MEDICINE

## 2023-07-13 RX ORDER — OLMESARTAN MEDOXOMIL 40 MG/1
40 TABLET ORAL DAILY
Qty: 90 TABLET | Refills: 0 | Status: SHIPPED | OUTPATIENT
Start: 2023-07-13 | End: 2023-10-13

## 2023-08-14 ENCOUNTER — PATIENT MESSAGE (OUTPATIENT)
Dept: PRIMARY CARE CLINIC | Facility: CLINIC | Age: 63
End: 2023-08-14

## 2023-08-14 ENCOUNTER — CLINICAL SUPPORT (OUTPATIENT)
Dept: PRIMARY CARE CLINIC | Facility: CLINIC | Age: 63
End: 2023-08-14
Payer: MEDICARE

## 2023-08-14 ENCOUNTER — LAB VISIT (OUTPATIENT)
Dept: LAB | Facility: HOSPITAL | Age: 63
End: 2023-08-14
Attending: INTERNAL MEDICINE
Payer: MEDICARE

## 2023-08-14 VITALS — SYSTOLIC BLOOD PRESSURE: 164 MMHG | HEART RATE: 84 BPM | DIASTOLIC BLOOD PRESSURE: 100 MMHG

## 2023-08-14 DIAGNOSIS — I10 HYPERTENSION, UNSPECIFIED TYPE: ICD-10-CM

## 2023-08-14 DIAGNOSIS — I10 ESSENTIAL HYPERTENSION, BENIGN: Primary | ICD-10-CM

## 2023-08-14 LAB
ANION GAP SERPL CALC-SCNC: 12 MMOL/L (ref 8–16)
BUN SERPL-MCNC: 25 MG/DL (ref 8–23)
CALCIUM SERPL-MCNC: 9.5 MG/DL (ref 8.7–10.5)
CHLORIDE SERPL-SCNC: 102 MMOL/L (ref 95–110)
CO2 SERPL-SCNC: 25 MMOL/L (ref 23–29)
CREAT SERPL-MCNC: 1.1 MG/DL (ref 0.5–1.4)
EST. GFR  (NO RACE VARIABLE): 55.8 ML/MIN/1.73 M^2
GLUCOSE SERPL-MCNC: 88 MG/DL (ref 70–110)
POTASSIUM SERPL-SCNC: 4.2 MMOL/L (ref 3.5–5.1)
SODIUM SERPL-SCNC: 139 MMOL/L (ref 136–145)

## 2023-08-14 PROCEDURE — 80048 BASIC METABOLIC PNL TOTAL CA: CPT | Mod: HCNC | Performed by: INTERNAL MEDICINE

## 2023-08-14 PROCEDURE — 99999 PR PBB SHADOW E&M-EST. PATIENT-LVL II: CPT | Mod: PBBFAC,HCNC,,

## 2023-08-14 PROCEDURE — 99999 PR PBB SHADOW E&M-EST. PATIENT-LVL II: ICD-10-PCS | Mod: PBBFAC,HCNC,,

## 2023-08-14 PROCEDURE — 36415 COLL VENOUS BLD VENIPUNCTURE: CPT | Mod: HCNC,PN | Performed by: INTERNAL MEDICINE

## 2023-08-14 RX ORDER — METOPROLOL SUCCINATE 25 MG/1
25 TABLET, EXTENDED RELEASE ORAL DAILY
Qty: 90 TABLET | Refills: 0 | Status: SHIPPED | OUTPATIENT
Start: 2023-08-14 | End: 2023-08-18

## 2023-08-14 NOTE — PROGRESS NOTES
BP is still too high. Cont HCTZ 25 mg and Olmesartan 40 mg.  Will add Toprol 25 mg/d. Since she did not like Amlodipine due to swelling. RTC 2 wks for repeat nurse BP cehck.     Dr. UGARTE

## 2023-08-14 NOTE — PROGRESS NOTES
Patient came in today for a blood pressure check. Her blood pressure in left arm was 156/102 ,pulse 78. Her second reading was 164/100,pulse 84. Patient reports her home readings are also still elevated(145-155/90's).  She is currently on HCTZ 25mg/d and Olmesartan 40mg/d. Pt was prev on Lisinopril 30mg/d which caused a cough and did not like the Amlodipine when she was prev on it due to weight gain and edema.

## 2023-08-15 NOTE — TELEPHONE ENCOUNTER
Since she did not rich Amlodipine and declines the Toprol I rec she sched a fu with Dr. Mary. If BP still elevated then he can recommend another option. Another option would be to change her HCTZ to Chlorthalidone if she is agreeable to this.     Dr. UGARTE

## 2023-08-15 NOTE — PROGRESS NOTES
I sent pt a my chart message -  I reviewed your labs.  Your kidney function remains stable.  It does appear you could increase your hydration.  Your calcium was back to normal.

## 2023-08-18 RX ORDER — CHLORTHALIDONE 25 MG/1
25 TABLET ORAL DAILY
Qty: 90 TABLET | Refills: 0 | Status: SHIPPED | OUTPATIENT
Start: 2023-08-18 | End: 2023-11-07 | Stop reason: SDUPTHER

## 2023-08-18 NOTE — TELEPHONE ENCOUNTER
She declined the Toprol so that is why I am changing the HCTZ to chlorthalidone. Repeat a BMP in 2 wks with nurse BP check at that time.  Dr. UGARTE

## 2023-08-18 NOTE — TELEPHONE ENCOUNTER
No care due was identified.  [facility] Embedded Care Due Messages. Reference number: [id number].   8/18/2023 11:57:55 AM CDT

## 2023-08-24 ENCOUNTER — PATIENT MESSAGE (OUTPATIENT)
Dept: PRIMARY CARE CLINIC | Facility: CLINIC | Age: 63
End: 2023-08-24
Payer: MEDICARE

## 2023-09-11 ENCOUNTER — CLINICAL SUPPORT (OUTPATIENT)
Dept: PRIMARY CARE CLINIC | Facility: CLINIC | Age: 63
End: 2023-09-11
Payer: MEDICARE

## 2023-09-11 ENCOUNTER — LAB VISIT (OUTPATIENT)
Dept: LAB | Facility: HOSPITAL | Age: 63
End: 2023-09-11
Attending: INTERNAL MEDICINE
Payer: MEDICARE

## 2023-09-11 VITALS — HEART RATE: 78 BPM | DIASTOLIC BLOOD PRESSURE: 100 MMHG | SYSTOLIC BLOOD PRESSURE: 148 MMHG

## 2023-09-11 DIAGNOSIS — I16.0 HYPERTENSIVE URGENCY: Primary | ICD-10-CM

## 2023-09-11 DIAGNOSIS — I10 ESSENTIAL HYPERTENSION, BENIGN: ICD-10-CM

## 2023-09-11 LAB
ANION GAP SERPL CALC-SCNC: 15 MMOL/L (ref 8–16)
BUN SERPL-MCNC: 21 MG/DL (ref 8–23)
CALCIUM SERPL-MCNC: 9.6 MG/DL (ref 8.7–10.5)
CHLORIDE SERPL-SCNC: 104 MMOL/L (ref 95–110)
CO2 SERPL-SCNC: 21 MMOL/L (ref 23–29)
CREAT SERPL-MCNC: 0.9 MG/DL (ref 0.5–1.4)
EST. GFR  (NO RACE VARIABLE): >60 ML/MIN/1.73 M^2
GLUCOSE SERPL-MCNC: 102 MG/DL (ref 70–110)
POTASSIUM SERPL-SCNC: 4.1 MMOL/L (ref 3.5–5.1)
SODIUM SERPL-SCNC: 140 MMOL/L (ref 136–145)

## 2023-09-11 PROCEDURE — 36415 COLL VENOUS BLD VENIPUNCTURE: CPT | Mod: HCNC,PN | Performed by: INTERNAL MEDICINE

## 2023-09-11 PROCEDURE — 99999 PR PBB SHADOW E&M-EST. PATIENT-LVL I: CPT | Mod: PBBFAC,HCNC,,

## 2023-09-11 PROCEDURE — 80048 BASIC METABOLIC PNL TOTAL CA: CPT | Mod: HCNC | Performed by: INTERNAL MEDICINE

## 2023-09-11 PROCEDURE — 99999 PR PBB SHADOW E&M-EST. PATIENT-LVL I: ICD-10-PCS | Mod: PBBFAC,HCNC,,

## 2023-09-11 NOTE — PROGRESS NOTES
Patient came in today for a blood pressure check. Her blood pressure in the right arm was 158/96,pulse 85. Second reading was 148/100 pulse Patient is currently on Chlorthalidone 25mg/d and Olmesartan 40mg/d. She does not miss any doses of medication.  Patient does not drink caffeine and does not intake a lot of sodium. Patient states that she went to a football game yesterday and had chips and a few Truly drinks. She reports her home bp is 140ish/90. She did see  earlier in the year regarding her cholesterol and does not have a follow up scheduled. Patient states that acupuncture helps her brother with his bp and she is interested in this. I gave her the information to the elevate clinic at the [place] location that does this.

## 2023-09-11 NOTE — PROGRESS NOTES
Her BP remains uncontrolled on Chlorthalidone 25mg/d and Olmesartan 40mg/d. We could try adding back a low dose of Amlodipine to her regimen to see if this helps since she is on a low dose of Chlorthalidone and this may help with any swelling. Let me know her thoughts. If she does not wish to try amlodipine again another option would be to add a low dose of Toprol 25 g/d. I still await her labs.    Dr. UGARTE

## 2023-09-12 ENCOUNTER — TELEPHONE (OUTPATIENT)
Dept: PRIMARY CARE CLINIC | Facility: CLINIC | Age: 63
End: 2023-09-12
Payer: MEDICARE

## 2023-09-12 NOTE — TELEPHONE ENCOUNTER
----- Message from Haley Wall sent at 9/11/2023  4:50 PM CDT -----  Contact: [phone]  Pt is returning a call she missed from the office. Please call.          Thank you

## 2023-09-12 NOTE — PROGRESS NOTES
I sent pt a my chart message -  I reviewed your labs.  Your kidney function looked better.  IT still appears you could increase your hydration.   Dr. UGARTE

## 2023-09-14 DIAGNOSIS — I10 ESSENTIAL HYPERTENSION, BENIGN: Primary | ICD-10-CM

## 2023-09-18 RX ORDER — AMLODIPINE BESYLATE 2.5 MG/1
2.5 TABLET ORAL DAILY
Qty: 90 TABLET | Refills: 0 | Status: SHIPPED | OUTPATIENT
Start: 2023-09-18 | End: 2023-11-07 | Stop reason: SDUPTHER

## 2023-09-18 RX ORDER — METOPROLOL SUCCINATE 25 MG/1
25 TABLET, EXTENDED RELEASE ORAL DAILY
Qty: 90 TABLET | Refills: 0 | Status: SHIPPED | OUTPATIENT
Start: 2023-09-18 | End: 2023-09-18

## 2023-09-18 NOTE — TELEPHONE ENCOUNTER
Pt did not want to take Metoprolol. She wanted to start Amlodipine. I called [facility] pharmacy and cancelled the Metoprolol. I pended the 2.5mg but unsure if that is the dosage you want

## 2023-09-18 NOTE — TELEPHONE ENCOUNTER
No care due was identified.  [facility] Embedded Care Due Messages. Reference number: [id number].   9/18/2023 11:27:31 AM CDT

## 2023-09-18 NOTE — TELEPHONE ENCOUNTER
Sorry, I thought you had told me she wanted the toprol. Thanks for cancelling. Will send Amlodipine 2.5 mg/d  Dr. UGARTE

## 2023-09-22 DIAGNOSIS — I10 HYPERTENSION, UNSPECIFIED TYPE: ICD-10-CM

## 2023-09-22 RX ORDER — HYDROCHLOROTHIAZIDE 25 MG/1
25 TABLET ORAL DAILY
Qty: 90 TABLET | Refills: 0 | OUTPATIENT
Start: 2023-09-22 | End: 2024-09-21

## 2023-09-22 RX ORDER — AMLODIPINE BESYLATE 10 MG/1
10 TABLET ORAL DAILY
Qty: 90 TABLET | Refills: 0 | OUTPATIENT
Start: 2023-09-22

## 2023-09-22 RX ORDER — LISINOPRIL 30 MG/1
TABLET ORAL
Qty: 90 TABLET | Refills: 0 | OUTPATIENT
Start: 2023-09-22

## 2023-09-22 NOTE — TELEPHONE ENCOUNTER
No care due was identified.  [facility] Embedded Care Due Messages. Reference number: [id number].   9/22/2023 4:12:30 PM CDT

## 2023-09-22 NOTE — TELEPHONE ENCOUNTER
Refill Decision Note   Cary Claudio  is requesting a refill authorization.  Brief Assessment and Rationale for Refill:  Quick Discontinue     Medication Therapy Plan:         Comments:     Note composed:4:41 PM 09/22/2023

## 2023-10-07 DIAGNOSIS — E88.810 METABOLIC SYNDROME: ICD-10-CM

## 2023-10-07 RX ORDER — METFORMIN HYDROCHLORIDE 500 MG/1
TABLET ORAL
Qty: 90 TABLET | Refills: 0 | OUTPATIENT
Start: 2023-10-07

## 2023-10-07 NOTE — TELEPHONE ENCOUNTER
Refill Decision Note   Cary Claudio  is requesting a refill authorization.  Brief Assessment and Rationale for Refill:  Quick Discontinue     Medication Therapy Plan:  Terrence'd (7/13/23); Quick dc      Comments:     Note composed:6:53 PM 10/07/2023

## 2023-10-07 NOTE — TELEPHONE ENCOUNTER
No care due was identified.  [facility] Embedded Care Due Messages. Reference number: [id number].   10/07/2023 8:33:11 AM CDT

## 2023-10-12 DIAGNOSIS — I10 HYPERTENSION, UNSPECIFIED TYPE: ICD-10-CM

## 2023-10-12 RX ORDER — METFORMIN HYDROCHLORIDE 500 MG/1
500 TABLET ORAL
Qty: 90 TABLET | Refills: 0 | OUTPATIENT
Start: 2023-10-12

## 2023-10-12 RX ORDER — AMLODIPINE BESYLATE 10 MG/1
10 TABLET ORAL DAILY
Qty: 90 TABLET | Refills: 0 | OUTPATIENT
Start: 2023-10-12

## 2023-10-12 RX ORDER — HYDROCHLOROTHIAZIDE 25 MG/1
25 TABLET ORAL DAILY
Qty: 90 TABLET | Refills: 0 | OUTPATIENT
Start: 2023-10-12 | End: 2024-10-11

## 2023-10-12 NOTE — TELEPHONE ENCOUNTER
Refill Routing Note   Medication(s) are not appropriate for processing by [facility] for the following reason(s):      Required vitals abnormal    [facility] action(s):  Defer  Quick Discontinue Care Due:  None identified     Medication Therapy Plan: HCTZ dc'd on 8/18/23-Pt now takes chlorthalidone 25mg daily//Amlodipine reduced to 2.5 mg daily on 9/18/23//Metformin dc'd on 7/13/23      Appointments  past 12m or future 3m with PCP    Date Provider   Last Visit   7/13/2023 Nicole Rich MD   Next Visit   12/4/2023 Nicole Rich MD   ED visits in past 90 days: 0        Note composed:5:53 PM 10/12/2023

## 2023-10-12 NOTE — TELEPHONE ENCOUNTER
No care due was identified.  [facility] Embedded Care Due Messages. Reference number: [id number].   10/12/2023 1:23:21 PM CDT

## 2023-10-13 RX ORDER — LISINOPRIL 30 MG/1
TABLET ORAL
Qty: 90 TABLET | Refills: 0 | OUTPATIENT
Start: 2023-10-13

## 2023-10-13 RX ORDER — OLMESARTAN MEDOXOMIL 40 MG/1
40 TABLET ORAL DAILY
Qty: 90 TABLET | Refills: 0 | Status: SHIPPED | OUTPATIENT
Start: 2023-10-13 | End: 2024-01-08 | Stop reason: SDUPTHER

## 2023-11-10 NOTE — PROGRESS NOTES
[facility] Note    Chief Complaint    No chief complaint on file.      History of Present Illness      Cary Claudio is a 63 y.o. female who presents today for No chief complaint on file.        Patient presents to clinic today with reports of bilateral finger/hand pain which started approximately 6 months ago.  She denies any injury or trauma to her hands.  Today's blood pressure is 196/100.  Patient reports she did not take a blood pressure medication this morning and that she ate potato chips and dip last night.  She will be seeing her PCP in 3 days.  I have alerted her PCP on patient's blood pressure and circumstance.  Patient denies any chest pains in any shortness a breath at this time.         Review of Systems   Musculoskeletal:  Positive for joint pain.        + bilateral hand pain   All 12 systems otherwise negative.       Family History:  family history includes Diabetes in her brother; Heart attack (age of onset: 80) in her father; Heart attacks under age 50 (age of onset: 42) in her maternal grandfather; Heart disease (age of onset: 34) in her mother; Heart disease (age of onset: 70) in her father; Hypertension in her mother; Lung cancer in her brother; Rheum arthritis in her sister.   Family history was reviewed with patient.     Medications:  Outpatient Encounter Medications as of 12/1/2023   Medication Sig Dispense Refill    albuterol (PROAIR HFA) 90 mcg/actuation inhaler Inhale 2 puffs into the lungs every 6 (six) hours as needed for Wheezing. Rescue 18 g 0    amLODIPine (NORVASC) 2.5 MG tablet Take 1 tablet (2.5 mg total) by mouth once daily. 90 tablet 0    b complex vitamins tablet Take 1 tablet by mouth once daily.      chlorthalidone (HYGROTEN) 25 MG Tab Take 1 tablet (25 mg total) by mouth once daily. 90 tablet 0    olmesartan (BENICAR) 40 MG tablet TAKE 1 TABLET (40 MG TOTAL) BY MOUTH ONCE DAILY. 90 tablet 0    RESTASIS 0.05 % ophthalmic emulsion Place 1 drop into both eyes  as needed.      triamcinolone (KENALOG) 0.5 % ointment Apply topically 2 (two) times daily. Apply to affected area BID x 2 wks prn rash 15 g 0    vitamin D (VITAMIN D3) 1000 units Tab Take 1,000 Units by mouth once daily.      [DISCONTINUED] amLODIPine (NORVASC) 2.5 MG tablet Take 1 tablet (2.5 mg total) by mouth once daily. 90 tablet 0    [DISCONTINUED] chlorthalidone (HYGROTEN) 25 MG Tab Take 1 tablet (25 mg total) by mouth once daily. 90 tablet 0     No facility-administered encounter medications on file as of 12/1/2023.       Allergies:  Review of patient's allergies indicates:   Allergen Reactions    Compazine [prochlorperazine] Swelling     Tongue swelling    Pcn [penicillins] Swelling     Tongue swelling       Health Maintenance:  Health Maintenance   Topic Date Due    Lipid Panel  04/10/2024    Mammogram  06/02/2024    Colorectal Cancer Screening  11/21/2024    DEXA Scan  06/19/2025    TETANUS VACCINE  05/19/2030    Hepatitis C Screening  Completed    Shingles Vaccine  Completed     Health Maintenance Topics with due status: Not Due       Topic Last Completion Date    Colorectal Cancer Screening 11/21/2014    TETANUS VACCINE 05/19/2020    Hemoglobin A1c (Prediabetes) 04/10/2023    Lipid Panel 04/10/2023    Mammogram 06/02/2023    DEXA Scan 06/19/2023       Physical Exam       ]    Physical Exam  Vitals reviewed.   Constitutional:       Appearance: Normal appearance. She is normal weight.   HENT:      Head: Normocephalic and atraumatic.      Nose: Nose normal.      Mouth/Throat:      Mouth: Mucous membranes are moist.      Pharynx: Oropharynx is clear.   Eyes:      Conjunctiva/sclera: Conjunctivae normal.      Pupils: Pupils are equal, round, and reactive to light.   Cardiovascular:      Rate and Rhythm: Normal rate and regular rhythm.      Pulses: Normal pulses.      Heart sounds: Normal heart sounds.   Pulmonary:      Effort: Pulmonary effort is normal.      Breath sounds: Normal breath sounds.    Musculoskeletal:         General: Normal range of motion.      Cervical back: Normal range of motion and neck supple.   Skin:     General: Skin is warm and dry.      Capillary Refill: Capillary refill takes less than 2 seconds.   Neurological:      General: No focal deficit present.      Mental Status: She is alert and oriented to person, place, and time. Mental status is at baseline.   Psychiatric:         Mood and Affect: Mood normal.         Behavior: Behavior normal.         Thought Content: Thought content normal.         Judgment: Judgment normal.            Assessment/Plan     Cary Claudio is a 63 y.o.female with:    There are no diagnoses linked to this encounter.    As above, continue current medications and maintain follow up with specialists.  Return to clinic as needed.    Greater than 50% of visit was spent face to face with patient.  All questions were answered to patient's satisfaction.          Karen L Spencer, NP-C [facility]                Answers submitted by the patient for this visit:  Review of Systems Questionnaire (Submitted on 12/1/2023)  activity change: No  hearing loss: No  trouble swallowing: No  eye discharge: No  chest tightness: No  wheezing: No  chest pain: No  palpitations: No  constipation: No  vomiting: No  diarrhea: No  difficulty urinating: No  hematuria: No  joint swelling: Yes  arthralgias: Yes  headaches: No  dysphoric mood: No

## 2023-12-01 ENCOUNTER — OFFICE VISIT (OUTPATIENT)
Dept: PRIMARY CARE CLINIC | Facility: CLINIC | Age: 63
End: 2023-12-01
Payer: MEDICARE

## 2023-12-01 ENCOUNTER — HOSPITAL ENCOUNTER (OUTPATIENT)
Dept: RADIOLOGY | Facility: HOSPITAL | Age: 63
Discharge: HOME | End: 2023-12-01
Attending: NURSE PRACTITIONER
Payer: MEDICARE

## 2023-12-01 VITALS
DIASTOLIC BLOOD PRESSURE: 100 MMHG | RESPIRATION RATE: 16 BRPM | OXYGEN SATURATION: 98 % | BODY MASS INDEX: 28.9 KG/M2 | SYSTOLIC BLOOD PRESSURE: 196 MMHG | WEIGHT: 163.13 LBS | HEART RATE: 80 BPM | HEIGHT: 63 IN

## 2023-12-01 DIAGNOSIS — M79.641 PAIN IN BOTH HANDS: Primary | ICD-10-CM

## 2023-12-01 DIAGNOSIS — M79.642 PAIN IN BOTH HANDS: Primary | ICD-10-CM

## 2023-12-01 DIAGNOSIS — M79.642 PAIN IN BOTH HANDS: ICD-10-CM

## 2023-12-01 DIAGNOSIS — M79.641 PAIN IN BOTH HANDS: ICD-10-CM

## 2023-12-01 PROCEDURE — 73130 X-RAY EXAM OF HAND: CPT | Mod: TC,50,HCNC,PN

## 2023-12-01 PROCEDURE — 73130 X-RAY EXAM OF HAND: CPT | Mod: 26,50,HCNC, | Performed by: RADIOLOGY

## 2023-12-01 PROCEDURE — 99999 PR PBB SHADOW E&M-EST. PATIENT-LVL V: ICD-10-PCS | Mod: PBBFAC,HCNC,, | Performed by: NURSE PRACTITIONER

## 2023-12-01 PROCEDURE — 4010F PR ACE/ARB THEARPY RXD/TAKEN: ICD-10-PCS | Mod: HCNC,CPTII,S$GLB, | Performed by: NURSE PRACTITIONER

## 2023-12-01 PROCEDURE — 3044F PR MOST RECENT HEMOGLOBIN A1C LEVEL <7.0%: ICD-10-PCS | Mod: HCNC,CPTII,S$GLB, | Performed by: NURSE PRACTITIONER

## 2023-12-01 PROCEDURE — 3077F PR MOST RECENT SYSTOLIC BLOOD PRESSURE >= 140 MM HG: ICD-10-PCS | Mod: HCNC,CPTII,S$GLB, | Performed by: NURSE PRACTITIONER

## 2023-12-01 PROCEDURE — 1101F PR PT FALLS ASSESS DOC 0-1 FALLS W/OUT INJ PAST YR: ICD-10-PCS | Mod: HCNC,CPTII,S$GLB, | Performed by: NURSE PRACTITIONER

## 2023-12-01 PROCEDURE — 4010F ACE/ARB THERAPY RXD/TAKEN: CPT | Mod: HCNC,CPTII,S$GLB, | Performed by: NURSE PRACTITIONER

## 2023-12-01 PROCEDURE — 3288F PR FALLS RISK ASSESSMENT DOCUMENTED: ICD-10-PCS | Mod: HCNC,CPTII,S$GLB, | Performed by: NURSE PRACTITIONER

## 2023-12-01 PROCEDURE — 99999 PR PBB SHADOW E&M-EST. PATIENT-LVL V: CPT | Mod: PBBFAC,HCNC,, | Performed by: NURSE PRACTITIONER

## 2023-12-01 PROCEDURE — 99213 PR OFFICE/OUTPT VISIT, EST, LEVL III, 20-29 MIN: ICD-10-PCS | Mod: HCNC,S$GLB,, | Performed by: NURSE PRACTITIONER

## 2023-12-01 PROCEDURE — 1160F PR REVIEW ALL MEDS BY PRESCRIBER/CLIN PHARMACIST DOCUMENTED: ICD-10-PCS | Mod: HCNC,CPTII,S$GLB, | Performed by: NURSE PRACTITIONER

## 2023-12-01 PROCEDURE — 99213 OFFICE O/P EST LOW 20 MIN: CPT | Mod: HCNC,S$GLB,, | Performed by: NURSE PRACTITIONER

## 2023-12-01 PROCEDURE — 3044F HG A1C LEVEL LT 7.0%: CPT | Mod: HCNC,CPTII,S$GLB, | Performed by: NURSE PRACTITIONER

## 2023-12-01 PROCEDURE — 73130 XR HAND COMPLETE 3 VIEWS BILATERAL: ICD-10-PCS | Mod: 26,50,HCNC, | Performed by: RADIOLOGY

## 2023-12-01 PROCEDURE — 3288F FALL RISK ASSESSMENT DOCD: CPT | Mod: HCNC,CPTII,S$GLB, | Performed by: NURSE PRACTITIONER

## 2023-12-01 PROCEDURE — 1159F MED LIST DOCD IN RCRD: CPT | Mod: HCNC,CPTII,S$GLB, | Performed by: NURSE PRACTITIONER

## 2023-12-01 PROCEDURE — 3077F SYST BP >= 140 MM HG: CPT | Mod: HCNC,CPTII,S$GLB, | Performed by: NURSE PRACTITIONER

## 2023-12-01 PROCEDURE — 3080F DIAST BP >= 90 MM HG: CPT | Mod: HCNC,CPTII,S$GLB, | Performed by: NURSE PRACTITIONER

## 2023-12-01 PROCEDURE — 1160F RVW MEDS BY RX/DR IN RCRD: CPT | Mod: HCNC,CPTII,S$GLB, | Performed by: NURSE PRACTITIONER

## 2023-12-01 PROCEDURE — 3008F PR BODY MASS INDEX (BMI) DOCUMENTED: ICD-10-PCS | Mod: HCNC,CPTII,S$GLB, | Performed by: NURSE PRACTITIONER

## 2023-12-01 PROCEDURE — 1159F PR MEDICATION LIST DOCUMENTED IN MEDICAL RECORD: ICD-10-PCS | Mod: HCNC,CPTII,S$GLB, | Performed by: NURSE PRACTITIONER

## 2023-12-01 PROCEDURE — 3008F BODY MASS INDEX DOCD: CPT | Mod: HCNC,CPTII,S$GLB, | Performed by: NURSE PRACTITIONER

## 2023-12-01 PROCEDURE — 1101F PT FALLS ASSESS-DOCD LE1/YR: CPT | Mod: HCNC,CPTII,S$GLB, | Performed by: NURSE PRACTITIONER

## 2023-12-01 PROCEDURE — 3080F PR MOST RECENT DIASTOLIC BLOOD PRESSURE >= 90 MM HG: ICD-10-PCS | Mod: HCNC,CPTII,S$GLB, | Performed by: NURSE PRACTITIONER

## 2023-12-01 RX ORDER — METOPROLOL SUCCINATE 25 MG/1
25 TABLET, EXTENDED RELEASE ORAL
COMMUNITY
Start: 2023-10-28 | End: 2024-02-28

## 2023-12-05 ENCOUNTER — TELEPHONE (OUTPATIENT)
Dept: PHARMACY | Facility: CLINIC | Age: 63
End: 2023-12-05
Payer: MEDICARE

## 2023-12-05 NOTE — TELEPHONE ENCOUNTER
Assessed patient medication adherence for population health /[facility] [place] medication adherence project

## 2024-01-08 ENCOUNTER — PATIENT MESSAGE (OUTPATIENT)
Dept: PRIMARY CARE CLINIC | Facility: CLINIC | Age: 64
End: 2024-01-08
Payer: MEDICARE

## 2024-01-25 DIAGNOSIS — I10 ESSENTIAL HYPERTENSION, BENIGN: Primary | ICD-10-CM

## 2024-01-25 NOTE — TELEPHONE ENCOUNTER
No care due was identified.  [facility] Embedded Care Due Messages. Reference number: [id number].   1/25/2024 12:05:47 PM CST

## 2024-01-25 NOTE — TELEPHONE ENCOUNTER
I see on a phone note from 9/18/23 that pt would rather try Amlodipne 2.5mg instead of trying Toprol. However I see Metoprolol was added to her med list as historical on 10/28/23. I left a vm requesting she contact the office so I can confirm for sure what she is taking.

## 2024-01-25 NOTE — TELEPHONE ENCOUNTER
Refill Routing Note   Medication(s) are not appropriate for processing by [facility] for the following reason(s):        Required vitals abnormal  No active prescription written by provider    [facility] action(s):  Defer               Appointments  past 12m or future 3m with PCP    Date Provider   Last Visit   7/13/2023 Nicole Rich MD   Next Visit   2/1/2024 Nicole Rich MD   ED visits in past 90 days: 0        Note composed:12:29 PM 01/25/2024

## 2024-01-25 NOTE — TELEPHONE ENCOUNTER
Can you see if pt is taking Toprol. I thought she declines it but I just got a refill request  Dr UGARTE

## 2024-01-26 ENCOUNTER — PATIENT MESSAGE (OUTPATIENT)
Dept: ORTHOPEDICS | Facility: CLINIC | Age: 64
End: 2024-01-26
Payer: MEDICARE

## 2024-01-26 DIAGNOSIS — M79.642 BILATERAL HAND PAIN: Primary | ICD-10-CM

## 2024-01-26 DIAGNOSIS — M79.641 BILATERAL HAND PAIN: Primary | ICD-10-CM

## 2024-01-26 RX ORDER — METOPROLOL SUCCINATE 25 MG/1
25 TABLET, EXTENDED RELEASE ORAL DAILY
Qty: 90 TABLET | Refills: 1 | OUTPATIENT
Start: 2024-01-26

## 2024-01-26 RX ORDER — METOPROLOL SUCCINATE 25 MG/1
25 TABLET, EXTENDED RELEASE ORAL
Qty: 90 TABLET | Refills: 1 | OUTPATIENT
Start: 2024-01-26

## 2024-01-26 NOTE — TELEPHONE ENCOUNTER
I sw pt. She does not take Metoprolol. Reports she takes Amlodipine 2.5mg/d,Chlorthalidone 25mg/d and Olmesartan 40mg/d

## 2024-01-26 NOTE — TELEPHONE ENCOUNTER
Refill Decision Note   Cary Claudio  is requesting a refill authorization.  Brief Assessment and Rationale for Refill:  Quick Discontinue     Medication Therapy Plan:       Medication Reconciliation Completed: No   Comments:     No Care Gaps recommended.     Note composed:4:04 PM 01/26/2024

## 2024-01-27 ENCOUNTER — PATIENT MESSAGE (OUTPATIENT)
Dept: PRIMARY CARE CLINIC | Facility: CLINIC | Age: 64
End: 2024-01-27
Payer: MEDICARE

## 2024-01-29 ENCOUNTER — OFFICE VISIT (OUTPATIENT)
Dept: ORTHOPEDICS | Facility: CLINIC | Age: 64
End: 2024-01-29
Payer: MEDICARE

## 2024-01-29 ENCOUNTER — LAB VISIT (OUTPATIENT)
Dept: LAB | Facility: HOSPITAL | Age: 64
End: 2024-01-29
Attending: ORTHOPAEDIC SURGERY
Payer: MEDICARE

## 2024-01-29 VITALS — HEIGHT: 63 IN | WEIGHT: 163 LBS | BODY MASS INDEX: 28.88 KG/M2

## 2024-01-29 DIAGNOSIS — M18.11 PRIMARY OSTEOARTHRITIS OF FIRST CARPOMETACARPAL JOINT OF RIGHT HAND: Primary | ICD-10-CM

## 2024-01-29 DIAGNOSIS — M79.642 PAIN IN BOTH HANDS: ICD-10-CM

## 2024-01-29 DIAGNOSIS — G56.01 CARPAL TUNNEL SYNDROME OF RIGHT WRIST: ICD-10-CM

## 2024-01-29 DIAGNOSIS — M79.641 PAIN IN BOTH HANDS: ICD-10-CM

## 2024-01-29 DIAGNOSIS — M18.12 PRIMARY OSTEOARTHRITIS OF FIRST CARPOMETACARPAL JOINT OF LEFT HAND: ICD-10-CM

## 2024-01-29 DIAGNOSIS — G56.02 CARPAL TUNNEL SYNDROME OF LEFT WRIST: ICD-10-CM

## 2024-01-29 DIAGNOSIS — M25.50 PAIN IN JOINT, MULTIPLE SITES: ICD-10-CM

## 2024-01-29 LAB
BASOPHILS # BLD AUTO: 0.06 K/UL (ref 0–0.2)
BASOPHILS NFR BLD: 0.8 % (ref 0–1.9)
CCP AB SER IA-ACNC: 0.6 U/ML
CRP SERPL-MCNC: 5.3 MG/L (ref 0–8.2)
DIFFERENTIAL METHOD BLD: NORMAL
EOSINOPHIL # BLD AUTO: 0.1 K/UL (ref 0–0.5)
EOSINOPHIL NFR BLD: 1.7 % (ref 0–8)
ERYTHROCYTE [DISTWIDTH] IN BLOOD BY AUTOMATED COUNT: 12.3 % (ref 11.5–14.5)
ERYTHROCYTE [SEDIMENTATION RATE] IN BLOOD BY PHOTOMETRIC METHOD: 11 MM/HR (ref 0–36)
HCT VFR BLD AUTO: 41.5 % (ref 37–48.5)
HGB BLD-MCNC: 13.7 G/DL (ref 12–16)
IMM GRANULOCYTES # BLD AUTO: 0.01 K/UL (ref 0–0.04)
IMM GRANULOCYTES NFR BLD AUTO: 0.1 % (ref 0–0.5)
LYMPHOCYTES # BLD AUTO: 2.2 K/UL (ref 1–4.8)
LYMPHOCYTES NFR BLD: 28.2 % (ref 18–48)
MCH RBC QN AUTO: 28.9 PG (ref 27–31)
MCHC RBC AUTO-ENTMCNC: 33 G/DL (ref 32–36)
MCV RBC AUTO: 88 FL (ref 82–98)
MONOCYTES # BLD AUTO: 0.7 K/UL (ref 0.3–1)
MONOCYTES NFR BLD: 9.6 % (ref 4–15)
NEUTROPHILS # BLD AUTO: 4.6 K/UL (ref 1.8–7.7)
NEUTROPHILS NFR BLD: 59.6 % (ref 38–73)
NRBC BLD-RTO: 0 /100 WBC
PLATELET # BLD AUTO: 277 K/UL (ref 150–450)
PMV BLD AUTO: 12.1 FL (ref 9.2–12.9)
RBC # BLD AUTO: 4.74 M/UL (ref 4–5.4)
WBC # BLD AUTO: 7.73 K/UL (ref 3.9–12.7)

## 2024-01-29 PROCEDURE — 1125F AMNT PAIN NOTED PAIN PRSNT: CPT | Mod: HCNC,CPTII,S$GLB, | Performed by: ORTHOPAEDIC SURGERY

## 2024-01-29 PROCEDURE — 36415 COLL VENOUS BLD VENIPUNCTURE: CPT | Mod: HCNC,PO | Performed by: ORTHOPAEDIC SURGERY

## 2024-01-29 PROCEDURE — 99999 PR PBB SHADOW E&M-EST. PATIENT-LVL III: CPT | Mod: PBBFAC,HCNC,, | Performed by: ORTHOPAEDIC SURGERY

## 2024-01-29 PROCEDURE — 86431 RHEUMATOID FACTOR QUANT: CPT | Mod: HCNC | Performed by: ORTHOPAEDIC SURGERY

## 2024-01-29 PROCEDURE — 86140 C-REACTIVE PROTEIN: CPT | Mod: HCNC | Performed by: ORTHOPAEDIC SURGERY

## 2024-01-29 PROCEDURE — 1101F PT FALLS ASSESS-DOCD LE1/YR: CPT | Mod: HCNC,CPTII,S$GLB, | Performed by: ORTHOPAEDIC SURGERY

## 2024-01-29 PROCEDURE — 86200 CCP ANTIBODY: CPT | Mod: HCNC | Performed by: ORTHOPAEDIC SURGERY

## 2024-01-29 PROCEDURE — 1160F RVW MEDS BY RX/DR IN RCRD: CPT | Mod: HCNC,CPTII,S$GLB, | Performed by: ORTHOPAEDIC SURGERY

## 2024-01-29 PROCEDURE — 4010F ACE/ARB THERAPY RXD/TAKEN: CPT | Mod: HCNC,CPTII,S$GLB, | Performed by: ORTHOPAEDIC SURGERY

## 2024-01-29 PROCEDURE — 85025 COMPLETE CBC W/AUTO DIFF WBC: CPT | Mod: HCNC | Performed by: ORTHOPAEDIC SURGERY

## 2024-01-29 PROCEDURE — 85652 RBC SED RATE AUTOMATED: CPT | Mod: HCNC | Performed by: ORTHOPAEDIC SURGERY

## 2024-01-29 PROCEDURE — 3288F FALL RISK ASSESSMENT DOCD: CPT | Mod: HCNC,CPTII,S$GLB, | Performed by: ORTHOPAEDIC SURGERY

## 2024-01-29 PROCEDURE — 3008F BODY MASS INDEX DOCD: CPT | Mod: HCNC,CPTII,S$GLB, | Performed by: ORTHOPAEDIC SURGERY

## 2024-01-29 PROCEDURE — 86038 ANTINUCLEAR ANTIBODIES: CPT | Mod: HCNC | Performed by: ORTHOPAEDIC SURGERY

## 2024-01-29 PROCEDURE — 99204 OFFICE O/P NEW MOD 45 MIN: CPT | Mod: HCNC,S$GLB,, | Performed by: ORTHOPAEDIC SURGERY

## 2024-01-29 PROCEDURE — 1159F MED LIST DOCD IN RCRD: CPT | Mod: HCNC,CPTII,S$GLB, | Performed by: ORTHOPAEDIC SURGERY

## 2024-01-29 NOTE — PROGRESS NOTES
[facility]  History & Physical  Orthopedics    SUBJECTIVE:        Chief Complaint:   Chief Complaint   Patient presents with    Right Hand - Pain, Swelling    Left Hand - Pain       Referring Provider: Roselia Mary NP     History of Present Illness:  Patient is a 63 y.o. right hand dominant female who presents today with complaints of Right hand > left hand pain that began 6 months ago and got worse in December 2023. She states that her right hand was the worst and it was swollen and painful. Patient states that the symptoms have decreased, but her right hand continues to bother her mostly at night with numbness. She also reports right basilar thumb pain with pinching and gripping and feels that her  is weaker.  She also She denies any cervical pain, any injections or bracing. She does report generalized pain in multiple joints including her foot.    The patient is a/an retired, works prn doing inventory.    Family history + RA - sister and son     The patient denies any fevers, chills, N/V, D/C and presents for evaluation.       Past Medical History:   Diagnosis Date    Anemia     Cardiac anomaly, congenital     pt had a repair for ?ASD '98 at [facility] with Dr. Aguilar    Congenital heart anomaly     Fibroids     uterine     History of migraine     Hyperlipidemia     Hypertension     Insomnia     Nicotine dependence in remission     PUD (peptic ulcer disease)      Past Surgical History:   Procedure Laterality Date    HYSTERECTOMY  2014    repair of congenital heart disease  1998    Repair of ASD     Review of patient's allergies indicates:   Allergen Reactions    Compazine [prochlorperazine] Swelling     Tongue swelling    Pcn [penicillins] Swelling     Tongue swelling     Social History     Social History Narrative    Not on file     Family History   Problem Relation Age of Onset    Heart disease Mother 34    Hypertension Mother     Heart disease Father 70    Heart attack Father 80  "   Rheum arthritis Sister     Diabetes Brother     Lung cancer Brother         mets to brain    Heart attacks under age 50 Maternal Grandfather 42    Breast cancer Neg Hx     Colon cancer Neg Hx     Ovarian cancer Neg Hx     Stroke Neg Hx          Current Outpatient Medications:     albuterol (PROAIR HFA) 90 mcg/actuation inhaler, Inhale 2 puffs into the lungs every 6 (six) hours as needed for Wheezing. Rescue, Disp: 18 g, Rfl: 0    amLODIPine (NORVASC) 2.5 MG tablet, Take 1 tablet (2.5 mg total) by mouth once daily., Disp: 90 tablet, Rfl: 0    b complex vitamins tablet, Take 1 tablet by mouth once daily., Disp: , Rfl:     chlorthalidone (HYGROTEN) 25 MG Tab, Take 1 tablet (25 mg total) by mouth once daily., Disp: 90 tablet, Rfl: 0    metoprolol succinate (TOPROL-XL) 25 MG 24 hr tablet, Take 25 mg by mouth., Disp: , Rfl:     olmesartan (BENICAR) 40 MG tablet, Take 1 tablet (40 mg total) by mouth once daily., Disp: 90 tablet, Rfl: 0    RESTASIS 0.05 % ophthalmic emulsion, Place 1 drop into both eyes as needed., Disp: , Rfl:     triamcinolone (KENALOG) 0.5 % ointment, Apply topically 2 (two) times daily. Apply to affected area BID x 2 wks prn rash, Disp: 15 g, Rfl: 0    vitamin D (VITAMIN D3) 1000 units Tab, Take 1,000 Units by mouth once daily., Disp: , Rfl:     ROS    Review of Systems:  Constitutional: no fever or chills  Eyes: no visual changes  ENT: no nasal congestion or sore throat  Respiratory: no cough or shortness of breath  Cardiovascular: no chest pain  Gastrointestinal: no nausea or vomiting, tolerating diet  Musculoskeletal: pain, soreness, and numbness/tingling    OBJECTIVE:      Vital Signs (Most Recent):  Vitals:    01/29/24 1344   Weight: 73.9 kg (163 lb)   Height: 5' 3" (1.6 m)     Body mass index is 28.87 kg/m².    Physical Exam    Physical Exam:  Constitutional: The patient appears well-developed and well-nourished. No distress.   Head: Normocephalic and atraumatic.   Nose: Nose normal.   Eyes: " Conjunctivae and EOM are normal.   Neck: No tracheal deviation present.   Cardiovascular: Normal rate and intact distal pulses.    Pulmonary/Chest: Effort normal. No respiratory distress.   Abdominal: There is no guarding.   Lymphatic: Negative for adenopathy   Neurological: The patient is alert.   Psychiatric: The patient has a normal mood and affect.     Bilateral Hand/Wrist Examination:    Observation/Inspection:  Swelling  none    Deformity  none  Discoloration  none     Scars   none    Atrophy  none    HAND/WRIST EXAMINATION:    + grind test right basilar thumb joint; +Compression, + Tinel's and Phalen's bilateral wrists  ROM hand/wrist/elbow full    Neurovascular Exam:  Digits WWP, brisk CR < 3s throughout  NVI motor/LTS to M/R/U nerves, radial pulse 2+  2+ biceps and brachioradialis reflexes    Diagnostic Results:     X-rays AP, lateral and oblique bilateral hands taken today are independently reviewed by me and show Eaton stage II basilar thumb  and IP joint arthritis.     ASSESSMENT/PLAN:      63 y.o. yo female with   Encounter Diagnoses   Name Primary?    Pain in both hands     Carpal tunnel syndrome of left wrist     Carpal tunnel syndrome of right wrist     Pain in joint, multiple sites     Primary osteoarthritis of first carpometacarpal joint of right hand Yes    Primary osteoarthritis of first carpometacarpal joint of left hand       Plan:  We have discussed the natural history of carpal tunnel and basilar thumb arthritis including treatment options such as splinting, oral and topical anti-inflammatories, cortisone injections and surgery. I have given a right thumb 3D brace and night wrist carpal tunnel braces for comfort. I have ordered an autoimmune panel due to her family history and multiple joint pains. I have ordered an EMG/NCS.    Follow up after EMG/NCS.      The patient's pathophysiology was explained in detail with reference to x-rays, models, other visual aids as appropriate.  Treatment  options were discussed in detail.  Questions were invited and answered to the patient's satisfaction. I reviewed Primary care , and other specialty's notes to better coordinate patient's care.          Francisca Moran MD    Please be aware that this note has been generated with the assistance of MModal voice-to-text.  Please excuse any spelling or grammatical errors.

## 2024-01-30 LAB — RHEUMATOID FACT SERPL-ACNC: <13 IU/ML (ref 0–15)

## 2024-01-30 PROCEDURE — 81374 HLA I TYPING 1 ANTIGEN LR: CPT | Mod: PO | Performed by: ORTHOPAEDIC SURGERY

## 2024-01-31 LAB — ANA SER QL IF: NORMAL

## 2024-02-14 NOTE — TELEPHONE ENCOUNTER
Thanks for letting me know. IHSS can run in families. Her prev EKG and echo did not look compatible with this but she should alert Dr. Mary. I will add to her chart  Dr. UGARTE

## 2024-02-23 LAB
HLA B27 INTERPRETATION: NORMAL
HLA-B27 RELATED AG QL: NEGATIVE
HLA-B27 RELATED AG QL: NORMAL

## 2024-02-25 NOTE — PROGRESS NOTES
[facility] Note    Chief Complaint      Chief Complaint   Patient presents with    Follow-up       History of Present Illness      Cary Claudio is a 64 y.o.  HTN, Anemia, Duodenal ulcer presents to fu well visit. Last visit - 7/13/23    [facility] h/o IHSS - IHSS can run in families. Her previous EKG and echo did not look compatible with this but she should alert Dr. Mary.      Hypercalcemia - resolved. Pt not currently  on Calcium suppl. Cont to monitot.      Syncope - [place] and was standing for a prolonged period.  EMS checked her and BP was low. She felt she was going to pass out and went to sit down and lost consciousness for 30 sec.  She recalls not feeling that well earlier that AM and had a HA. No further episodes. She did not go to ER because she felt better right away.  She had h/o Prev Vasovagal response when donating blood > 20 yrs ago. Note she she had a prev ASD repair '98.  Echo - 8/5/22- EF 68%, Trace TR, Trace pulmonic Regurg.  EKG - 7/15/22 - NSR.  Fu by Dr. Usman Christie. CT calcium score - 5/1/23 - 0     HLD -     HDL 67 - 4/10/23.The 10-year ASCVD risk score (Risk of having a cardiovascular event within 10 yrs) is: 8.8%. We could consider starting her on a statin. I would like to start her on Atorvastatin 20 mg daily. She declined so referred her to [facility] for second opinion given recent syncopal episode and [facility] h/o CAD.  Fu by Dr. Usman Christie. CT calcium score - 5/1/23 - 0     HTN - Pt uncontrolled on Norvasc 2.5 mg/d, Chlorthalidone 25 mg/d, Olmesartan 40 mg/d. Rare swelling.  Rec low sodium diet.  Tried to add Toprol 25 mg/d since she did not like Amlodipine due to swelling but she declined the Toprol so that is why I changed the HCTZ to chlorthalidone.   BP remained uncontrolled on Chlorthalidone 25mg/d and Olmesartan 40mg/d. We added back a low dose of Amlodipine to her regimen to see if this helps since she is  on a low dose of Chlorthalidone and this may help with any swelling.  Note - potential ASD repair - inter-atrial septum does appear thickened and hyperechoic which could represent repair. No flow by color doppler. No evidence of significant RV dilation or dysfunction.    Echo - 8/5/22- EF 68%, Trace TR, Trace pulmonic Regurg.   She reports home BP - 140's. She reports dec ankle swelling since dec amlodipine. Cough resolved with changing from Lisinopril to Olmesartan.      H/o Duodenal ulcer - No longer on Omeprazole. Rec Avoid NSAIDS. ok for tylenol prn.     Osteoporosis - I rec  Vitamin D supplements. Avoid Calcium due to prev elev Ca - 10.6.  In addition, I rec weight-bearing  exercise at least 30 minutes 3 times/wk and ideally 5 times/wk.  No specific intensity.  Walking is fine. I just rec you pick a form of weight-bearing exercise you enjoy to facilitate long term compliance and benefit. I also rec  you consider starting therapy to strengthen your bones such as Alendronate 70 mg by mouth weekly vs Prolia which is a Q 6 month Injection.  Alendronate can cause GI upset and poss Osteonecrosis of the jaw. I would not rec if you are planning dental work. I would likely not rec Alendronate or oral bisphosphonates given h/o Duodenal ulcer. We can repeat  DEXA in 2yrs     Overweight  BMI - 28.51. Up 4 b since Oct.. Pt walks most days. She plays tennis intermittently. She lost 25 lb prev on Metformin.   Pt was started on Metformin 1 per OB for wt loss but didn't so stopped it.  Ha1c - 5.0. Repeat Ha1c and if elevated sconsider resuming Metformin.      HCM - Flu - 11/2023 per pt;  Tdap - 5/19/20;  PCV 20 - 4/10/23; PCV 13 - none;  PVX 23 - none;  Shingrix - #1 - 6/28/21 and #2 - 10/27/21; Zostavax - 10/8/14;  COVID - 19 Vaccine (J&J) - 3/10/21; #2 - declines;  MGM - 6/2/23- repeat 1 yr;   Left breast U/S 6/19/20 - benign simple cyst - repeat MGM in 1 yr   - has order; DEXA - 6/19/23 - + Osteoporosis;  PAP - 6/1/20 - neg;  pt s/p partial Hys; Hep C Screen -5/19/20 neg;  HIV Screen - 5/19/20- neg; C-scope - 11/21/14 - repeat 10 yrs; Prev PCP - Dr. Rutledge; Ophtho - Dr. Martinez; Ob/GYN - Dr. Borges; well visit - 6/28/21     Patient Care Team:  Nicole Rich MD as PCP - General (Internal Medicine)  Florecita Cochran MA as Care Coordinator     Health Maintenance:  Immunization History   Administered Date(s) Administered    COVID-19, vector-nr, rS-Ad26, PF (CRMnext) 03/10/2021    Influenza - Quadrivalent 10/08/2014    Influenza - Quadrivalent - PF *Preferred* (6 months and older) 02/15/2022    Pneumococcal Conjugate - 20 Valent 04/10/2023    Tdap 05/19/2020    Zoster 10/08/2014    Zoster Recombinant 06/28/2021, 10/27/2021      Health Maintenance   Topic Date Due    Lipid Panel  04/10/2024    Mammogram  06/02/2024    Colorectal Cancer Screening  11/21/2024    DEXA Scan  06/19/2025    TETANUS VACCINE  05/19/2030    Hepatitis C Screening  Completed    Shingles Vaccine  Completed        Past Medical History:  Past Medical History:   Diagnosis Date    Anemia     Cardiac anomaly, congenital     pt had a repair for ?ASD '98 at [facility] with Dr. Aguilar    Congenital heart anomaly     Fibroids     uterine     History of migraine     Hyperlipidemia     Hypertension     Insomnia     Nicotine dependence in remission     PUD (peptic ulcer disease)        Past Surgical History:   has a past surgical history that includes repair of congenital heart disease (1998); Hysterectomy (2014); Eye surgery; and Cardiac valve replacement (Hole in heart surgery 1998).    Family History:  family history includes Arthritis in her sister; Diabetes in her brother and brother; Heart attack (age of onset: 80) in her father; Heart attacks under age 50 (age of onset: 42) in her maternal grandfather; Heart disease (age of onset: 34) in her mother; Heart disease (age of onset: 70) in her father; Hypertension in her mother; Hypertrophic cardiomyopathy in her mother;  Lung cancer in her brother; Rheum arthritis in her sister.     Social History:  Social History     Tobacco Use    Smoking status: Former     Current packs/day: 0.00     Types: Cigarettes     Quit date: 2004     Years since quittin.6    Smokeless tobacco: Never    Tobacco comments:     Did not smoke that much. Never now   Substance Use Topics    Alcohol use: Yes     Alcohol/week: 2.0 standard drinks of alcohol     Types: 2 Glasses of wine per week     Comment: rare    Drug use: No       Review of Systems   Constitutional:  Negative for chills, diaphoresis and fever.   HENT:  Negative for hearing loss.    Eyes:  Positive for visual disturbance.        Planning to sched fu with Ophtho   Respiratory:  Negative for chest tightness and shortness of breath.    Cardiovascular:  Positive for leg swelling. Negative for chest pain and palpitations.        Minor and comes and goes.    Gastrointestinal:  Negative for abdominal pain, constipation, diarrhea, nausea and vomiting.   Endocrine: Negative for cold intolerance, heat intolerance and polydipsia.   Genitourinary:  Negative for dysuria and frequency.   Musculoskeletal:  Positive for arthralgias.        Hands and feet   Neurological:  Positive for headaches. Negative for dizziness.        Rare   Psychiatric/Behavioral:  Negative for dysphoric mood. The patient is not nervous/anxious.         Medications:    Current Outpatient Medications:     amLODIPine (NORVASC) 2.5 MG tablet, Take 1 tablet (2.5 mg total) by mouth once daily., Disp: 90 tablet, Rfl: 0    b complex vitamins tablet, Take 1 tablet by mouth once daily., Disp: , Rfl:     RESTASIS 0.05 % ophthalmic emulsion, Place 1 drop into both eyes as needed., Disp: , Rfl:     triamcinolone (KENALOG) 0.5 % ointment, Apply topically 2 (two) times daily. Apply to affected area BID x 2 wks prn rash, Disp: 15 g, Rfl: 0    vitamin D (VITAMIN D3) 1000 units Tab, Take 1,000 Units by mouth once daily., Disp: , Rfl:     ZINC  "ORAL, Take by mouth Daily., Disp: , Rfl:     chlorthalidone (HYGROTEN) 50 MG Tab, Take 1 tablet (50 mg total) by mouth once daily., Disp: 90 tablet, Rfl: 0    olmesartan (BENICAR) 40 MG tablet, Take 1 tablet (40 mg total) by mouth once daily., Disp: 90 tablet, Rfl: 0     Allergies:  Review of patient's allergies indicates:   Allergen Reactions    Compazine [prochlorperazine] Swelling     Tongue swelling    Pcn [penicillins] Swelling     Tongue swelling    Lisinopril Other (See Comments)     cough       Physical Exam      Vital Signs  Temp: 98.2 °F (36.8 °C)  Temp Source: Oral  Pulse: 89  Resp: 18  SpO2: 98 %  BP: (!) (P) 170/98  BP Location: (P) Left arm  Patient Position: (P) Sitting  Pain Score: 0-No pain  Height and Weight  Height: 5' 3" (160 cm)  Weight: 73 kg (160 lb 15 oz)  BSA (Calculated - sq m): 1.8 sq meters  BMI (Calculated): 28.5  Weight in (lb) to have BMI = 25: 140.8      Patient Position: (P) Sitting      Physical Exam  Vitals reviewed.   Constitutional:       General: She is not in acute distress.     Appearance: Normal appearance. She is not ill-appearing, toxic-appearing or diaphoretic.   HENT:      Head: Normocephalic and atraumatic.      Right Ear: Tympanic membrane normal.      Left Ear: Tympanic membrane normal.   Eyes:      Extraocular Movements: Extraocular movements intact.      Conjunctiva/sclera: Conjunctivae normal.      Pupils: Pupils are equal, round, and reactive to light.   Neck:      Vascular: No carotid bruit.   Cardiovascular:      Rate and Rhythm: Normal rate and regular rhythm.      Pulses: Normal pulses.      Heart sounds: Normal heart sounds.   Pulmonary:      Effort: No respiratory distress.      Breath sounds: Normal breath sounds. No wheezing.   Abdominal:      General: Bowel sounds are normal. There is no distension.      Palpations: Abdomen is soft.      Tenderness: There is no abdominal tenderness. There is no guarding or rebound.   Skin:     General: Skin is warm. "   Neurological:      Mental Status: She is alert.   Psychiatric:         Mood and Affect: Mood normal.         Behavior: Behavior normal.          Laboratory:  CBC:  Recent Labs   Lab 06/30/22  0905 01/29/24  1509   WBC 5.93 7.73   RBC 4.60 4.74   Hemoglobin 13.0 13.7   Hematocrit 39.8 41.5   Platelets 255 277   MCV 87 88   MCH 28.3 28.9   MCHC 32.7 33.0       CMP:  Recent Labs   Lab 06/28/21  0814 02/15/22  0958 06/30/22  0905 04/10/23  1044 08/14/23  1420 09/11/23  1420   Glucose 91   < > 101 96   < > 102   Calcium 10.0   < > 9.9 10.6 H   < > 9.6   Albumin 4.2  --  4.1 4.4  --   --    Total Protein 7.6  --  7.3 7.9  --   --    Sodium 143   < > 142 141   < > 140   Potassium 3.7   < > 4.3 3.8   < > 4.1   CO2 25   < > 26 22 L   < > 21 L   Chloride 105   < > 108 103   < > 104   BUN 20   < > 13 22   < > 21   Creatinine 1.0   < > 0.9 1.1   < > 0.9   Alkaline Phosphatase 74  --  73 93  --   --    ALT 12  --  11 11  --   --    AST 17  --  16 18  --   --    Total Bilirubin 0.4  --  0.2 0.3  --   --     < > = values in this interval not displayed.         LIPIDS:  Recent Labs   Lab 06/28/21  0814 10/27/21  0845 06/30/22  0905 04/10/23  1044   TSH 2.527  --  2.747  --    HDL 66 75 70 67   Cholesterol 249 H 223 H 250 H 257 H   Triglycerides 98 162 H 121 132   LDL Cholesterol 163.4 H 115.6 155.8 163.6 H   HDL/Cholesterol Ratio 26.5 33.6 28.0 26.1   Non-HDL Cholesterol 183 148 180 190   Total Cholesterol/HDL Ratio 3.8 3.0 3.6 3.8       TSH:  Recent Labs   Lab 06/28/21  0814 06/30/22  0905   TSH 2.527 2.747       A1C:  Recent Labs   Lab 06/28/21  0814 06/30/22  0905 04/10/23  1044   Hemoglobin A1C 5.1 5.1 5.0         Assessment/Plan     Cary Claudio is a 64 y.o.female with:    Hypertension, unspecified type  -     olmesartan (BENICAR) 40 MG tablet; Take 1 tablet (40 mg total) by mouth once daily.  Dispense: 90 tablet; Refill: 0  -     chlorthalidone (HYGROTEN) 50 MG Tab; Take 1 tablet (50 mg total) by mouth once daily.   Dispense: 90 tablet; Refill: 0  -     Comprehensive Metabolic Panel; Future; Expected date: 02/28/2024  - Uncontrolled.  Will cont Olmesartan 40 mg/d. Will increase chlorthalidone to 50 mg/d. Cont Amlodipine 2.5 mg/d,. She prefers not to inc amlodipine and not to take Toprol. Rec CMP in 1-2 wks. Rec adeq hydration. Monitor for edema.     Unexplained weight gain  -     TSH; Future; Expected date: 02/28/2024  - Check TSH.  Repeat Ha1c and if elevated consider resuming Metformin. Cont diet and exercise.     Hyperlipidemia, unspecified hyperlipidemia type  -     Lipid Panel; Future; Expected date: 02/28/2024  -Uncontrolled. Repeat FLP. Recent CT calcium score - 0.    Osteoporosis, unspecified osteoporosis type, unspecified pathological fracture presence  - Cont Vit D, Exercise. Repeat DEXA as above.     IFG (impaired fasting glucose)  -     Hemoglobin A1C; Future; Expected date: 02/28/2024  -  Repeat Ha1c and if elevated consider resuming Metformin.     Screening mammogram, encounter for  -     Mammo Digital Screening Bilat w/ Ang; Future; Expected date: 06/03/2024    Screening for colorectal cancer  -     Ambulatory referral/consult to Endo Procedure ; Future; Expected date: 11/22/2024         Chronic conditions status updated as per HPI.  Other than changes above, cont current medications and maintain follow up with specialists.  Follow up in about 8 weeks (around 4/24/2024).      Nicole Rich MD  [facility]

## 2024-02-28 ENCOUNTER — OFFICE VISIT (OUTPATIENT)
Dept: PRIMARY CARE CLINIC | Facility: CLINIC | Age: 64
End: 2024-02-28
Payer: MEDICARE

## 2024-02-28 VITALS
HEART RATE: 89 BPM | WEIGHT: 160.94 LBS | HEIGHT: 63 IN | TEMPERATURE: 98 F | DIASTOLIC BLOOD PRESSURE: 96 MMHG | OXYGEN SATURATION: 98 % | SYSTOLIC BLOOD PRESSURE: 160 MMHG | BODY MASS INDEX: 28.52 KG/M2 | RESPIRATION RATE: 18 BRPM

## 2024-02-28 DIAGNOSIS — Z12.31 SCREENING MAMMOGRAM, ENCOUNTER FOR: ICD-10-CM

## 2024-02-28 DIAGNOSIS — M81.0 OSTEOPOROSIS, UNSPECIFIED OSTEOPOROSIS TYPE, UNSPECIFIED PATHOLOGICAL FRACTURE PRESENCE: ICD-10-CM

## 2024-02-28 DIAGNOSIS — Z12.11 SCREENING FOR COLORECTAL CANCER: ICD-10-CM

## 2024-02-28 DIAGNOSIS — Z12.12 SCREENING FOR COLORECTAL CANCER: ICD-10-CM

## 2024-02-28 DIAGNOSIS — R73.01 IFG (IMPAIRED FASTING GLUCOSE): ICD-10-CM

## 2024-02-28 DIAGNOSIS — I10 HYPERTENSION, UNSPECIFIED TYPE: Primary | ICD-10-CM

## 2024-02-28 DIAGNOSIS — I10 ESSENTIAL HYPERTENSION, BENIGN: ICD-10-CM

## 2024-02-28 DIAGNOSIS — E78.5 HYPERLIPIDEMIA, UNSPECIFIED HYPERLIPIDEMIA TYPE: ICD-10-CM

## 2024-02-28 DIAGNOSIS — R63.5 UNEXPLAINED WEIGHT GAIN: ICD-10-CM

## 2024-02-28 PROCEDURE — 1159F MED LIST DOCD IN RCRD: CPT | Mod: CPTII,S$GLB,, | Performed by: INTERNAL MEDICINE

## 2024-02-28 PROCEDURE — 1160F RVW MEDS BY RX/DR IN RCRD: CPT | Mod: CPTII,S$GLB,, | Performed by: INTERNAL MEDICINE

## 2024-02-28 PROCEDURE — 1126F AMNT PAIN NOTED NONE PRSNT: CPT | Mod: CPTII,S$GLB,, | Performed by: INTERNAL MEDICINE

## 2024-02-28 PROCEDURE — 99214 OFFICE O/P EST MOD 30 MIN: CPT | Mod: S$GLB,,, | Performed by: INTERNAL MEDICINE

## 2024-02-28 PROCEDURE — 3288F FALL RISK ASSESSMENT DOCD: CPT | Mod: CPTII,S$GLB,, | Performed by: INTERNAL MEDICINE

## 2024-02-28 PROCEDURE — 3077F SYST BP >= 140 MM HG: CPT | Mod: CPTII,S$GLB,, | Performed by: INTERNAL MEDICINE

## 2024-02-28 PROCEDURE — 99999 PR PBB SHADOW E&M-EST. PATIENT-LVL V: CPT | Mod: PBBFAC,,, | Performed by: INTERNAL MEDICINE

## 2024-02-28 PROCEDURE — 3008F BODY MASS INDEX DOCD: CPT | Mod: CPTII,S$GLB,, | Performed by: INTERNAL MEDICINE

## 2024-02-28 PROCEDURE — 1101F PT FALLS ASSESS-DOCD LE1/YR: CPT | Mod: CPTII,S$GLB,, | Performed by: INTERNAL MEDICINE

## 2024-02-28 PROCEDURE — 4010F ACE/ARB THERAPY RXD/TAKEN: CPT | Mod: CPTII,S$GLB,, | Performed by: INTERNAL MEDICINE

## 2024-02-28 PROCEDURE — 3080F DIAST BP >= 90 MM HG: CPT | Mod: CPTII,S$GLB,, | Performed by: INTERNAL MEDICINE

## 2024-02-28 RX ORDER — OLMESARTAN MEDOXOMIL 40 MG/1
40 TABLET ORAL DAILY
Qty: 90 TABLET | Refills: 0 | Status: SHIPPED | OUTPATIENT
Start: 2024-02-28 | End: 2024-04-23 | Stop reason: SDUPTHER

## 2024-02-28 RX ORDER — CHLORTHALIDONE 50 MG/1
50 TABLET ORAL DAILY
Qty: 90 TABLET | Refills: 0 | Status: SHIPPED | OUTPATIENT
Start: 2024-02-28 | End: 2024-04-23 | Stop reason: SDUPTHER

## 2024-03-11 ENCOUNTER — CLINICAL SUPPORT (OUTPATIENT)
Dept: PRIMARY CARE CLINIC | Facility: CLINIC | Age: 64
End: 2024-03-11
Payer: MEDICARE

## 2024-03-11 ENCOUNTER — LAB VISIT (OUTPATIENT)
Dept: LAB | Facility: HOSPITAL | Age: 64
End: 2024-03-11
Attending: INTERNAL MEDICINE
Payer: MEDICARE

## 2024-03-11 VITALS — HEART RATE: 87 BPM | DIASTOLIC BLOOD PRESSURE: 78 MMHG | SYSTOLIC BLOOD PRESSURE: 134 MMHG

## 2024-03-11 DIAGNOSIS — R63.5 UNEXPLAINED WEIGHT GAIN: ICD-10-CM

## 2024-03-11 DIAGNOSIS — I10 PRIMARY HYPERTENSION: Primary | ICD-10-CM

## 2024-03-11 DIAGNOSIS — I10 HYPERTENSION, UNSPECIFIED TYPE: ICD-10-CM

## 2024-03-11 DIAGNOSIS — R73.01 IFG (IMPAIRED FASTING GLUCOSE): ICD-10-CM

## 2024-03-11 DIAGNOSIS — E78.5 HYPERLIPIDEMIA, UNSPECIFIED HYPERLIPIDEMIA TYPE: ICD-10-CM

## 2024-03-11 LAB
ALBUMIN SERPL BCP-MCNC: 4.1 G/DL (ref 3.5–5.2)
ALP SERPL-CCNC: 88 U/L (ref 55–135)
ALT SERPL W/O P-5'-P-CCNC: 19 U/L (ref 10–44)
ANION GAP SERPL CALC-SCNC: 11 MMOL/L (ref 8–16)
AST SERPL-CCNC: 21 U/L (ref 10–40)
BILIRUB SERPL-MCNC: 0.3 MG/DL (ref 0.1–1)
BUN SERPL-MCNC: 21 MG/DL (ref 8–23)
CALCIUM SERPL-MCNC: 9.8 MG/DL (ref 8.7–10.5)
CHLORIDE SERPL-SCNC: 103 MMOL/L (ref 95–110)
CHOLEST SERPL-MCNC: 229 MG/DL (ref 120–199)
CHOLEST/HDLC SERPL: 4.1 {RATIO} (ref 2–5)
CO2 SERPL-SCNC: 27 MMOL/L (ref 23–29)
CREAT SERPL-MCNC: 1.1 MG/DL (ref 0.5–1.4)
EST. GFR  (NO RACE VARIABLE): 55.4 ML/MIN/1.73 M^2
ESTIMATED AVG GLUCOSE: 105 MG/DL (ref 68–131)
GLUCOSE SERPL-MCNC: 99 MG/DL (ref 70–110)
HBA1C MFR BLD: 5.3 % (ref 4–5.6)
HDLC SERPL-MCNC: 56 MG/DL (ref 40–75)
HDLC SERPL: 24.5 % (ref 20–50)
LDLC SERPL CALC-MCNC: 152.8 MG/DL (ref 63–159)
NONHDLC SERPL-MCNC: 173 MG/DL
POTASSIUM SERPL-SCNC: 3.9 MMOL/L (ref 3.5–5.1)
PROT SERPL-MCNC: 7.5 G/DL (ref 6–8.4)
SODIUM SERPL-SCNC: 141 MMOL/L (ref 136–145)
TRIGL SERPL-MCNC: 101 MG/DL (ref 30–150)
TSH SERPL DL<=0.005 MIU/L-ACNC: 2.47 UIU/ML (ref 0.4–4)

## 2024-03-11 PROCEDURE — 80053 COMPREHEN METABOLIC PANEL: CPT | Performed by: INTERNAL MEDICINE

## 2024-03-11 PROCEDURE — 99999 PR PBB SHADOW E&M-EST. PATIENT-LVL I: CPT | Mod: PBBFAC,,,

## 2024-03-11 PROCEDURE — 83036 HEMOGLOBIN GLYCOSYLATED A1C: CPT | Performed by: INTERNAL MEDICINE

## 2024-03-11 PROCEDURE — 36415 COLL VENOUS BLD VENIPUNCTURE: CPT | Mod: PN | Performed by: INTERNAL MEDICINE

## 2024-03-11 PROCEDURE — 84443 ASSAY THYROID STIM HORMONE: CPT | Performed by: INTERNAL MEDICINE

## 2024-03-11 PROCEDURE — 80061 LIPID PANEL: CPT | Performed by: INTERNAL MEDICINE

## 2024-03-11 NOTE — PROGRESS NOTES
Patient  came in today for a blood pressure check. Her bp in the right arm was 148/80,pulse 74. Her second reading was 134/78,pulse 87. She is currently on Amlodipine 2.5mg/d,Chlorthalidone 50mg/d and Olmesartan 40mg/d. Patient states that her readings are way down after exercising.  Pts home readings below:  3/4 137/84 p 86  3/5 154/93 p96  3/7 143/85 p90  3/8 136/82 p101,114/61 p90  3/10 155/91 p76

## 2024-03-12 NOTE — PROGRESS NOTES
These readings still appear a bit high on Amlodipine 2.5mg/d,Chlorthalidone 50mg/d and Olmesartan 40mg/d. We  could consider increasing the amlodipine again but I believe she had some increased swelling on the higher dose in the past.  Another option  would be to add Toprol as previously discussed. Let me know if she is agreeable to either of these measures so I can send her a new Prescription. Continue a low sodium diet and exercise.  Dr. UGARTE

## 2024-03-12 NOTE — PROGRESS NOTES
I sent pt a my chart message -  I reviewed your labs.  Your Ha1c was normal at 5.3. Your thyroid functions are normal.  Your Cholesterol again looked high but slightly improved. The 10-year ASCVD risk score (Risk of having a cardiovascular event within 10 yrs) is: 9.6%.   You can visit the American heart association website at heart.org for further info on a low cholesterol diet.   I recommend you consider starting a statin.  I again would recommend you consider Atorvastatin 20 mg daily. There are potential Side effects including muscle pain,  muscle weakness, and hyperglycemia.  Please let me know if you are willing to start this so I can send it to the pharm for you . You should call with any concerns.  Will cont to monitor. I recommend a repeat cholesterol panel in 6 mos.  Your kidney function was stable and liver functions looked good.  No further recommendations at this time.  Dr. UGARTE

## 2024-03-19 ENCOUNTER — TELEPHONE (OUTPATIENT)
Dept: PRIMARY CARE CLINIC | Facility: CLINIC | Age: 64
End: 2024-03-19
Payer: MEDICARE

## 2024-03-20 VITALS — DIASTOLIC BLOOD PRESSURE: 73 MMHG | SYSTOLIC BLOOD PRESSURE: 136 MMHG

## 2024-03-20 NOTE — TELEPHONE ENCOUNTER
Well, that's  her decision.  She should definitely keep an eye on her BP and let us know if this changes and/or her BP gets higher. She can also feel free to discuss with her Cards, Dr. Mary if it elevates further.   Dr. UGARTE

## 2024-04-01 NOTE — PROGRESS NOTES
"  Cary Claudio presented for a  Medicare AWV and comprehensive Health Risk Assessment today. She is a patient of Dr. Rich and is new to me.  The following components were reviewed and updated:    Medical history  Family History  Social history  Allergies and Current Medications  Health Risk Assessment  Health Maintenance  Care Team     ** See Completed Assessments for Annual Wellness Visit within the encounter summary.**    The following assessments were completed:  Living Situation  CAGE  Depression Screening  Timed Get Up and Go  Whisper Test  Cognitive Function Screening  Nutrition Screening  ADL Screening  PAQ Screening  Review for opioid screen: pt does not have rx for opioid  Review for substance use disorder:  pt does not use substance        Vitals:    04/02/24 0918   BP: 138/82   BP Location: Right arm   Patient Position: Sitting   Pulse: 77   Resp: 16   SpO2: 97%   Weight: 73.5 kg (162 lb 0.6 oz)   Height: 5' 3" (1.6 m)     Body mass index is 28.7 kg/m².  Physical Exam  Vitals and nursing note reviewed.   Constitutional:       Appearance: Normal appearance.   HENT:      Head: Normocephalic and atraumatic.   Cardiovascular:      Rate and Rhythm: Normal rate and regular rhythm.      Pulses: Normal pulses.           Radial pulses are 2+ on the right side and 2+ on the left side.        Dorsalis pedis pulses are 2+ on the right side and 2+ on the left side.        Posterior tibial pulses are 2+ on the right side and 2+ on the left side.      Heart sounds: Normal heart sounds.   Pulmonary:      Effort: Pulmonary effort is normal.      Breath sounds: Normal breath sounds.   Musculoskeletal:      Right lower leg: No edema.      Left lower leg: No edema.   Skin:     General: Skin is warm and dry.      Capillary Refill: Capillary refill takes less than 2 seconds.   Neurological:      General: No focal deficit present.      Mental Status: She is alert and oriented to person, place, and time.   Psychiatric:    "      Mood and Affect: Mood normal.         Behavior: Behavior normal.        Diagnoses and health risks identified today and associated recommendations/orders:    1. Encounter for preventive health examination  Assessment and evaluation performed as stated above    2. Hyperlipidemia, unspecified hyperlipidemia type  Stable on diet.  Followed by PCP    3. Primary hypertension  Stable on amlodipine, chlorthalidone, olmesartan.  Followed by PCP.    4. Osteoporosis, unspecified osteoporosis type, unspecified pathological fracture presence  Stable on vitamin-D.  Followed by PCP    5. Overweight (BMI 25.0-29.9)  Stable.  Discussed BMR, eating and portion control for weight loss.  Patient walks 4 days a week, and plays tennis 2 days a week.  Followed by PCP.    Provided Cary with a 5-10 year written screening schedule and personal prevention plan. Recommendations were developed using the USPSTF age appropriate recommendations. Education, counseling, and referrals were provided as needed. After Visit Summary printed and given to patient which includes a list of additional screenings\tests needed.    Follow up in about 1 year (around 4/2/2025), or if symptoms worsen or fail to improve.        Martha Phillips NP      Portions of this note may have been generated using voice recognition software.  Please excuse any spelling/grammatical errors. Occasional wrong-word or sound-a-like substitutions may have also occurred due to the inherent limitations of voice recognition software. Please read the chart carefully and recognize, using context, where substitutions have occurred.    I offered to discuss advanced care planning, including how to pick a person who would make decisions for you if you were unable to make them for yourself, called a health care power of , and what kind of decisions you might make such as use of life sustaining treatments such as ventilators and tube feeding when faced with a life limiting  illness recorded on a living will that they will need to know. (How you want to be cared for as you near the end of your natural life)     X Patient is interested in learning more about how to make advanced directives.  I provided them paperwork and offered to discuss this with them.

## 2024-04-01 NOTE — PATIENT INSTRUCTIONS
Counseling and Referral of Other Preventative  (Italic type indicates deductible and co-insurance are waived)    Patient Name: Cary Claudio  Today's Date: 4/1/2024    Health Maintenance       Date Due Completion Date    RSV Vaccine (Age 60+ and Pregnant patients) (1 - 1-dose 60+ series) Never done ---    Mammogram 06/02/2024 6/2/2023    COVID-19 Vaccine (2 - 2023-24 season) 12/01/2024 (Originally 9/1/2023) 3/10/2021    Colorectal Cancer Screening 11/21/2024 11/21/2014    Hemoglobin A1c (Prediabetes) 03/11/2025 3/11/2024    Lipid Panel 03/11/2025 3/11/2024    DEXA Scan 06/19/2025 6/19/2023    TETANUS VACCINE 05/19/2030 5/19/2020        No orders of the defined types were placed in this encounter.    The following information is provided to all patients.  This information is to help you find resources for any of the problems found today that may be affecting your health:                  Living healthy guide: www.[facility].[place].gov      Understanding Diabetes: www.diabetes.org      Eating healthy: www.cdc.gov/healthyweight      CDC home safety checklist: www.cdc.gov/steadi/patient.html      Agency on Aging: www.goea.[place].gov      Alcoholics anonymous (AA): www.aa.org      Physical Activity: www.gin.nih.gov/hg3cucr      Tobacco use: www.quitwithusla.org

## 2024-04-02 ENCOUNTER — OFFICE VISIT (OUTPATIENT)
Dept: PRIMARY CARE CLINIC | Facility: CLINIC | Age: 64
End: 2024-04-02
Payer: MEDICARE

## 2024-04-02 VITALS
RESPIRATION RATE: 16 BRPM | BODY MASS INDEX: 28.71 KG/M2 | OXYGEN SATURATION: 97 % | SYSTOLIC BLOOD PRESSURE: 138 MMHG | WEIGHT: 162.06 LBS | DIASTOLIC BLOOD PRESSURE: 82 MMHG | HEART RATE: 77 BPM | HEIGHT: 63 IN

## 2024-04-02 DIAGNOSIS — E66.3 OVERWEIGHT (BMI 25.0-29.9): ICD-10-CM

## 2024-04-02 DIAGNOSIS — E78.5 HYPERLIPIDEMIA, UNSPECIFIED HYPERLIPIDEMIA TYPE: ICD-10-CM

## 2024-04-02 DIAGNOSIS — I10 PRIMARY HYPERTENSION: ICD-10-CM

## 2024-04-02 DIAGNOSIS — M81.0 OSTEOPOROSIS, UNSPECIFIED OSTEOPOROSIS TYPE, UNSPECIFIED PATHOLOGICAL FRACTURE PRESENCE: ICD-10-CM

## 2024-04-02 DIAGNOSIS — Z00.00 ENCOUNTER FOR PREVENTIVE HEALTH EXAMINATION: Primary | ICD-10-CM

## 2024-04-02 PROCEDURE — 3079F DIAST BP 80-89 MM HG: CPT | Mod: CPTII,S$GLB,,

## 2024-04-02 PROCEDURE — 3044F HG A1C LEVEL LT 7.0%: CPT | Mod: CPTII,S$GLB,,

## 2024-04-02 PROCEDURE — 3288F FALL RISK ASSESSMENT DOCD: CPT | Mod: CPTII,S$GLB,,

## 2024-04-02 PROCEDURE — 4010F ACE/ARB THERAPY RXD/TAKEN: CPT | Mod: CPTII,S$GLB,,

## 2024-04-02 PROCEDURE — 99999 PR PBB SHADOW E&M-EST. PATIENT-LVL IV: CPT | Mod: PBBFAC,,,

## 2024-04-02 PROCEDURE — 1170F FXNL STATUS ASSESSED: CPT | Mod: CPTII,S$GLB,,

## 2024-04-02 PROCEDURE — 1159F MED LIST DOCD IN RCRD: CPT | Mod: CPTII,S$GLB,,

## 2024-04-02 PROCEDURE — 1160F RVW MEDS BY RX/DR IN RCRD: CPT | Mod: CPTII,S$GLB,,

## 2024-04-02 PROCEDURE — 3075F SYST BP GE 130 - 139MM HG: CPT | Mod: CPTII,S$GLB,,

## 2024-04-02 PROCEDURE — 1101F PT FALLS ASSESS-DOCD LE1/YR: CPT | Mod: CPTII,S$GLB,,

## 2024-04-02 PROCEDURE — G0439 PPPS, SUBSEQ VISIT: HCPCS | Mod: S$GLB,,,

## 2024-04-02 RX ORDER — MAGNESIUM 200 MG
TABLET ORAL ONCE
COMMUNITY

## 2024-04-02 NOTE — Clinical Note
Good Afternoon.  Saw  for AWV today.  Wanted to let you know that the swelling in her BLE is gone after the recent decrease in her amlodipine. Also, she asked me about blood work as she is concerned about her cortisol levels.  I explained to her the transient nature of cortisol levels and why we typically do not draw them on a routine basis, but told her to discuss with you.   Let me know if you need anything else from me.   Martha

## 2024-04-20 DIAGNOSIS — I10 ESSENTIAL HYPERTENSION, BENIGN: ICD-10-CM

## 2024-04-20 NOTE — TELEPHONE ENCOUNTER
No care due was identified.  [facility] Embedded Care Due Messages. Reference number: [id number].   4/20/2024 11:49:14 AM CDT

## 2024-04-22 RX ORDER — CHLORTHALIDONE 25 MG/1
25 TABLET ORAL DAILY
Qty: 90 TABLET | Refills: 0 | OUTPATIENT
Start: 2024-04-22 | End: 2025-04-22

## 2024-04-22 NOTE — TELEPHONE ENCOUNTER
Refill Decision Note   Cary Claudio  is requesting a refill authorization.  Brief Assessment and Rationale for Refill:  Quick Discontinue     Medication Therapy Plan:  The original prescription was reordered on 2/28/2024 by Nicole Rich MD.      Comments:     Note composed:2:12 AM 04/22/2024

## 2024-04-23 ENCOUNTER — OFFICE VISIT (OUTPATIENT)
Dept: PRIMARY CARE CLINIC | Facility: CLINIC | Age: 64
End: 2024-04-23
Payer: MEDICARE

## 2024-04-23 VITALS
DIASTOLIC BLOOD PRESSURE: 88 MMHG | WEIGHT: 163.38 LBS | SYSTOLIC BLOOD PRESSURE: 150 MMHG | OXYGEN SATURATION: 96 % | BODY MASS INDEX: 28.95 KG/M2 | TEMPERATURE: 98 F | HEART RATE: 74 BPM | HEIGHT: 63 IN

## 2024-04-23 DIAGNOSIS — I10 HYPERTENSION, UNSPECIFIED TYPE: Primary | ICD-10-CM

## 2024-04-23 DIAGNOSIS — E78.5 HYPERLIPIDEMIA, UNSPECIFIED HYPERLIPIDEMIA TYPE: ICD-10-CM

## 2024-04-23 DIAGNOSIS — M81.0 OSTEOPOROSIS, UNSPECIFIED OSTEOPOROSIS TYPE, UNSPECIFIED PATHOLOGICAL FRACTURE PRESENCE: ICD-10-CM

## 2024-04-23 PROCEDURE — 3044F HG A1C LEVEL LT 7.0%: CPT | Mod: CPTII,S$GLB,, | Performed by: INTERNAL MEDICINE

## 2024-04-23 PROCEDURE — 1101F PT FALLS ASSESS-DOCD LE1/YR: CPT | Mod: CPTII,S$GLB,, | Performed by: INTERNAL MEDICINE

## 2024-04-23 PROCEDURE — 3079F DIAST BP 80-89 MM HG: CPT | Mod: CPTII,S$GLB,, | Performed by: INTERNAL MEDICINE

## 2024-04-23 PROCEDURE — 3288F FALL RISK ASSESSMENT DOCD: CPT | Mod: CPTII,S$GLB,, | Performed by: INTERNAL MEDICINE

## 2024-04-23 PROCEDURE — 99999 PR PBB SHADOW E&M-EST. PATIENT-LVL IV: CPT | Mod: PBBFAC,,, | Performed by: INTERNAL MEDICINE

## 2024-04-23 PROCEDURE — 1126F AMNT PAIN NOTED NONE PRSNT: CPT | Mod: CPTII,S$GLB,, | Performed by: INTERNAL MEDICINE

## 2024-04-23 PROCEDURE — 1159F MED LIST DOCD IN RCRD: CPT | Mod: CPTII,S$GLB,, | Performed by: INTERNAL MEDICINE

## 2024-04-23 PROCEDURE — 3077F SYST BP >= 140 MM HG: CPT | Mod: CPTII,S$GLB,, | Performed by: INTERNAL MEDICINE

## 2024-04-23 PROCEDURE — 3008F BODY MASS INDEX DOCD: CPT | Mod: CPTII,S$GLB,, | Performed by: INTERNAL MEDICINE

## 2024-04-23 PROCEDURE — 99214 OFFICE O/P EST MOD 30 MIN: CPT | Mod: S$GLB,,, | Performed by: INTERNAL MEDICINE

## 2024-04-23 PROCEDURE — 1160F RVW MEDS BY RX/DR IN RCRD: CPT | Mod: CPTII,S$GLB,, | Performed by: INTERNAL MEDICINE

## 2024-04-23 PROCEDURE — 4010F ACE/ARB THERAPY RXD/TAKEN: CPT | Mod: CPTII,S$GLB,, | Performed by: INTERNAL MEDICINE

## 2024-04-23 RX ORDER — CHLORTHALIDONE 50 MG/1
50 TABLET ORAL DAILY
Qty: 90 TABLET | Refills: 1 | Status: SHIPPED | OUTPATIENT
Start: 2024-04-23 | End: 2025-04-23

## 2024-04-23 RX ORDER — OLMESARTAN MEDOXOMIL 40 MG/1
40 TABLET ORAL DAILY
Qty: 90 TABLET | Refills: 1 | Status: SHIPPED | OUTPATIENT
Start: 2024-04-23 | End: 2025-04-23

## 2024-04-23 RX ORDER — AMLODIPINE BESYLATE 5 MG/1
5 TABLET ORAL DAILY
Qty: 90 TABLET | Refills: 1 | Status: SHIPPED | OUTPATIENT
Start: 2024-04-23

## 2024-04-23 NOTE — PROGRESS NOTES
[facility] Note    Chief Complaint      Chief Complaint   Patient presents with    Follow-up       History of Present Illness      Cary Claudio is a 64 y.o. HTN, Anemia, Duodenal ulcer presents to fu well visit. Last visit - 2/28/24    HTN - Pt on Amlodipine 2.5 mg/d, Chlorthalidone 50mg/d and Olmesartan 40mg/d. We  could consider increasing the amlodipine again but I believe she had some increased swelling on the higher dose in the past.  Another option would be to add Toprol as previously discussed. Pt was not areeable to these changes btut s now agreeable to trying inc amlodipine to 5 mg/d.  I rec she d/w Dr. Mary if BP remained high. Continue a low sodium diet and exercise.      Raynaud's phenomenon- Pt reports 1 episode in Jan. Of fingers turning white briefly anfd felling numb.  She is on Amlodipine.  Can check CARMEN with next labs at fu in 3 mos.      Fam h/o IHSS - IHSS can run in families. Her previous EKG and echo did not look compatible with this but she should alert Dr. Mary.       Hypercalcemia - resolved. Cont to avoid Calcium suppl. Cont to monitor.      Syncope - [place] and was standing for a prolonged period.  EMS checked her and BP was low. She felt she was going to pass out and went to sit down and lost consciousness for 30 sec.  She recalls not feeling that well earlier that AM and had a HA. No further episodes. She did not go to ER because she felt better right away.  She had h/o Prev Vasovagal response when donating blood > 20 yrs ago. Note she she had a prev ASD repair '98.  Echo - 8/5/22- EF 68%, Trace TR, Trace pulmonic Regurg.  EKG - 7/15/22 - NSR.  Fu by Dr. Usman Christie. CT calcium score - 5/1/23 - 0     HLD -    LDL  152 HDL 56 - 3/11/24. She declined so referred her to Cards for second opinion given recent syncopal episode and fam h/o CAD.  Fu by Dr. Usman Christie. CT calcium score - 5/1/23 - 0.  Cholesterol again looked high but slightly improved. The 10-year ASCVD risk score (Risk of having a cardiovascular event within 10 yrs) is: 9.6%. Pt can visit the American heart association website at heart.org for further info on a low cholesterol diet.  I rec  pt consider Atorvastatin 20 mg daily. There are potential Side effects including muscle pain, muscle weakness, and hyperglycemia. She declines any kind of cholesterol medication felicity statins.  Could consider Zetia but she wants to read about it first. Will cont to monitor.       HTN - Pt uncontrolled on Norvasc 2.5 mg/d, Chlorthalidone 25 mg/d, Olmesartan 40 mg/d. Rare swelling.  Rec low sodium diet.  Tried to add Toprol 25 mg/d since she did not like Amlodipine due to swelling but she declined the Toprol so that is why I changed the HCTZ to chlorthalidone.   BP remained uncontrolled on Chlorthalidone 25mg/d and Olmesartan 40mg/d. We added back a low dose of Amlodipine to her regimen to see if this helps since she is on a low dose of Chlorthalidone and this may help with any swelling.  Note - potential ASD repair - inter-atrial septum does appear thickened and hyperechoic which could represent repair. No flow by color doppler. No evidence of significant RV dilation or dysfunction.    Echo - 8/5/22- EF 68%, Trace TR, Trace pulmonic Regurg.   She reports home BP - 140's. She reports dec ankle swelling since dec amlodipine. Cough resolved with changing from Lisinopril to Olmesartan.      H/o Duodenal ulcer - No longer on Omeprazole. Rec Avoid NSAIDS. ok for tylenol prn.     Osteoporosis - I rec  Vitamin D supplements. Avoid Calcium due to prev elev Ca - 10.6.  In addition, I rec weight-bearing  exercise at least 30 minutes 3 times/wk and ideally 5 times/wk.  No specific intensity.  Walking is fine. I just rec you pick a form of weight-bearing exercise you enjoy to facilitate long term compliance and benefit. I also rec  you consider starting therapy to  strengthen your bones such as Alendronate 70 mg by mouth weekly vs Prolia which is a Q 6 month Injection.  Alendronate can cause GI upset and poss Osteonecrosis of the jaw. I would not rec if you are planning dental work. I would likely not rec Alendronate or oral bisphosphonates given h/o Duodenal ulcer. We can repeat  DEXA in 2yrs     Overweight  BMI - 28.94. Up 3 lb since Feb. Pt walks most days. She plays tennis intermittently. She lost 25 lb prev on Metformin.   Pt was started on Metformin 1 per OB for wt loss but didn't so stopped it. Ha1c was normal at 5.3.     H/o nicotine dep - quit '04.  H/o 1 pack/wk x 10 yrs off and on     HCM - Flu - 11/2023 per pt;  Tdap - 5/19/20;  PCV 20 - 4/10/23; PCV 13 - none;  PVX 23 - none;  Shingrix - #1 - 6/28/21 and #2 - 10/27/21; Zostavax - 10/8/14;  COVID - 19 Vaccine (J&J) - 3/10/21; #2 - declines;  MGM - 6/2/23- repeat 1 yr;   Left breast U/S 6/19/20 - benign simple cyst - repeat MGM in 1 yr   - has order; DEXA - 6/19/23 - + Osteoporosis;  PAP - 6/1/20 - neg; pt s/p partial Hys; Hep C Screen -5/19/20 neg;  HIV Screen - 5/19/20- neg; C-scope - 11/21/14 - repeat 10 yrs; Prev PCP - Dr. Rutledge; Ophtho - Dr. Martinez; Ob/GYN - Dr. Borges; well visit - 6/28/21      Patient Care Team:  Nicole Rich MD as PCP - General (Internal Medicine)  Florecita Cochran MA as Care Coordinator     Health Maintenance:  Immunization History   Administered Date(s) Administered    COVID-19, vector-nr, rS-Ad26, PF (Dinos Rule) 03/10/2021    Influenza - Quadrivalent 10/08/2014    Influenza - Quadrivalent - PF *Preferred* (6 months and older) 02/15/2022    Pneumococcal Conjugate - 20 Valent 04/10/2023    Tdap 05/19/2020    Zoster 10/08/2014    Zoster Recombinant 06/28/2021, 10/27/2021      Health Maintenance   Topic Date Due    Mammogram  06/02/2024    Colorectal Cancer Screening  11/21/2024    Lipid Panel  03/11/2025    DEXA Scan  06/19/2025    TETANUS VACCINE  05/19/2030    Hepatitis C  Screening  Completed    Shingles Vaccine  Completed        Past Medical History:  Past Medical History:   Diagnosis Date    Anemia     Cardiac anomaly, congenital     pt had a repair for ?ASD '98 at [facility] with Dr. Aguilar    Congenital heart anomaly     Fibroids     uterine     History of migraine     Hyperlipidemia     Hypertension     Insomnia     Nicotine dependence in remission     PUD (peptic ulcer disease)        Past Surgical History:   has a past surgical history that includes repair of congenital heart disease (); Hysterectomy (); Eye surgery; and Cardiac valve replacement (Hole in heart surgery ).    Family History:  family history includes Arthritis in her sister; Diabetes in her brother and brother; Heart attack (age of onset: 80) in her father; Heart attacks under age 50 (age of onset: 42) in her maternal grandfather; Heart disease (age of onset: 34) in her mother; Heart disease (age of onset: 70) in her father; Hypertension in her mother; Hypertrophic cardiomyopathy in her mother; Lung cancer in her brother; Rheum arthritis in her sister.     Social History:  Social History     Tobacco Use    Smoking status: Former     Current packs/day: 0.00     Types: Cigarettes     Quit date: 2004     Years since quittin.8    Smokeless tobacco: Never    Tobacco comments:     Did not smoke that much. Never now   Substance Use Topics    Alcohol use: Yes     Alcohol/week: 2.0 standard drinks of alcohol     Types: 2 Glasses of wine per week     Comment: rare    Drug use: No       Review of Systems   Constitutional:  Negative for chills, diaphoresis and fever.   HENT:  Negative for hearing loss.    Eyes:  Negative for visual disturbance.        Wears glasses   Respiratory:  Negative for chest tightness and shortness of breath.    Cardiovascular:  Positive for leg swelling. Negative for chest pain and palpitations.        Minimal if on feet a lot.    Gastrointestinal:  Negative for abdominal pain,  "constipation, diarrhea, nausea and vomiting.   Endocrine: Positive for heat intolerance. Negative for cold intolerance and polydipsia.   Genitourinary:  Negative for dysuria and frequency.   Musculoskeletal:  Positive for back pain and leg pain. Negative for arthralgias and myalgias.        First thing in AM and gets moving and it improves   Neurological:  Negative for weakness and numbness.   Psychiatric/Behavioral:  Negative for dysphoric mood. The patient is not nervous/anxious.         Medications:    Current Outpatient Medications:     b complex vitamins tablet, Take 1 tablet by mouth once daily., Disp: , Rfl:     magnesium 200 mg Tab, Take by mouth once., Disp: , Rfl:     RESTASIS 0.05 % ophthalmic emulsion, Place 1 drop into both eyes as needed., Disp: , Rfl:     triamcinolone (KENALOG) 0.5 % ointment, Apply topically 2 (two) times daily. Apply to affected area BID x 2 wks prn rash, Disp: 15 g, Rfl: 0    vitamin D (VITAMIN D3) 1000 units Tab, Take 1,000 Units by mouth once daily., Disp: , Rfl:     ZINC ORAL, Take by mouth Daily., Disp: , Rfl:     amLODIPine (NORVASC) 5 MG tablet, Take 1 tablet (5 mg total) by mouth once daily., Disp: 90 tablet, Rfl: 1    chlorthalidone (HYGROTEN) 50 MG Tab, Take 1 tablet (50 mg total) by mouth once daily., Disp: 90 tablet, Rfl: 1    olmesartan (BENICAR) 40 MG tablet, Take 1 tablet (40 mg total) by mouth once daily., Disp: 90 tablet, Rfl: 1     Allergies:  Review of patient's allergies indicates:   Allergen Reactions    Compazine [prochlorperazine] Swelling     Tongue swelling    Pcn [penicillins] Swelling     Tongue swelling    Lisinopril Other (See Comments)     cough       Physical Exam      Vital Signs  Temp: 98.3 °F (36.8 °C)  Temp Source: Oral  Pulse: 74  SpO2: 96 %  BP: (!) 150/88  BP Location: Right arm  Patient Position: Sitting  Pain Score: 0-No pain  Height and Weight  Height: 5' 3" (160 cm)  Weight: 74.1 kg (163 lb 5.8 oz)  BSA (Calculated - sq m): 1.81 sq " meters  BMI (Calculated): 28.9  Weight in (lb) to have BMI = 25: 140.8      Patient Position: Sitting      Physical Exam  Vitals reviewed.   Constitutional:       General: She is not in acute distress.     Appearance: Normal appearance. She is not ill-appearing, toxic-appearing or diaphoretic.   HENT:      Head: Normocephalic and atraumatic.      Right Ear: Tympanic membrane normal.      Left Ear: Tympanic membrane normal.   Eyes:      Extraocular Movements: Extraocular movements intact.      Conjunctiva/sclera: Conjunctivae normal.      Pupils: Pupils are equal, round, and reactive to light.   Neck:      Vascular: No carotid bruit.   Cardiovascular:      Rate and Rhythm: Regular rhythm.      Pulses: Normal pulses.      Heart sounds: Normal heart sounds.   Pulmonary:      Effort: Pulmonary effort is normal. No respiratory distress.      Breath sounds: Normal breath sounds.   Abdominal:      General: Bowel sounds are normal. There is no distension.      Palpations: Abdomen is soft.      Tenderness: There is no abdominal tenderness. There is no guarding or rebound.   Skin:     General: Skin is warm.   Neurological:      General: No focal deficit present.      Mental Status: She is alert and oriented to person, place, and time.   Psychiatric:         Mood and Affect: Mood normal.         Behavior: Behavior normal.          Laboratory:  CBC:  Recent Labs   Lab 06/30/22  0905 01/29/24  1509   WBC 5.93 7.73   RBC 4.60 4.74   Hemoglobin 13.0 13.7   Hematocrit 39.8 41.5   Platelets 255 277   MCV 87 88   MCH 28.3 28.9   MCHC 32.7 33.0       CMP:  Recent Labs   Lab 06/30/22  0905 04/10/23  1044 08/14/23  1420 03/11/24  1013   Glucose 101 96   < > 99   Calcium 9.9 10.6 H   < > 9.8   Albumin 4.1 4.4  --  4.1   Total Protein 7.3 7.9  --  7.5   Sodium 142 141   < > 141   Potassium 4.3 3.8   < > 3.9   CO2 26 22 L   < > 27   Chloride 108 103   < > 103   BUN 13 22   < > 21   Creatinine 0.9 1.1   < > 1.1   Alkaline Phosphatase 73 93   --  88   ALT 11 11  --  19   AST 16 18  --  21   Total Bilirubin 0.2 0.3  --  0.3    < > = values in this interval not displayed.          LIPIDS:  Recent Labs   Lab 06/28/21  0814 10/27/21  0845 06/30/22  0905 04/10/23  1044 03/11/24  1013   TSH 2.527  --  2.747  --  2.469   HDL 66   < > 70 67 56   Cholesterol 249 H   < > 250 H 257 H 229 H   Triglycerides 98   < > 121 132 101   LDL Cholesterol 163.4 H   < > 155.8 163.6 H 152.8   HDL/Cholesterol Ratio 26.5   < > 28.0 26.1 24.5   Non-HDL Cholesterol 183   < > 180 190 173   Total Cholesterol/HDL Ratio 3.8   < > 3.6 3.8 4.1    < > = values in this interval not displayed.       TSH:  Recent Labs   Lab 06/28/21  0814 06/30/22  0905 03/11/24  1013   TSH 2.527 2.747 2.469       A1C:  Recent Labs   Lab 06/28/21  0814 06/30/22  0905 04/10/23  1044 03/11/24  1013   Hemoglobin A1C 5.1 5.1 5.0 5.3     Assessment/Plan     Cary Claudio is a 64 y.o.female with:    Hypertension, unspecified type  -     chlorthalidone (HYGROTEN) 50 MG Tab; Take 1 tablet (50 mg total) by mouth once daily.  Dispense: 90 tablet; Refill: 1  -     olmesartan (BENICAR) 40 MG tablet; Take 1 tablet (40 mg total) by mouth once daily.  Dispense: 90 tablet; Refill: 1  -     amLODIPine (NORVASC) 5 MG tablet; Take 1 tablet (5 mg total) by mouth once daily.  Dispense: 90 tablet; Refill: 1  - Uncontrolled.  Will try inc amlodipine to 5 mg. Monitor for any edema. Pt will send BP log in 2 wks for MD to review.     Hyperlipidemia, unspecified hyperlipidemia type  -  Rec low cholesterol diet.   She declines any kind of cholesterol medication felicity statins.  Could consider Zetia but she wants to read about it first and let me know. Will cont to monitor.      Osteoporosis, unspecified osteoporosis type, unspecified pathological fracture presence  - Stable.  Cont current regimen.         Chronic conditions status updated as per HPI.  Other than changes above, cont current medications and maintain follow up with  specialists.   Follow up in about 3 months (around 7/23/2024) for as prev sched or sooner if needed.      Nicole Rich MD  [facility]

## 2024-06-03 ENCOUNTER — PATIENT MESSAGE (OUTPATIENT)
Dept: RADIOLOGY | Facility: HOSPITAL | Age: 64
End: 2024-06-03
Payer: MEDICARE

## 2024-09-06 ENCOUNTER — OFFICE VISIT (OUTPATIENT)
Dept: PRIMARY CARE CLINIC | Facility: CLINIC | Age: 64
End: 2024-09-06
Payer: MEDICARE

## 2024-09-06 ENCOUNTER — PATIENT MESSAGE (OUTPATIENT)
Dept: PRIMARY CARE CLINIC | Facility: CLINIC | Age: 64
End: 2024-09-06

## 2024-09-06 VITALS
TEMPERATURE: 97 F | OXYGEN SATURATION: 96 % | HEIGHT: 63 IN | WEIGHT: 164.44 LBS | BODY MASS INDEX: 29.14 KG/M2 | SYSTOLIC BLOOD PRESSURE: 160 MMHG | HEART RATE: 103 BPM | DIASTOLIC BLOOD PRESSURE: 86 MMHG | RESPIRATION RATE: 10 BRPM

## 2024-09-06 DIAGNOSIS — E66.3 OVERWEIGHT (BMI 25.0-29.9): ICD-10-CM

## 2024-09-06 DIAGNOSIS — E78.5 HYPERLIPIDEMIA, UNSPECIFIED HYPERLIPIDEMIA TYPE: ICD-10-CM

## 2024-09-06 DIAGNOSIS — I10 HYPERTENSION, UNSPECIFIED TYPE: Primary | ICD-10-CM

## 2024-09-06 DIAGNOSIS — M81.0 OSTEOPOROSIS, UNSPECIFIED OSTEOPOROSIS TYPE, UNSPECIFIED PATHOLOGICAL FRACTURE PRESENCE: ICD-10-CM

## 2024-09-06 DIAGNOSIS — R73.01 IFG (IMPAIRED FASTING GLUCOSE): ICD-10-CM

## 2024-09-06 PROCEDURE — 99999 PR PBB SHADOW E&M-EST. PATIENT-LVL IV: CPT | Mod: PBBFAC,HCNC,, | Performed by: INTERNAL MEDICINE

## 2024-09-06 RX ORDER — OLMESARTAN MEDOXOMIL 40 MG/1
40 TABLET ORAL DAILY
Qty: 90 TABLET | Refills: 1 | Status: SHIPPED | OUTPATIENT
Start: 2024-10-20 | End: 2025-10-20

## 2024-09-06 RX ORDER — CHLORTHALIDONE 50 MG/1
50 TABLET ORAL DAILY
Qty: 90 TABLET | Refills: 1 | Status: SHIPPED | OUTPATIENT
Start: 2024-10-20 | End: 2025-10-20

## 2024-09-06 RX ORDER — AMLODIPINE BESYLATE 10 MG/1
10 TABLET ORAL DAILY
Qty: 90 TABLET | Refills: 1 | Status: SHIPPED | OUTPATIENT
Start: 2024-09-06

## 2024-09-06 NOTE — PROGRESS NOTES
[facility] Note    Chief Complaint    No chief complaint on file.      History of Present Illness      Cary Claudio is a 64 y.o. HTN, Anemia, Duodenal ulcer presents to fu well visit. Last visit - 4/23/24    Raynaud's phenomenon- Pt reports 1 episode in Jan. Of fingers turning white briefly anfd felling numb.  She is on Amlodipine.  CARMEN neg.      Fam h/o IHSS - IHSS can run in families. Her previous EKG and echo did not look compatible with this but she should alert Dr. Mary.       Hypercalcemia - resolved. Cont to avoid Calcium suppl. Cont to monitor.      Syncope - Pt [place] and was standing for a prolonged period.  EMS checked her and BP was low. She felt she was going to pass out and went to sit down and lost consciousness for 30 sec.  She recalls not feeling that well earlier that AM and had a HA. No further episodes. She did not go to ER because she felt better right away.  She had h/o Prev Vasovagal response when donating blood > 20 yrs ago. Note she she had a prev ASD repair '98.  Echo - 8/5/22- EF 68%, Trace TR, Trace pulmonic Regurg.  EKG - 7/15/22 - NSR.  Fu by Pratik, Dr. Mary. CT calcium score - 5/1/23 - 0     HLD -    LDL  152 HDL 56 - 3/11/24. She declined so referred her to Cards for second opinion given recent syncopal episode and [facility] h/o CAD.  Fu by Pratik, Dr. Mary. CT calcium score - 5/1/23 - 0. Cholesterol again looked high but slightly improved. The 10-year ASCVD risk score (Risk of having a cardiovascular event within 10 yrs) is: 9.6%. Pt can visit the American heart association website at heart.org for further info on a low cholesterol diet.  I rec pt consider Atorvastatin 20 mg daily. There are potential Side effects including muscle pain, muscle weakness, and hyperglycemia. She declines any kind of cholesterol medication felicity statins.  Could consider Zetia but she wants to read about it first. Will cont to  monitor.   Repeat FLP.      HTN - Pt uncontrolled on Norvasc  10 mg/d, Chlorthalidone 25 mg/d, Olmesartan 40 mg/d.  No swelling.  Rec low sodium diet.   Note - potential ASD repair - inter-atrial septum does appear thickened and hyperechoic which could represent repair. No flow by color doppler. No evidence of significant RV dilation or dysfunction.    Echo - 8/5/22- EF 68%, Trace TR, Trace pulmonic Regurg.   She reports home BP - 140's. She reports dec ankle swelling since dec amlodipine. Cough resolved with changing from Lisinopril to Olmesartan. She had some increased swelling on the higher dose of Amlodipine in the past.  She will monitor this.  Again rec adding  Toprol as previously discussed. Pt was not agreeable to these changes. I rec Cards referral for 2nd opinion. She prefers to see a new Cards. Prev saw Dr. Mary. Continue a low sodium diet and exercise.  BP at home 144/81 at home. D/w pt this is still too high.      H/o Duodenal ulcer - No longer on Omeprazole. Rec Avoid NSAIDS. ok for tylenol prn.     Osteoporosis - I rec  Vitamin D supplements. Avoid Calcium due to prev elev Ca - 10.6.  In addition, I rec weight-bearing  exercise at least 30 minutes 3 times/wk and ideally 5 times/wk.  No specific intensity.  Walking is fine. I just rec  a form of weight-bearing exercise you enjoy to facilitate long term compliance and benefit. I jan rec she  consider starting therapy to strengthen her bones such as Alendronate 70 mg by mouth weekly vs Prolia which is a Q 6 month Injection.  Alendronate can cause GI upset and poss Osteonecrosis of the jaw.  I would likely not rec Alendronate or oral bisphosphonates given h/o Duodenal ulcer. We can repeat  DEXA in 2yrs     Overweight  BMI - 29.13 Unchanged since last visit. Pt walks most days. She plays tennis intermittently. She lost 25 lb prev on Metformin.   Pt was started on Metformin 1 per OB for wt loss but didn't so stopped it. Ha1c was normal at 5.3 - 3/11/24.       H/o nicotine dep - quit '04.  H/o 1 pack/wk x 10 yrs off and on     HCM - Flu - declines;  RSV - none; Tdap - 5/19/20;  PCV 20 - 4/10/23; PCV 13 - none;  PVX 23 - none;  Shingrix - #1 - 6/28/21 and #2 - 10/27/21; Zostavax - 10/8/14;  COVID - 19 Vaccine (J&J) - 3/10/21; #2 - declines;  MGM - 6/2/23- repeat 1 yr- sched for Oct.;   Left breast U/S 6/19/20 - benign simple cyst - repeat MGM in 1 yr   - has order; DEXA - 6/19/23 - + Osteoporosis;  PAP - 6/1/20 - neg; pt s/p partial Hys; Hep C Screen -5/19/20 neg;  HIV Screen - 5/19/20- neg; C-scope - 11/21/14 - repeat 10 yrs- has order; Prev PCP - Dr. Rutledge; Ophtho - Dr. Martinez; Ob/GYN - Dr. Borges; well visit - 6/28/21       Patient Care Team:  Nicole Rich MD as PCP - General (Internal Medicine)  Florecita Cochran MA (Inactive) as Care Coordinator     Health Maintenance:  Immunization History   Administered Date(s) Administered    COVID-19, vector-nr, rS-Ad26, PF ([employer]) 03/10/2021    Influenza - Quadrivalent 10/08/2014    Influenza - Quadrivalent - PF *Preferred* (6 months and older) 02/15/2022    Pneumococcal Conjugate - 20 Valent 04/10/2023    Tdap 05/19/2020    Zoster 10/08/2014    Zoster Recombinant 06/28/2021, 10/27/2021      Health Maintenance   Topic Date Due    Mammogram  06/02/2024    Colorectal Cancer Screening  11/21/2024    Lipid Panel  03/11/2025    DEXA Scan  06/19/2025    TETANUS VACCINE  05/19/2030    Hepatitis C Screening  Completed    Shingles Vaccine  Completed        Past Medical History:  Past Medical History:   Diagnosis Date    Anemia     Cardiac anomaly, congenital     pt had a repair for ?ASD '98 at [facility] with Dr. Aguilar    Congenital heart anomaly     Fibroids     uterine     History of migraine     Hyperlipidemia     Hypertension     Insomnia     Nicotine dependence in remission     PUD (peptic ulcer disease)        Past Surgical History:   has a past surgical history that includes repair of congenital heart disease  (); Hysterectomy (); Eye surgery; and Cardiac valve replacement (Hole in heart surgery ).    Family History:  family history includes Arthritis in her sister; Diabetes in her brother and brother; Heart attack (age of onset: 80) in her father; Heart attacks under age 50 (age of onset: 42) in her maternal grandfather; Heart disease (age of onset: 34) in her mother; Heart disease (age of onset: 70) in her father; Hypertension in her mother; Hypertrophic cardiomyopathy in her mother; Lung cancer in her brother; Rheum arthritis in her sister.     Social History:  Social History     Tobacco Use    Smoking status: Former     Current packs/day: 0.00     Types: Cigarettes     Quit date: 2004     Years since quittin.1    Smokeless tobacco: Never    Tobacco comments:     Did not smoke that much. Never now   Substance Use Topics    Alcohol use: Yes     Alcohol/week: 2.0 standard drinks of alcohol     Types: 2 Glasses of wine per week     Comment: rare    Drug use: No       Review of Systems   Constitutional:  Negative for chills, diaphoresis and fever.   HENT:  Negative for hearing loss and tinnitus.    Eyes:  Negative for visual disturbance.   Respiratory:  Negative for cough, chest tightness and shortness of breath.    Cardiovascular:  Negative for chest pain, palpitations and leg swelling.   Gastrointestinal:  Negative for abdominal pain, constipation, diarrhea, nausea and vomiting.   Endocrine: Negative for cold intolerance, heat intolerance and polydipsia.   Genitourinary:  Positive for frequency. Negative for dysuria.        Pt on chlorthalidone. No cloudy or smelly urine.    Musculoskeletal:  Negative for arthralgias and myalgias.   Neurological:  Negative for dizziness and headaches.   Psychiatric/Behavioral:  Negative for depressed mood. The patient is not nervous/anxious.         Medications:    Current Outpatient Medications:     b complex vitamins tablet, Take 1 tablet by mouth once daily.,  "Disp: , Rfl:     magnesium 200 mg Tab, Take by mouth once., Disp: , Rfl:     RESTASIS 0.05 % ophthalmic emulsion, Place 1 drop into both eyes as needed., Disp: , Rfl:     vitamin D (VITAMIN D3) 1000 units Tab, Take 1,000 Units by mouth once daily., Disp: , Rfl:     ZINC ORAL, Take by mouth Daily., Disp: , Rfl:     amLODIPine (NORVASC) 10 MG tablet, Take 1 tablet (10 mg total) by mouth once daily., Disp: 90 tablet, Rfl: 1    [START ON 10/20/2024] chlorthalidone (HYGROTEN) 50 MG Tab, Take 1 tablet (50 mg total) by mouth once daily., Disp: 90 tablet, Rfl: 1    [START ON 10/20/2024] olmesartan (BENICAR) 40 MG tablet, Take 1 tablet (40 mg total) by mouth once daily., Disp: 90 tablet, Rfl: 1    triamcinolone (KENALOG) 0.5 % ointment, Apply topically 2 (two) times daily. Apply to affected area BID x 2 wks prn rash (Patient not taking: Reported on 9/6/2024), Disp: 15 g, Rfl: 0     Allergies:  Review of patient's allergies indicates:   Allergen Reactions    Compazine [prochlorperazine] Swelling     Tongue swelling    Pcn [penicillins] Swelling     Tongue swelling    Lisinopril Other (See Comments)     cough       Physical Exam      Vital Signs  Temp: 97.3 °F (36.3 °C)  Pulse: 103  Resp: 10  SpO2: 96 %  BP: (!) 160/86  BP Location: Left arm  Patient Position: Sitting  Pain Score: 0-No pain  Height and Weight  Height: 5' 3" (160 cm)  Weight: 74.6 kg (164 lb 7.4 oz)  BSA (Calculated - sq m): 1.82 sq meters  BMI (Calculated): 29.1  Weight in (lb) to have BMI = 25: 140.8      Patient Position: Sitting      Physical Exam  Vitals reviewed.   Constitutional:       General: She is not in acute distress.     Appearance: Normal appearance. She is not ill-appearing, toxic-appearing or diaphoretic.   HENT:      Head: Normocephalic and atraumatic.      Right Ear: Tympanic membrane normal.      Left Ear: Tympanic membrane normal.      Mouth/Throat:      Mouth: Mucous membranes are moist.   Eyes:      Extraocular Movements: Extraocular " movements intact.      Conjunctiva/sclera: Conjunctivae normal.      Pupils: Pupils are equal, round, and reactive to light.   Neck:      Vascular: No carotid bruit.   Cardiovascular:      Rate and Rhythm: Normal rate and regular rhythm.      Pulses: Normal pulses.      Heart sounds: Normal heart sounds. No murmur heard.  Pulmonary:      Effort: No respiratory distress.      Breath sounds: Normal breath sounds. No wheezing.   Abdominal:      General: Bowel sounds are normal. There is no distension.      Palpations: Abdomen is soft.      Tenderness: There is no abdominal tenderness. There is no guarding or rebound.   Musculoskeletal:         General: Normal range of motion.   Skin:     General: Skin is warm.   Neurological:      General: No focal deficit present.      Mental Status: She is alert and oriented to person, place, and time.   Psychiatric:         Mood and Affect: Mood normal.         Behavior: Behavior normal.          Laboratory:  CBC:  Recent Labs   Lab 06/30/22  0905 01/29/24  1509   WBC 5.93 7.73   RBC 4.60 4.74   Hemoglobin 13.0 13.7   Hematocrit 39.8 41.5   Platelets 255 277   MCV 87 88   MCH 28.3 28.9   MCHC 32.7 33.0       CMP:  Recent Labs   Lab 06/30/22  0905 04/10/23  1044 08/14/23  1420 03/11/24  1013   Glucose 101 96   < > 99   Calcium 9.9 10.6 H   < > 9.8   Albumin 4.1 4.4  --  4.1   Total Protein 7.3 7.9  --  7.5   Sodium 142 141   < > 141   Potassium 4.3 3.8   < > 3.9   CO2 26 22 L   < > 27   Chloride 108 103   < > 103   BUN 13 22   < > 21   Creatinine 0.9 1.1   < > 1.1   Alkaline Phosphatase 73 93  --  88   ALT 11 11  --  19   AST 16 18  --  21   Total Bilirubin 0.2 0.3  --  0.3    < > = values in this interval not displayed.         LIPIDS:  Recent Labs   Lab 06/30/22  0905 04/10/23  1044 03/11/24  1013   TSH 2.747  --  2.469   HDL 70 67 56   Cholesterol 250 H 257 H 229 H   Triglycerides 121 132 101   LDL Cholesterol 155.8 163.6 H 152.8   HDL/Cholesterol Ratio 28.0 26.1 24.5   Non-HDL  Cholesterol 180 190 173   Total Cholesterol/HDL Ratio 3.6 3.8 4.1       TSH:  Recent Labs   Lab 06/30/22  0905 03/11/24  1013   TSH 2.747 2.469       A1C:  Recent Labs   Lab 06/30/22  0905 04/10/23  1044 03/11/24  1013   Hemoglobin A1C 5.1 5.0 5.3          Assessment/Plan     Cary Claudio is a 64 y.o.female with:    Hypertension, unspecified type  -     Comprehensive Metabolic Panel; Future; Expected date: 09/11/2024  -     amLODIPine (NORVASC) 10 MG tablet; Take 1 tablet (10 mg total) by mouth once daily.  Dispense: 90 tablet; Refill: 1  -     chlorthalidone (HYGROTEN) 50 MG Tab; Take 1 tablet (50 mg total) by mouth once daily.  Dispense: 90 tablet; Refill: 1  -     olmesartan (BENICAR) 40 MG tablet; Take 1 tablet (40 mg total) by mouth once daily.  Dispense: 90 tablet; Refill: 1  -     Ambulatory referral/consult to Cardiology; Future; Expected date: 09/13/2024  - Uncontrolled. Rec adding Toprol. P declines for nwo but will look into it and let me know.  Refer to Cards for 2nd opinion. Cont with diet and exercise. Monitor for any edema.     Hyperlipidemia, unspecified hyperlipidemia type  -     Comprehensive Metabolic Panel; Future; Expected date: 09/11/2024  -     Lipid Panel; Future; Expected date: 09/11/2024  - Uncontrolled with elev ASCVD risk score.  CT calcium score - zero. Declines pharmacotherapy. Refer to Cards. Rec Better BP control.     Osteoporosis, unspecified osteoporosis type, unspecified pathological fracture presence  - Stable.  Cont current regimen. Pt does light weights.     Overweight (BMI 25.0-29.9)  - Pt working on diet and exercise.     IFG (impaired fasting glucose)  - Ha1c nl - cont to monitor. Cont.  diet and exercise.        Chronic conditions status updated as per HPI.  Other than changes above, cont current medications and maintain follow up with specialists.   Follow up in about 6 months (around 3/6/2025).      Nicole Rich MD  [facility]

## 2024-09-06 NOTE — TELEPHONE ENCOUNTER
I don't recall talking about cortisol with her. That's not something I often order.  We can add it. Has to be drawn at 8 am.  Dr. UGARTE

## 2024-09-06 NOTE — TELEPHONE ENCOUNTER
The patient would like to have keren cortisol levels check. Patient is scheduled for labs next Friday.

## 2024-09-20 ENCOUNTER — LAB VISIT (OUTPATIENT)
Dept: LAB | Facility: HOSPITAL | Age: 64
End: 2024-09-20
Attending: INTERNAL MEDICINE
Payer: MEDICARE

## 2024-09-20 DIAGNOSIS — E78.5 HYPERLIPIDEMIA, UNSPECIFIED HYPERLIPIDEMIA TYPE: ICD-10-CM

## 2024-09-20 DIAGNOSIS — I10 HYPERTENSION, UNSPECIFIED TYPE: ICD-10-CM

## 2024-09-20 DIAGNOSIS — M81.0 OSTEOPOROSIS, UNSPECIFIED OSTEOPOROSIS TYPE, UNSPECIFIED PATHOLOGICAL FRACTURE PRESENCE: ICD-10-CM

## 2024-09-20 LAB
ALBUMIN SERPL BCP-MCNC: 4.1 G/DL (ref 3.5–5.2)
ALP SERPL-CCNC: 84 U/L (ref 55–135)
ALT SERPL W/O P-5'-P-CCNC: 12 U/L (ref 10–44)
ANION GAP SERPL CALC-SCNC: 12 MMOL/L (ref 8–16)
AST SERPL-CCNC: 19 U/L (ref 10–40)
BILIRUB SERPL-MCNC: 0.4 MG/DL (ref 0.1–1)
BUN SERPL-MCNC: 28 MG/DL (ref 8–23)
CALCIUM SERPL-MCNC: 9.9 MG/DL (ref 8.7–10.5)
CHLORIDE SERPL-SCNC: 103 MMOL/L (ref 95–110)
CHOLEST SERPL-MCNC: 237 MG/DL (ref 120–199)
CHOLEST/HDLC SERPL: 4 {RATIO} (ref 2–5)
CO2 SERPL-SCNC: 28 MMOL/L (ref 23–29)
CORTIS SERPL-MCNC: 20.4 UG/DL (ref 4.3–22.4)
CREAT SERPL-MCNC: 1.2 MG/DL (ref 0.5–1.4)
EST. GFR  (NO RACE VARIABLE): 49.9 ML/MIN/1.73 M^2
GLUCOSE SERPL-MCNC: 98 MG/DL (ref 70–110)
HDLC SERPL-MCNC: 60 MG/DL (ref 40–75)
HDLC SERPL: 25.3 % (ref 20–50)
LDLC SERPL CALC-MCNC: 149.4 MG/DL (ref 63–159)
NONHDLC SERPL-MCNC: 177 MG/DL
POTASSIUM SERPL-SCNC: 3.8 MMOL/L (ref 3.5–5.1)
PROT SERPL-MCNC: 7.4 G/DL (ref 6–8.4)
SODIUM SERPL-SCNC: 143 MMOL/L (ref 136–145)
TRIGL SERPL-MCNC: 138 MG/DL (ref 30–150)

## 2024-09-20 PROCEDURE — 36415 COLL VENOUS BLD VENIPUNCTURE: CPT | Mod: HCNC,PN | Performed by: INTERNAL MEDICINE

## 2024-09-20 PROCEDURE — 82533 TOTAL CORTISOL: CPT | Mod: HCNC | Performed by: INTERNAL MEDICINE

## 2024-09-20 PROCEDURE — 80053 COMPREHEN METABOLIC PANEL: CPT | Mod: HCNC | Performed by: INTERNAL MEDICINE

## 2024-09-20 PROCEDURE — 82306 VITAMIN D 25 HYDROXY: CPT | Mod: HCNC | Performed by: INTERNAL MEDICINE

## 2024-09-20 PROCEDURE — 80061 LIPID PANEL: CPT | Mod: HCNC | Performed by: INTERNAL MEDICINE

## 2024-09-23 LAB — 25(OH)D3+25(OH)D2 SERPL-MCNC: 73 NG/ML (ref 30–96)

## 2024-10-07 ENCOUNTER — PATIENT MESSAGE (OUTPATIENT)
Dept: PRIMARY CARE CLINIC | Facility: CLINIC | Age: 64
End: 2024-10-07
Payer: MEDICARE

## 2024-10-07 DIAGNOSIS — K92.1 MELENA: ICD-10-CM

## 2024-10-07 DIAGNOSIS — K27.9 PUD (PEPTIC ULCER DISEASE): Primary | ICD-10-CM

## 2024-10-08 RX ORDER — PANTOPRAZOLE SODIUM 40 MG/1
40 TABLET, DELAYED RELEASE ORAL DAILY
Qty: 90 TABLET | Refills: 0 | Status: SHIPPED | OUTPATIENT
Start: 2024-10-08 | End: 2025-10-08

## 2024-10-08 NOTE — TELEPHONE ENCOUNTER
Have her start   pantoprazole 40 mg/d for at least the next 4 wks.  I sent a Rx.  I will place a GI referral for a possible scope. Make sure she is not taking iron or peptobismol which can darken the stool. Also remind her to avoid NSAIDS.   Dr. UGARTE

## 2024-10-11 ENCOUNTER — HOSPITAL ENCOUNTER (OUTPATIENT)
Dept: RADIOLOGY | Facility: HOSPITAL | Age: 64
Discharge: HOME | End: 2024-10-11
Attending: INTERNAL MEDICINE
Payer: MEDICARE

## 2024-10-11 DIAGNOSIS — Z12.31 SCREENING MAMMOGRAM, ENCOUNTER FOR: ICD-10-CM

## 2024-10-11 PROCEDURE — 77063 BREAST TOMOSYNTHESIS BI: CPT | Mod: 26,HCNC,, | Performed by: RADIOLOGY

## 2024-10-11 PROCEDURE — 77063 BREAST TOMOSYNTHESIS BI: CPT | Mod: TC,HCNC

## 2024-10-11 PROCEDURE — 77067 SCR MAMMO BI INCL CAD: CPT | Mod: 26,HCNC,, | Performed by: RADIOLOGY

## 2024-10-11 PROCEDURE — 77067 SCR MAMMO BI INCL CAD: CPT | Mod: TC,HCNC

## 2024-10-14 NOTE — PROGRESS NOTES
I sent pt a my chart message -  I reviewed your Mammogram -   The breasts are heterogeneously dense, which may obscure small masses. There is no evidence of suspicious masses, calcifications, or other abnormal findings.  There is no mammographic evidence of malignancy.  Routine screening mammogram in 1 year is recommended.  Dr. UGARTE

## 2024-10-16 ENCOUNTER — PATIENT MESSAGE (OUTPATIENT)
Dept: ADMINISTRATIVE | Facility: HOSPITAL | Age: 64
End: 2024-10-16
Payer: MEDICARE

## 2024-10-23 ENCOUNTER — OFFICE VISIT (OUTPATIENT)
Dept: CARDIOLOGY | Facility: CLINIC | Age: 64
End: 2024-10-23
Payer: MEDICARE

## 2024-10-23 VITALS
DIASTOLIC BLOOD PRESSURE: 89 MMHG | HEIGHT: 63 IN | BODY MASS INDEX: 29.21 KG/M2 | SYSTOLIC BLOOD PRESSURE: 163 MMHG | WEIGHT: 164.88 LBS | HEART RATE: 92 BPM

## 2024-10-23 DIAGNOSIS — I10 HYPERTENSION, UNSPECIFIED TYPE: ICD-10-CM

## 2024-10-23 DIAGNOSIS — E78.2 MIXED HYPERLIPIDEMIA: ICD-10-CM

## 2024-10-23 DIAGNOSIS — Q21.10 ASD (ATRIAL SEPTAL DEFECT): Primary | ICD-10-CM

## 2024-10-23 LAB
OHS QRS DURATION: 86 MS
OHS QTC CALCULATION: 456 MS

## 2024-10-23 PROCEDURE — 99999 PR PBB SHADOW E&M-EST. PATIENT-LVL IV: CPT | Mod: PBBFAC,,, | Performed by: INTERNAL MEDICINE

## 2024-10-23 RX ORDER — NEBIVOLOL 10 MG/1
10 TABLET ORAL DAILY
Qty: 90 TABLET | Refills: 3 | Status: SHIPPED | OUTPATIENT
Start: 2024-10-23

## 2024-10-23 NOTE — PROGRESS NOTES
HISTORY:    64-year-old female with a history of hypertension, hyperlipidemia, ASD status post repair '98 presenting for initial evaluation by me.    Comes in to establish CV care.    The patient denies any symptoms of chest pain, shortness of breath, or dyspnea on exertion.    Activity levels moderate. Plays tennis and walks for exercise. Goes to the gym occasionally.    The patient denies any previous history of myocardial infarction, coronary artery disease, peripheral arterial disease, stroke, congestive heart failure, or cardiomyopathy. Incidental finding of ASD in the late 90s s/p closure. No symptoms at that time.    H/o hypertension with Bps 140-150s/80s.     Tolerates olmesartan 40 x 1, chlorthalidone 50 x 1, amlodipine 5 x 1. Had cough w lisinopril. Has swelling w amlodipine 10.    PHYSICAL EXAM:    Vitals:    10/23/24 0957   BP: (!) 163/89   Pulse: 92       NAD, A+Ox3.  No jvd, no bruit.  RRR nml s1,s2. No murmurs.  CTA B no wheezes or crackles.  No edema.    LABS/STUDIES (imaging reviewed during clinic visit):    January 2024 CBC normal.  September 2024 creatinine 1.2/BUN 28/GFR 50.  /HDL 60//.  March A1c and TSH normal.    ECG 2022 sinus rhythm no Q-waves or ST changes.    TTE 2022 normal LV size and function with EF 65-70%.  Normal diastology.    Coronary calcium score 2023 Agatston score of 0.      ASSESSMENT & PLAN:    1. ASD (atrial septal defect) s/p surgical repair    2. Hypertension, unspecified type    3. Mixed hyperlipidemia        Orders Placed This Encounter    IN OFFICE EKG 12-LEAD (to Muse)    nebivoloL (BYSTOLIC) 10 MG Tab        Elevated Bps on olmesartan 40 x 1, chlorthalidone 50 x 1, amlodipine 5 x 1. Has some swelling even on amlodipine 5x1. Can stop and trial nebivilol 10x1. Max dose 20x1. Can consider mineralocorticoid antagonist after.    Bps were in a normal range when she was at a lighter weight on graphic trends.      w coronary calcium score of 0. Not  interested in statins long term.    ASD s/p closure. Nml TTE '22.    We discussed the importance of diet modifications and instituting an exercise regimen.        Follow up in about 6 months (around 4/23/2025).      Norm Hill MD

## 2025-01-09 ENCOUNTER — PATIENT MESSAGE (OUTPATIENT)
Dept: ADMINISTRATIVE | Facility: HOSPITAL | Age: 65
End: 2025-01-09
Payer: MEDICARE

## 2025-03-24 ENCOUNTER — TELEPHONE (OUTPATIENT)
Dept: GASTROENTEROLOGY | Facility: CLINIC | Age: 65
End: 2025-03-24
Payer: MEDICARE

## 2025-05-02 ENCOUNTER — TELEPHONE (OUTPATIENT)
Dept: ENDOSCOPY | Facility: HOSPITAL | Age: 65
End: 2025-05-02
Payer: MEDICARE

## 2025-05-02 ENCOUNTER — OFFICE VISIT (OUTPATIENT)
Dept: GASTROENTEROLOGY | Facility: CLINIC | Age: 65
End: 2025-05-02
Payer: MEDICARE

## 2025-05-02 VITALS
HEIGHT: 63 IN | BODY MASS INDEX: 28.95 KG/M2 | WEIGHT: 163.38 LBS | SYSTOLIC BLOOD PRESSURE: 191 MMHG | HEART RATE: 71 BPM | DIASTOLIC BLOOD PRESSURE: 101 MMHG

## 2025-05-02 DIAGNOSIS — K92.1 MELENA: ICD-10-CM

## 2025-05-02 DIAGNOSIS — K92.1 MELENA: Primary | ICD-10-CM

## 2025-05-02 DIAGNOSIS — Z12.11 COLON CANCER SCREENING: Primary | ICD-10-CM

## 2025-05-02 DIAGNOSIS — K27.9 PEPTIC ULCER DISEASE: ICD-10-CM

## 2025-05-02 DIAGNOSIS — Z86.19 HISTORY OF HELICOBACTER PYLORI INFECTION: ICD-10-CM

## 2025-05-02 DIAGNOSIS — Z87.19 HISTORY OF CHRONIC GASTRITIS: ICD-10-CM

## 2025-05-02 DIAGNOSIS — R10.84 GENERALIZED ABDOMINAL PAIN: ICD-10-CM

## 2025-05-02 DIAGNOSIS — K21.9 GASTROESOPHAGEAL REFLUX DISEASE, UNSPECIFIED WHETHER ESOPHAGITIS PRESENT: ICD-10-CM

## 2025-05-02 DIAGNOSIS — R19.7 DIARRHEA, UNSPECIFIED TYPE: ICD-10-CM

## 2025-05-02 DIAGNOSIS — K27.9 PUD (PEPTIC ULCER DISEASE): ICD-10-CM

## 2025-05-02 DIAGNOSIS — Z87.19 HISTORY OF DUODENAL ULCER: ICD-10-CM

## 2025-05-02 DIAGNOSIS — R19.4 BOWEL HABIT CHANGES: ICD-10-CM

## 2025-05-02 PROCEDURE — 99999 PR PBB SHADOW E&M-EST. PATIENT-LVL IV: CPT | Mod: PBBFAC,,,

## 2025-05-02 RX ORDER — SOD SULF/POT CHLORIDE/MAG SULF 1.479 G
12 TABLET ORAL DAILY
Qty: 24 TABLET | Refills: 0 | Status: SHIPPED | OUTPATIENT
Start: 2025-05-02

## 2025-05-02 RX ORDER — PANTOPRAZOLE SODIUM 40 MG/1
40 TABLET, DELAYED RELEASE ORAL DAILY
Qty: 90 TABLET | Refills: 1 | Status: SHIPPED | OUTPATIENT
Start: 2025-05-02 | End: 2025-10-29

## 2025-05-02 NOTE — PROGRESS NOTES
Gastroenterology Clinic Consultation Note    Reason for Visit:  The primary encounter diagnosis was Melena. Diagnoses of History of duodenal ulcer, History of Helicobacter pylori infection, History of chronic gastritis, and Bowel habit changes were also pertinent to this visit.    PCP:   Nicole Rich   [address] [facility] [id number]    Initial HPI   This is a 65 y.o. female with a past medical history of Anemia, Cardiac anomaly, congenital, Congenital heart anomaly, Fibroids, History of migraine, Hyperlipidemia, Hypertension, Insomnia, Nicotine dependence in remission, and PUD (peptic ulcer disease) presenting for abdominal pains, nausea, and dark stools.     Symptoms started October of last year. Patient reports similar symptoms to when she was diagnosed with a stomach ulcer approximately 10 years prior. Reports nausea, decreased appetite, and fatigue. Notes abdominal pains and burning that radiate to her backside. One week prior to her GI appt, her symptoms subsided so she cancelled. She ultimately postponed her appt to a later date. In between appts, she experienced significant abdominal pains. She suspected kidney stone relation. Pain predominately located in her RLQ radiating to her back. Lasted four days. Ultimately went away. Thought maybe she should cancel this appointment.     Finds her stress levels bring on her ulcers. Her brother passed away end of last year from cancer.     She also notes recent onset of black stools (October). Since then, bowel movements have been inconsistent.   Prior to she was having daily bowel movements once daily with no issues or concerns. Still produces daily bowel movements but does not complete bowel movement. Finds stool amount is smaller. Does experience watery stools depending on foods she eats. Thinks she may have dairy or gluten sensitivity  Denies use of iron supplements or pepto  Takes magnesium and vit d supplements.    She recently reached  out to her PCP with these concerns. She was prescribed pantoprazole 40 mg once daily for at least 4 weeks and referred here to GI for possible scope. Can not recall if she started PPI at that time due to stressful events that were taking place    Had both upper and lower endoscopy performed in 2014. Initial EGD performed noted One duodenal ulcer with clean base. Stomach was biopsies to rule out h.pylori (treated) which was confirmed in addition to chronic active gastritis. Repeat EGD 4 months following confirmed healing of duodenal ulcer with no additional findings. Colonoscopy (2014) noted diverticulosis in the sigmoid colon, otherwise normal with recommended repeat in 10 years.     Denies family history of colon cancers    Denies NSAID use.     Says overall today her symptoms have improved with the exception of darker stools being ongoing.    Denies any dysphagia, odynophagia, nausea, vomiting, heartburn or acid regurgitation. No abdominal pains, blood/ mucus in stool or unintentional weight loss. Nocturnal symptoms. No recent changes in diet or medications. No family history of IBD, Celiac disease or GI malignancy. No regular NSAIDs. No alcohol or tobacco use. No recent antibiotic use, travels or sick contacts. No prior history of C.diff.    Abdominal Surgeries: Hysterectomy    ROS:  Review of Systems   Constitutional:  Negative for chills, fever, malaise/fatigue and weight loss.   Respiratory:  Negative for cough, hemoptysis, sputum production, shortness of breath and wheezing.    Cardiovascular:  Negative for chest pain, palpitations, orthopnea, claudication, leg swelling and PND.   Gastrointestinal:  Positive for melena. Negative for abdominal pain, blood in stool, constipation, diarrhea, heartburn, nausea and vomiting.        Bowel pattern changes    Genitourinary:  Negative for dysuria, flank pain, frequency, hematuria and urgency.   Musculoskeletal:  Negative for back pain, falls, joint pain, myalgias and  "neck pain.   Skin:  Negative for itching and rash.   Neurological:  Negative for dizziness, seizures, loss of consciousness, weakness and headaches.   Psychiatric/Behavioral:  The patient is nervous/anxious. The patient does not have insomnia.       Medical History:  has a past medical history of Anemia, Cardiac anomaly, congenital, Congenital heart anomaly, Fibroids, History of migraine, Hyperlipidemia, Hypertension, Insomnia, Nicotine dependence in remission, and PUD (peptic ulcer disease).    Surgical History:  has a past surgical history that includes repair of congenital heart disease (1998); Hysterectomy (2014); Eye surgery; and Cardiac valve replacement (Hole in heart surgery 1998).    Family History: family history includes Arthritis in her sister; Diabetes in her brother and brother; Heart attack (age of onset: 80) in her father; Heart attacks under age 50 (age of onset: 42) in her maternal grandfather; Heart disease (age of onset: 34) in her mother; Heart disease (age of onset: 70) in her father; Hypertension in her mother; Hypertrophic cardiomyopathy in her mother; Lung cancer in her brother; Rheum arthritis in her sister..     Review of patient's allergies indicates:   Allergen Reactions    Compazine [prochlorperazine] Swelling     Tongue swelling    Pcn [penicillins] Swelling     Tongue swelling    Lisinopril Other (See Comments)     cough     Medications Ordered Prior to Encounter[1]    Objective Findings:    Vital Signs:  BP (!) 191/101 (BP Location: Left arm, Patient Position: Sitting)   Pulse 71   Ht 5' 3" (1.6 m)   Wt 74.1 kg (163 lb 5.8 oz)   LMP 12/16/2014 (Approximate)   BMI 28.94 kg/m²   Body mass index is 28.94 kg/m².    Physical Exam  Constitutional:       Appearance: Normal appearance. She is normal weight.   HENT:      Head: Normocephalic and atraumatic.      Nose: Nose normal.      Mouth/Throat:      Mouth: Mucous membranes are moist.      Pharynx: Oropharynx is clear. "   Cardiovascular:      Rate and Rhythm: Normal rate and regular rhythm.      Pulses: Normal pulses.      Heart sounds: Normal heart sounds.   Pulmonary:      Effort: Pulmonary effort is normal.      Breath sounds: Normal breath sounds.   Abdominal:      General: Bowel sounds are normal.      Palpations: Abdomen is soft.   Musculoskeletal:         General: Normal range of motion.      Cervical back: Normal range of motion and neck supple.   Skin:     General: Skin is warm and dry.   Neurological:      General: No focal deficit present.      Mental Status: She is alert and oriented to person, place, and time.   Psychiatric:         Mood and Affect: Mood normal.         Behavior: Behavior normal.         Thought Content: Thought content normal.         Judgment: Judgment normal.     Labs:  Lab Results   Component Value Date    WBC 7.73 01/29/2024    HGB 13.7 01/29/2024    HCT 41.5 01/29/2024     01/29/2024    CRP 5.3 01/29/2024    CHOL 237 (H) 09/20/2024    TRIG 138 09/20/2024    HDL 60 09/20/2024    ALKPHOS 84 09/20/2024    ALT 12 09/20/2024    AST 19 09/20/2024     09/20/2024    K 3.8 09/20/2024     09/20/2024    CREATININE 1.2 09/20/2024    BUN 28 (H) 09/20/2024    CO2 28 09/20/2024    TSH 2.469 03/11/2024    HGBA1C 5.3 03/11/2024     Imaging reviewed:   No recent/relevant GI imaging performed for review.     Endoscopy reviewed:   Procedure:            Upper GI endoscopy 11/21/2014  Indications:          Follow-up of peptic ulcer   Findings:       The examined esophagus was normal.        The entire examined stomach was normal.        The examined duodenum was normal.   Impression:           - Normal Study. Duodenal ulcer has healed.     Procedure:            Colonoscopy 11/21/2014  Indications:          Screening for colorectal malignant neoplasm   Impression:           - Diverticulosis in the sigmoid colon.   - Repeat colonoscopy in 10 years for screening                         purposes.      Assessment:  1. Melena    2. History of duodenal ulcer    3. History of Helicobacter pylori infection    4. History of chronic gastritis    5. Bowel habit changes         Plan:  Schedule EGD with biopsies including DISAAC and Celiac if suspicious (ongoing symptoms with hx of gastritis, PUD, h.pylori infection) & Screening Colonoscopy (in the setting of melena and bowel pattern changes)   2. Restart pantoprazole 40 mg once daily if symptoms return. Take your PPI 30-45 minutes before your first protein containing meal (breakfast) every day. Take once daily for 8 weeks. If symptoms improve ok discontinue an use PPI as needed for symptoms.   3. Advised BP recheck. Patient says this would not make a difference. Denies symptoms including CP/SOB. Will follow up with her PCP in regards to this. Hx of hypertension being managed by PCP.   4. F/u with GI in 3 months       Thank you for allowing me to participate in this patient's care.    Sincerely,     OTIS RIOS  Gastroenterology Department  [facility]          [1]   Current Outpatient Medications on File Prior to Visit   Medication Sig Dispense Refill    b complex vitamins tablet Take 1 tablet by mouth once daily.      chlorthalidone (HYGROTEN) 50 MG Tab Take 1 tablet (50 mg total) by mouth once daily. 90 tablet 1    magnesium 200 mg Tab Take by mouth once.      nebivoloL (BYSTOLIC) 10 MG Tab Take 1 tablet (10 mg total) by mouth once daily. 90 tablet 3    olmesartan (BENICAR) 40 MG tablet Take 1 tablet (40 mg total) by mouth once daily. 90 tablet 1    pantoprazole (PROTONIX) 40 MG tablet TAKE 1 TABLET BY MOUTH ONCE DAILY. 90 tablet 0    RESTASIS 0.05 % ophthalmic emulsion Place 1 drop into both eyes as needed.      vitamin D (VITAMIN D3) 1000 units Tab Take 1,000 Units by mouth once daily.      ZINC ORAL Take by mouth Daily.      amLODIPine (NORVASC) 10 MG tablet Take 1 tablet (10 mg total) by mouth once daily. 90 tablet 1    triamcinolone  (KENALOG) 0.5 % ointment Apply topically 2 (two) times daily. Apply to affected area BID x 2 wks prn rash (Patient not taking: Reported on 9/6/2024) 15 g 0     No current facility-administered medications on file prior to visit.

## 2025-05-02 NOTE — TELEPHONE ENCOUNTER
"Referral for procedure from In basket      Spoke to patient to schedule procedure(s) Colonoscopy/EGD       Physician to perform procedure(s) Dr. BALBIR Nobles  Date of Procedure (s) 5/23/2025  Arrival Time 12:45 PM  Time of Procedure(s) 1:45 PM   Location of Procedure(s) [facility] [address]  Type of Rx Prep sent to patient: [place]  Instructions provided to patient via [facility]    Patient was informed on the following information and verbalized understanding. Screening questionnaire reviewed with patient and complete. If procedure requires anesthesia, a responsible adult needs to be present to accompany the patient home, patient cannot drive after receiving anesthesia. Appointment details are tentative, especially check-in time. Patient will receive a prep-op call 7 days prior to confirm check-in time for procedure. If applicable the patient should contact their pharmacy to verify Rx for procedure prep is ready for pick-up. Patient was advised to call the scheduling department at [phone] if pharmacy states no Rx is available. Patient was advised to call the endoscopy scheduling department if any questions or concerns arise.       Endoscopy Scheduling Department     From: Charley Keys NP   Sent: 5/2/2025   9:51 AM CDT   To: Clare Amaro RN     Procedure: EGD/Colonoscopy *Requesting Dr. Nobles*     Diagnosis: Screening colonoscopy, Diarrhea, Abdominal pain, GERD, Melena, and Peptic ulcer disease     Procedure Timing: Within 4 weeks (Urgent)     *If within 4 weeks selected, please jose as high priority*     *If greater than 12 weeks, please select "5-12 weeks" and delay sending until 3 months prior to requested date*     Location: [facility]     Additional Scheduling Information: Advanced cardiac (hypertension)     Prep Specifications:Standard prep     Is the patient taking a GLP-1 Agonist:no     Have you attached a patient to this message: yes   ----- Message -----   From: Charley Keys NP   Sent: " "5/2/2025   9:51 AM CDT   To: Clare Amaro RN     Procedure: EGD/Colonoscopy     Diagnosis: Screening colonoscopy, Diarrhea, Abdominal pain, GERD, Melena, and Peptic ulcer disease     Procedure Timing: Within 4 weeks (Urgent)     *If within 4 weeks selected, please jose as high priority*     *If greater than 12 weeks, please select "5-12 weeks" and delay sending until 3 months prior to requested date*     Location: [place]     Additional Scheduling Information: Advanced cardiac (hypertension)     Prep Specifications:Standard prep     Is the patient taking a GLP-1 Agonist:no     Have you attached a patient to this message: yes   "

## 2025-05-21 ENCOUNTER — PATIENT MESSAGE (OUTPATIENT)
Dept: GASTROENTEROLOGY | Facility: CLINIC | Age: 65
End: 2025-05-21
Payer: MEDICARE

## 2025-05-21 ENCOUNTER — ANESTHESIA EVENT (OUTPATIENT)
Dept: ENDOSCOPY | Facility: HOSPITAL | Age: 65
End: 2025-05-21
Payer: MEDICARE

## 2025-05-21 NOTE — ANESTHESIA PREPROCEDURE EVALUATION
05/21/2025  Cary Claudio is a 65 y.o., female.  [facility]  Anesthesia Pre-Operative Evaluation       Patient Name: Cary Claudio  YOB: 1960  MRN: [id number]  [facility]: [id number]      Code Status: No Order   Date of Procedure: 5/23/2025  Anesthesia: Choice Procedure: Procedure(s) (LRB):  EGD (ESOPHAGOGASTRODUODENOSCOPY) (N/A)  COLONOSCOPY, SCREENING, LOW RISK PATIENT (N/A)  Pre-Operative Diagnosis: Colon cancer screening [Z12.11]  Diarrhea, unspecified type [R19.7]  Generalized abdominal pain [R10.84]  Gastroesophageal reflux disease, unspecified whether esophagitis present [K21.9]  Melena [K92.1]  Peptic ulcer disease [K27.9]  Proceduralist: Surgeons and Role:     * Aryan Nobles MD - Primary        SUBJECTIVE:   Cary Claudio is a 65 y.o. female who  has a past medical history of Anemia, Cardiac anomaly, congenital, Congenital heart anomaly, Fibroids, History of migraine, Hyperlipidemia, Hypertension, Insomnia, Nicotine dependence in remission, and PUD (peptic ulcer disease)..     she has a current medication list which includes the following long-term medication(s): amlodipine, chlorthalidone, nebivolol, olmesartan, pantoprazole, restasis, and triamcinolone.     ALLERGIES:     Review of patient's allergies indicates:   Allergen Reactions    Compazine [prochlorperazine] Swelling     Tongue swelling    Pcn [penicillins] Swelling     Tongue swelling    Lisinopril Other (See Comments)     cough     LDA:          Lines/Drains/Airways       None                  Anesthesia Evaluation      Airway   Mallampati: II  TM distance: Normal  Dental      Pulmonary    Cardiovascular   (+) hypertension    ECG reviewed  Rate: Normal    Neuro/Psych    (+) psychiatric history    GI/Hepatic/Renal    (+) PUD    Endo/Other    Abdominal                   MEDICATIONS:     Antibiotics (From  admission, onward)      None          VTE Risk Mitigation (From admission, onward)      None              Current Medications[1]       History:   There are no hospital problems to display for this patient.    Surgical History:    has a past surgical history that includes repair of congenital heart disease (1998); Hysterectomy (2014); Eye surgery; and Cardiac valve replacement (Hole in heart surgery 1998).   Social History:    reports that she is not currently sexually active and has had partner(s) who are male. She reports using the following method of birth control/protection: Post-menopausal.  reports that she quit smoking about 20 years ago. Her smoking use included cigarettes. She has never used smokeless tobacco. She reports current alcohol use of about 2.0 standard drinks of alcohol per week. She reports that she does not use drugs.     OBJECTIVE:     Vital Signs (Most Recent):    Vital Signs Range (Last 24H):  BP: ()/()   Arterial Line BP: ()/()        There is no height or weight on file to calculate BMI.   Wt Readings from Last 4 Encounters:   05/02/25 74.1 kg (163 lb 5.8 oz)   10/23/24 74.8 kg (164 lb 14.5 oz)   09/06/24 74.6 kg (164 lb 7.4 oz)   04/23/24 74.1 kg (163 lb 5.8 oz)       Significant Labs:  Lab Results   Component Value Date    WBC 7.73 01/29/2024    HGB 13.7 01/29/2024    HCT 41.5 01/29/2024     01/29/2024     09/20/2024    K 3.8 09/20/2024     09/20/2024    CREATININE 1.2 09/20/2024    BUN 28 (H) 09/20/2024    CO2 28 09/20/2024    GLU 98 09/20/2024    CALCIUM 9.9 09/20/2024    ALKPHOS 84 09/20/2024    ALT 12 09/20/2024    AST 19 09/20/2024    ALBUMIN 4.1 09/20/2024    HGBA1C 5.3 03/11/2024     Patient's last menstrual period was 12/16/2014 (approximate).  No results found for this or any previous visit (from the past 72 hours).    EKG:   Results for orders placed or performed in visit on 10/23/24   IN OFFICE EKG 12-LEAD (to [place])    Collection Time: 10/23/24 10:12 AM    Result Value Ref Range    QRS Duration 86 ms    [facility] QTC Calculation 456 ms    Narrative    Test Reason : I10,    Vent. Rate : 076 BPM     Atrial Rate : 076 BPM     P-R Int : 170 ms          QRS Dur : 086 ms      QT Int : 406 ms       P-R-T Axes : 060 039 063 degrees     QTc Int : 456 ms    Normal sinus rhythm  Normal ECG  When compared with ECG of 15-JUL-2022 09:01,  No significant change was found  Confirmed by Norm Hill MD ([id number]) on 10/23/2024 4:26:58 PM    Referred By: CLEMENTINA CREWS           Confirmed By:Norm Hill MD       TTE:  Results for orders placed or performed during the hospital encounter of 08/05/22   Echo   Result Value Ref Range    Ascending aorta 3.06 cm    STJ 2.56 cm    AV mean gradient 4 mmHg    Ao peak ludy 1.30 m/s    Ao VTI 23.32 cm    IVRT 131.30 msec    IVS 0.85 0.6 - 1.1 cm    LA size 3.00 cm    Left Atrium Major Axis 4.62 cm    Left Atrium Minor Axis 4.33 cm    LVIDd 3.69 3.5 - 6.0 cm    LVIDs 1.60 (A) 2.1 - 4.0 cm    LVOT diameter 1.98 cm    LVOT peak VTI 24.17 cm    PW 0.73 0.6 - 1.1 cm    MV Peak A Ludy 0.99 m/s    E wave deceleration time 189.31 msec    MV Peak E Ludy 0.55 m/s    PV Peak D Ludy 0.42 m/s    PV Peak S Ludy 0.89 m/s    RA Major Axis 4.36 cm    RA Width 3.25 cm    RVDD 2.49 cm    Sinus 2.97 cm    TR Max Ludy 2.65 m/s    TDI LATERAL 0.09 m/s    TDI SEPTAL 0.05 m/s    LA WIDTH 3.73 cm    MV stenosis pressure 1/2 time 54.90 ms    LV Diastolic Volume 57.69 mL    LV Systolic Volume 7.16 mL    LVOT peak ludy 1.15 m/s    LA Vol (MOD) 41.05 cm3    LV LATERAL E/E' RATIO 6.11 m/s    LV SEPTAL E/E' RATIO 11.00 m/s    FS 57 %    LA Vol 42.52 cm3    LV mass 80.57 g    Left Ventricle Relative Wall Thickness 0.40 cm    AV valve area 3.19 cm2    AV Velocity Ratio 0.88     AV index (prosthetic) 1.04     MV valve area p 1/2 method 4.01 cm2    E/A ratio 0.56     Mean e' 0.07 m/s    Pulm vein S/D ratio 2.12     LVOT area 3.1 cm2    LVOT stroke volume 74.38 cm3    AV peak gradient 7 mmHg     "E/E' ratio 7.86 m/s    Triscuspid Valve Regurgitation Peak Gradient 28 mmHg    BSA 1.75 m2    LV Systolic Volume Index 4.1 mL/m2    LV Diastolic Volume Index 33.35 mL/m2    RIVERA 24.6 mL/m2    LV Mass Index 47 g/m2    RIVERA (MOD) 23.7 mL/m2    EF 68 %    RV S' 11 cm/s    Narrative    · The left ventricle is normal in size with normal systolic function. The   estimated ejection fraction is 68%.  · Normal left ventricular diastolic function.  · Normal right ventricular size with normal right ventricular systolic   function.        EF   Date Value Ref Range Status   08/05/2022 68 % Final      No results found for this or any previous visit.  EDUARD:  No results found for this or any previous visit.  Stress Test:  No results found for this or any previous visit.     LHC:  No results found for this or any previous visit.     PFT:  No results found for: "FEV1", "FVC", "LXM2DPK", "TLC", "DLCO"     ASSESSMENT/PLAN:                                Pre-op Assessment    I have reviewed the Patient Summary Reports.    I have reviewed the NPO Status.   I have reviewed the Medications.     Review of Systems  Anesthesia Hx:  No previous Anesthesia             Denies Family Hx of Anesthesia complications.    Denies Personal Hx of Anesthesia complications.                    Social:  Former Smoker, Alcohol Use       Hematology/Oncology:  Hematology Normal   Oncology Normal                                   EENT/Dental:  EENT/Dental Normal           Cardiovascular:     Hypertension              ECG has been reviewed.                      Hypertension         Pulmonary:  Pulmonary Normal                       Renal/:  Renal/ Normal                 Hepatic/GI:   PUD,           Peptic Ulcer Disease          Musculoskeletal:  Musculoskeletal Normal                Neurological:  Neurology Normal                                      Endocrine:  Endocrine Normal            Dermatological:  Skin Normal    Psych:  Psychiatric History          "       Physical Exam  General: Well nourished    Airway:  Mallampati: II   Mouth Opening: Normal  TM Distance: Normal    Chest/Lungs:  Normal Respiratory Rate    Heart:  Rate: Normal    Anesthesia Plan  Type of Anesthesia, risks & benefits discussed:    Anesthesia Type: Gen Natural Airway  Intra-op Monitoring Plan: Standard ASA Monitors  Post Op Pain Control Plan: IV/PO Opioids PRN and multimodal analgesia  Induction:  IV  ASA Score: 3  Day of Surgery Review of History & Physical: H&P Update referred to the surgeon/provider.    Ready For Surgery From Anesthesia Perspective.     .             [1]  No current facility-administered medications for this encounter.     Current Outpatient Medications   Medication Sig Dispense Refill    amLODIPine (NORVASC) 10 MG tablet Take 1 tablet (10 mg total) by mouth once daily. 90 tablet 1    b complex vitamins tablet Take 1 tablet by mouth once daily.      chlorthalidone (HYGROTEN) 50 MG Tab Take 1 tablet (50 mg total) by mouth once daily. 90 tablet 1    magnesium 200 mg Tab Take by mouth once.      nebivoloL (BYSTOLIC) 10 MG Tab Take 1 tablet (10 mg total) by mouth once daily. 90 tablet 3    olmesartan (BENICAR) 40 MG tablet Take 1 tablet (40 mg total) by mouth once daily. 90 tablet 1    pantoprazole (PROTONIX) 40 MG tablet Take 1 tablet (40 mg total) by mouth once daily. 90 tablet 1    RESTASIS 0.05 % ophthalmic emulsion Place 1 drop into both eyes as needed.      sod sulf-pot chloride-mag sulf (SUTAB) 1.479-0.188- 0.225 gram tablet Take 12 tablets by mouth once daily. Take according to package instructions with indicated amount of water. 24 tablet 0    triamcinolone (KENALOG) 0.5 % ointment Apply topically 2 (two) times daily. Apply to affected area BID x 2 wks prn rash (Patient not taking: Reported on 9/6/2024) 15 g 0    vitamin D (VITAMIN D3) 1000 units Tab Take 1,000 Units by mouth once daily.      ZINC ORAL Take by mouth Daily.

## 2025-05-22 ENCOUNTER — TELEPHONE (OUTPATIENT)
Dept: ENDOSCOPY | Facility: HOSPITAL | Age: 65
End: 2025-05-22
Payer: MEDICARE

## 2025-05-22 NOTE — TELEPHONE ENCOUNTER
Called patient and advised that she can still continue her prep for her procedure tomorrow and it's ok to take her BP meds.

## 2025-05-22 NOTE — TELEPHONE ENCOUNTER
Called patient to confirm endoscopy appointment on 5/23/2025. All questions answered. Patient has prep and instructions. Appointment confirmed.

## 2025-05-22 NOTE — TELEPHONE ENCOUNTER
Cary Claudio to [facility] (supporting Charley Keys NP)  BT      5/22/25  9:35 AM  Good morning,   My blood pressure this morning before BP medicine was 142/78. Please let me know if I should continue the prep for my procedure tomorrow.

## 2025-05-23 ENCOUNTER — HOSPITAL ENCOUNTER (OUTPATIENT)
Facility: HOSPITAL | Age: 65
Discharge: HOME | End: 2025-05-23
Attending: INTERNAL MEDICINE | Admitting: INTERNAL MEDICINE
Payer: MEDICARE

## 2025-05-23 ENCOUNTER — ANESTHESIA (OUTPATIENT)
Dept: ENDOSCOPY | Facility: HOSPITAL | Age: 65
End: 2025-05-23
Payer: MEDICARE

## 2025-05-23 VITALS
OXYGEN SATURATION: 100 % | SYSTOLIC BLOOD PRESSURE: 164 MMHG | RESPIRATION RATE: 20 BRPM | DIASTOLIC BLOOD PRESSURE: 78 MMHG | TEMPERATURE: 98 F | HEART RATE: 70 BPM

## 2025-05-23 DIAGNOSIS — K92.1 MELENA: Primary | ICD-10-CM

## 2025-05-23 DIAGNOSIS — R19.4 BOWEL HABIT CHANGES: ICD-10-CM

## 2025-05-23 DIAGNOSIS — R10.84 GENERALIZED ABDOMINAL PAIN: ICD-10-CM

## 2025-05-23 DIAGNOSIS — K21.9 GASTROESOPHAGEAL REFLUX DISEASE, UNSPECIFIED WHETHER ESOPHAGITIS PRESENT: ICD-10-CM

## 2025-05-23 DIAGNOSIS — K27.9 PEPTIC ULCER DISEASE: ICD-10-CM

## 2025-05-23 DIAGNOSIS — R19.7 DIARRHEA, UNSPECIFIED TYPE: ICD-10-CM

## 2025-05-23 DIAGNOSIS — Z12.11 COLON CANCER SCREENING: ICD-10-CM

## 2025-05-23 PROCEDURE — 82657 ENZYME CELL ACTIVITY: CPT | Performed by: INTERNAL MEDICINE

## 2025-05-23 PROCEDURE — 45380 COLONOSCOPY AND BIOPSY: CPT | Performed by: INTERNAL MEDICINE

## 2025-05-23 PROCEDURE — 63600175 PHARM REV CODE 636 W HCPCS: Performed by: NURSE ANESTHETIST, CERTIFIED REGISTERED

## 2025-05-23 PROCEDURE — 37000008 HC ANESTHESIA 1ST 15 MINUTES: Performed by: INTERNAL MEDICINE

## 2025-05-23 PROCEDURE — 43239 EGD BIOPSY SINGLE/MULTIPLE: CPT | Performed by: INTERNAL MEDICINE

## 2025-05-23 PROCEDURE — 43239 EGD BIOPSY SINGLE/MULTIPLE: CPT | Mod: 51,HCNC,, | Performed by: INTERNAL MEDICINE

## 2025-05-23 PROCEDURE — 27201012 HC FORCEPS, HOT/COLD, DISP: Performed by: INTERNAL MEDICINE

## 2025-05-23 PROCEDURE — 88305 TISSUE EXAM BY PATHOLOGIST: CPT | Mod: TC,91,HCNC | Performed by: INTERNAL MEDICINE

## 2025-05-23 PROCEDURE — 25000003 PHARM REV CODE 250: Performed by: NURSE ANESTHETIST, CERTIFIED REGISTERED

## 2025-05-23 PROCEDURE — 37000009 HC ANESTHESIA EA ADD 15 MINS: Performed by: INTERNAL MEDICINE

## 2025-05-23 PROCEDURE — 45380 COLONOSCOPY AND BIOPSY: CPT | Mod: HCNC,,, | Performed by: INTERNAL MEDICINE

## 2025-05-23 PROCEDURE — 94761 N-INVAS EAR/PLS OXIMETRY MLT: CPT

## 2025-05-23 PROCEDURE — 25000003 PHARM REV CODE 250: Performed by: INTERNAL MEDICINE

## 2025-05-23 PROCEDURE — 99900035 HC TECH TIME PER 15 MIN (STAT)

## 2025-05-23 RX ORDER — SODIUM CHLORIDE 9 MG/ML
INJECTION, SOLUTION INTRAVENOUS CONTINUOUS
Status: DISCONTINUED | OUTPATIENT
Start: 2025-05-23 | End: 2025-05-23 | Stop reason: HOSPADM

## 2025-05-23 RX ORDER — LIDOCAINE HYDROCHLORIDE 10 MG/ML
INJECTION, SOLUTION INTRAVENOUS
Status: DISCONTINUED | OUTPATIENT
Start: 2025-05-23 | End: 2025-05-23

## 2025-05-23 RX ORDER — DEXMEDETOMIDINE HYDROCHLORIDE 100 UG/ML
INJECTION, SOLUTION INTRAVENOUS
Status: DISCONTINUED | OUTPATIENT
Start: 2025-05-23 | End: 2025-05-23

## 2025-05-23 RX ORDER — PROPOFOL 10 MG/ML
VIAL (ML) INTRAVENOUS
Status: DISCONTINUED | OUTPATIENT
Start: 2025-05-23 | End: 2025-05-23

## 2025-05-23 RX ORDER — PROPOFOL 10 MG/ML
VIAL (ML) INTRAVENOUS CONTINUOUS PRN
Status: DISCONTINUED | OUTPATIENT
Start: 2025-05-23 | End: 2025-05-23

## 2025-05-23 RX ADMIN — PROPOFOL 200 MCG/KG/MIN: 10 INJECTION, EMULSION INTRAVENOUS at 01:05

## 2025-05-23 RX ADMIN — LIDOCAINE HYDROCHLORIDE 10 MG: 10 INJECTION, SOLUTION INTRAVENOUS at 01:05

## 2025-05-23 RX ADMIN — LIDOCAINE HYDROCHLORIDE 50 MG: 10 INJECTION, SOLUTION INTRAVENOUS at 01:05

## 2025-05-23 RX ADMIN — PROPOFOL 10 MG: 10 INJECTION, EMULSION INTRAVENOUS at 02:05

## 2025-05-23 RX ADMIN — SODIUM CHLORIDE: 0.9 INJECTION, SOLUTION INTRAVENOUS at 01:05

## 2025-05-23 RX ADMIN — PROPOFOL 60 MG: 10 INJECTION, EMULSION INTRAVENOUS at 01:05

## 2025-05-23 RX ADMIN — DEXMEDETOMIDINE HYDROCHLORIDE 8 MCG: 100 INJECTION, SOLUTION INTRAVENOUS at 01:05

## 2025-05-23 NOTE — PROVATION PATIENT INSTRUCTIONS
Discharge Summary/Instructions after an Endoscopic Procedure  Patient Name: Cary Claudio  Patient MRN: [id number]  Patient YOB: 1960  Friday, May 23, 2025  Aryan Nobles MD  Dear patient,  As a result of recent federal legislation (The Federal Cures Act), you may   receive lab or pathology results from your procedure in your [facility]   account before your physician is able to contact you. Your physician or   their representative will relay the results to you with their   recommendations at their soonest availability.  Thank you,  RESTRICTIONS:  During your procedure today, you received medications for sedation.  These   medications may affect your judgment, balance and coordination.  Therefore,   for 24 hours, you have the following restrictions:   - DO NOT drive a car, operate machinery, make legal/financial decisions,   sign important papers or drink alcohol.    ACTIVITY:  Today: no heavy lifting, straining or running due to procedural   sedation/anesthesia.  The following day: return to full activity including work.  DIET:  Eat and drink normally unless instructed otherwise.     TREATMENT FOR COMMON SIDE EFFECTS:  - Mild abdominal pain, nausea, belching, bloating or excessive gas:  rest,   eat lightly and use a heating pad.  - Sore Throat: treat with throat lozenges and/or gargle with warm salt   water.  - Because air was used during the procedure, expelling large amounts of air   from your rectum or belching is normal.  - If a bowel prep was taken, you may not have a bowel movement for 1-3 days.    This is normal.  SYMPTOMS TO WATCH FOR AND REPORT TO YOUR PHYSICIAN:  1. Abdominal pain or bloating, other than gas cramps.  2. Chest pain.  3. Back pain.  4. Signs of infection such as: chills or fever occurring within 24 hours   after the procedure.  5. Rectal bleeding, which would show as bright red, maroon, or black stools.   (A tablespoon of blood from the rectum is not serious, especially if    hemorrhoids are present.)  6. Vomiting.  7. Weakness or dizziness.  GO DIRECTLY TO THE NEAREST EMERGENCY ROOM IF YOU HAVE ANY OF THE FOLLOWING:      Difficulty breathing              Chills and/or fever over 101 F   Persistent vomiting and/or vomiting blood   Severe abdominal pain   Severe chest pain   Black, tarry stools   Bleeding- more than one tablespoon   Any other symptom or condition that you feel may need urgent attention  Your doctor recommends these additional instructions:  If any biopsies were taken, your doctors clinic will contact you in 1 to 2   weeks with any results.  - Patient has a contact number available for emergencies.  The signs and   symptoms of potential delayed complications were discussed with the   patient.  Return to normal activities tomorrow.  Written discharge   instructions were provided to the patient.   - Discharge patient to home.   - Resume previous diet.   - Continue present medications.   - Await pathology results.   For questions, problems or results please call your physician - Aryan Nobles MD at Work:  [phone].  [facility], EMERGENCY ROOM PHONE NUMBER: [phone]  IF A COMPLICATION OR EMERGENCY SITUATION ARISES AND YOU ARE UNABLE TO REACH   YOUR PHYSICIAN - GO DIRECTLY TO THE EMERGENCY ROOM.  Aryan Nobles MD  5/23/2025 1:54:23 PM  This report has been verified and signed electronically.  Dear patient,  As a result of recent federal legislation (The Federal Cures Act), you may   receive lab or pathology results from your procedure in your [facility]   account before your physician is able to contact you. Your physician or   their representative will relay the results to you with their   recommendations at their soonest availability.  Thank you,  PROVATION

## 2025-05-23 NOTE — H&P
Short Stay Endoscopy History and Physical    PCP - Nicole Rich MD    Procedure - EGD/Colonoscopy  Sedation: GA  ASA - per anesthesia  Mallampati - per anesthesia  History of Anesthesia problems - no  Family history Anesthesia problems -  no     HPI:  This is a 65 y.o. female here for evaluation of : Melena and bowel changes    Reflux - no  Dysphagia - no  Abdominal pain - no  Diarrhea - no    ROS:  Constitutional: No fevers, chills, No weight loss  ENT: No allergies  CV: No chest pain  Pulm: No cough, No shortness of breath  Ophtho: No vision changes  GI: see HPI  Medical History:  has a past medical history of Anemia, Cardiac anomaly, congenital, Congenital heart anomaly, Fibroids, History of migraine, Hyperlipidemia, Hypertension, Insomnia, Nicotine dependence in remission, and PUD (peptic ulcer disease).    Surgical History:  has a past surgical history that includes repair of congenital heart disease (1998); Hysterectomy (2014); Eye surgery; and Cardiac valve replacement (Hole in heart surgery 1998).    Family History: family history includes Arthritis in her sister; Diabetes in her brother and brother; Heart attack (age of onset: 80) in her father; Heart attacks under age 50 (age of onset: 42) in her maternal grandfather; Heart disease (age of onset: 34) in her mother; Heart disease (age of onset: 70) in her father; Hypertension in her mother; Hypertrophic cardiomyopathy in her mother; Lung cancer in her brother; Rheum arthritis in her sister.. Otherwise no colon cancer, inflammatory bowel disease, or GI malignancies.    Social History:  reports that she quit smoking about 20 years ago. Her smoking use included cigarettes. She has never used smokeless tobacco. She reports current alcohol use of about 2.0 standard drinks of alcohol per week. She reports that she does not use drugs.    Review of patient's allergies indicates:   Allergen Reactions    Compazine [prochlorperazine] Swelling     Tongue  swelling    Pcn [penicillins] Swelling     Tongue swelling    Lisinopril Other (See Comments)     cough       Medications:   Prescriptions Prior to Admission[1]    Objective Findings:    Vital Signs: Per nursing notes.    Physical Exam:  General Appearance: Well appearing in no acute distress  Head:   Normocephalic, without obvious abnormality  Eyes:    No scleral icterus  Airway: Open  Neck: No restriction in mobility  Lungs: CTA bilaterally in anterior and posterior fields, no wheezes, no crackles.  Heart:  Regular rate and rhythm, S1, S2 normal, no murmurs heard  Abdomen: Soft, non tender, non distended      Labs:  Lab Results   Component Value Date    WBC 7.73 01/29/2024    HGB 13.7 01/29/2024    HCT 41.5 01/29/2024     01/29/2024    CHOL 237 (H) 09/20/2024    TRIG 138 09/20/2024    HDL 60 09/20/2024    ALT 12 09/20/2024    AST 19 09/20/2024     09/20/2024    K 3.8 09/20/2024     09/20/2024    CREATININE 1.2 09/20/2024    BUN 28 (H) 09/20/2024    CO2 28 09/20/2024    TSH 2.469 03/11/2024    HGBA1C 5.3 03/11/2024         I have explained the risks and benefits of endoscopy procedures to the patient including but not limited to bleeding, perforation, infection, and death.    Thank you so much for allowing me to participate in the care of Cary WAITE González Nobles MD         [1]   Medications Prior to Admission   Medication Sig Dispense Refill Last Dose/Taking    amLODIPine (NORVASC) 10 MG tablet Take 1 tablet (10 mg total) by mouth once daily. 90 tablet 1     b complex vitamins tablet Take 1 tablet by mouth once daily.       chlorthalidone (HYGROTEN) 50 MG Tab Take 1 tablet (50 mg total) by mouth once daily. 90 tablet 1     magnesium 200 mg Tab Take by mouth once.       nebivoloL (BYSTOLIC) 10 MG Tab Take 1 tablet (10 mg total) by mouth once daily. 90 tablet 3     olmesartan (BENICAR) 40 MG tablet Take 1 tablet (40 mg total) by mouth once daily. 90 tablet 1     pantoprazole (PROTONIX)  40 MG tablet Take 1 tablet (40 mg total) by mouth once daily. 90 tablet 1     RESTASIS 0.05 % ophthalmic emulsion Place 1 drop into both eyes as needed.       sod sulf-pot chloride-mag sulf (SUTAB) 1.479-0.188- 0.225 gram tablet Take 12 tablets by mouth once daily. Take according to package instructions with indicated amount of water. 24 tablet 0     triamcinolone (KENALOG) 0.5 % ointment Apply topically 2 (two) times daily. Apply to affected area BID x 2 wks prn rash (Patient not taking: Reported on 9/6/2024) 15 g 0     vitamin D (VITAMIN D3) 1000 units Tab Take 1,000 Units by mouth once daily.       ZINC ORAL Take by mouth Daily.

## 2025-05-23 NOTE — TRANSFER OF CARE
Anesthesia Transfer of Care Note    Patient: Cary Claudio    Procedure(s) Performed: Procedure(s) (LRB):  EGD (ESOPHAGOGASTRODUODENOSCOPY) (N/A)  COLONOSCOPY, SCREENING, LOW RISK PATIENT (N/A)    Patient location: PACU    Anesthesia Type: general    Transport from OR: Transported from OR on room air with adequate spontaneous ventilation    Post pain: adequate analgesia    Post assessment: no apparent anesthetic complications and tolerated procedure well    Post vital signs: stable    Level of consciousness: sedated    Nausea/Vomiting: no nausea/vomiting    Complications: none    Transfer of care protocol was followed      Last vitals: Visit Vitals  BP (!) 199/97 (BP Location: Left arm, Patient Position: Lying)   Pulse 85   Temp 37 °C (98.6 °F) (Temporal)   Resp 16   LMP 12/16/2014 (Approximate)   SpO2 100%   Breastfeeding No

## 2025-05-23 NOTE — PLAN OF CARE
Discharge instructions reviewed with patient with verbalization of understanding.  PIV removed with catheter intact.  VSS.  No acute complaints of pain or discomfort.  Patient ready for discharge

## 2025-05-23 NOTE — PROVATION PATIENT INSTRUCTIONS
Discharge Summary/Instructions after an Endoscopic Procedure  Patient Name: Cary Claudio  Patient MRN: [id number]  Patient YOB: 1960  Friday, May 23, 2025  Aryan Nobles MD  Dear patient,  As a result of recent federal legislation (The Federal Cures Act), you may   receive lab or pathology results from your procedure in your [facility]   account before your physician is able to contact you. Your physician or   their representative will relay the results to you with their   recommendations at their soonest availability.  Thank you,  RESTRICTIONS:  During your procedure today, you received medications for sedation.  These   medications may affect your judgment, balance and coordination.  Therefore,   for 24 hours, you have the following restrictions:   - DO NOT drive a car, operate machinery, make legal/financial decisions,   sign important papers or drink alcohol.    ACTIVITY:  Today: no heavy lifting, straining or running due to procedural   sedation/anesthesia.  The following day: return to full activity including work.  DIET:  Eat and drink normally unless instructed otherwise.     TREATMENT FOR COMMON SIDE EFFECTS:  - Mild abdominal pain, nausea, belching, bloating or excessive gas:  rest,   eat lightly and use a heating pad.  - Sore Throat: treat with throat lozenges and/or gargle with warm salt   water.  - Because air was used during the procedure, expelling large amounts of air   from your rectum or belching is normal.  - If a bowel prep was taken, you may not have a bowel movement for 1-3 days.    This is normal.  SYMPTOMS TO WATCH FOR AND REPORT TO YOUR PHYSICIAN:  1. Abdominal pain or bloating, other than gas cramps.  2. Chest pain.  3. Back pain.  4. Signs of infection such as: chills or fever occurring within 24 hours   after the procedure.  5. Rectal bleeding, which would show as bright red, maroon, or black stools.   (A tablespoon of blood from the rectum is not serious, especially if    hemorrhoids are present.)  6. Vomiting.  7. Weakness or dizziness.  GO DIRECTLY TO THE NEAREST EMERGENCY ROOM IF YOU HAVE ANY OF THE FOLLOWING:      Difficulty breathing              Chills and/or fever over 101 F   Persistent vomiting and/or vomiting blood   Severe abdominal pain   Severe chest pain   Black, tarry stools   Bleeding- more than one tablespoon   Any other symptom or condition that you feel may need urgent attention  Your doctor recommends these additional instructions:  If any biopsies were taken, your doctors clinic will contact you in 1 to 2   weeks with any results.  - Patient has a contact number available for emergencies.  The signs and   symptoms of potential delayed complications were discussed with the   patient.  Return to normal activities tomorrow.  Written discharge   instructions were provided to the patient.   - Discharge patient to home.   - Resume previous diet.   - Continue present medications.   - Await pathology results.   - Repeat colonoscopy is not recommended for screening purposes.   For questions, problems or results please call your physician - Aryan Nobles MD at Work:  [phone].  [facility], EMERGENCY ROOM PHONE NUMBER: [phone]  IF A COMPLICATION OR EMERGENCY SITUATION ARISES AND YOU ARE UNABLE TO REACH   YOUR PHYSICIAN - GO DIRECTLY TO THE EMERGENCY ROOM.  Aryan Nobles MD  5/23/2025 2:12:26 PM  This report has been verified and signed electronically.  Dear patient,  As a result of recent federal legislation (The Federal Cures Act), you may   receive lab or pathology results from your procedure in your [facility]   account before your physician is able to contact you. Your physician or   their representative will relay the results to you with their   recommendations at their soonest availability.  Thank you,  PROVATION

## 2025-05-23 NOTE — ANESTHESIA POSTPROCEDURE EVALUATION
Anesthesia Post Evaluation    Patient: Cary Claudio    Procedure(s) Performed: Procedure(s) (LRB):  EGD (ESOPHAGOGASTRODUODENOSCOPY) (N/A)  COLONOSCOPY, SCREENING, LOW RISK PATIENT (N/A)    Final Anesthesia Type: general      Patient location during evaluation: PACU  Patient participation: Yes- Able to Participate  Level of consciousness: awake and alert  Post-procedure vital signs: reviewed and stable  Pain management: adequate  Airway patency: patent    PONV status at discharge: No PONV  Anesthetic complications: no      Cardiovascular status: stable  Respiratory status: spontaneous ventilation  Hydration status: euvolemic  Follow-up not needed.          Vitals Value Taken Time   /78 05/23/25 14:38   Temp 36.4 °C (97.5 °F) 05/23/25 14:13   Pulse 52 05/23/25 14:38   Resp 37 05/23/25 14:38   SpO2 100 % 05/23/25 14:38   Vitals shown include unfiled device data.      Event Time   Out of Recovery 14:28:00         Pain/Vaughn Score: Vaughn Score: 10 (5/23/2025  2:28 PM)

## 2025-05-23 NOTE — PLAN OF CARE
Pt oriented to room and POC, admit assessment performed, verbalizes understanding, instructed on call bell, call bell in reach, bed in low locked position, pt verbalized comfort, no distress noted

## 2025-05-27 LAB
ACID A-GLUCOSIDASE TSMI-CCNT: 33.3 NMOL/MIN/MG PROT
DISACCHARIDASES TSMI-IMP: ABNORMAL
ESTROGEN SERPL-MCNC: ABNORMAL PG/ML
GLUCAN 1,4-ALPHA-GLUCOSIDASE TSMI-CCNT: 2.8 NMOL/MIN/MG PROT
INSULIN SERPL-ACNC: ABNORMAL U[IU]/ML
LAB AP CLINICAL INFORMATION: ABNORMAL
LAB AP DIAGNOSIS CATEGORY: ABNORMAL
LAB AP GROSS DESCRIPTION: ABNORMAL
LAB AP PERFORMING LOCATION(S): ABNORMAL
LAB AP REPORT FOOTNOTES: ABNORMAL
LACTASE TSMI-CCNT: <0.4 NMOL/MIN/MG PROT
PALATINASE TSMI-CCNT: 1.9 NMOL/MIN/MG PROT
PROVIDER SIGNING NAME: ABNORMAL
SUCRASE TSMI-CCNT: 9.3 NMOL/MIN/MG PROT

## 2025-05-29 ENCOUNTER — RESULTS FOLLOW-UP (OUTPATIENT)
Dept: GASTROENTEROLOGY | Facility: CLINIC | Age: 65
End: 2025-05-29

## 2025-05-29 ENCOUNTER — PATIENT MESSAGE (OUTPATIENT)
Dept: GASTROENTEROLOGY | Facility: CLINIC | Age: 65
End: 2025-05-29
Payer: MEDICARE

## 2025-05-30 ENCOUNTER — TELEPHONE (OUTPATIENT)
Dept: GASTROENTEROLOGY | Facility: CLINIC | Age: 65
End: 2025-05-30
Payer: MEDICARE

## 2025-05-30 NOTE — TELEPHONE ENCOUNTER
----- Message from Aryan Nobles MD sent at 5/29/2025  2:42 PM CDT -----  The reading on the disaccharidase is not accurate since the specimen may have been crushed. Could take 2 [employer] Enzymes capsules after each meal to help with digestion of food.  ----- Message -----  From: Stefan Carney MA  Sent: 5/29/2025   2:22 PM CDT  To: Aryan Nobles MD    I communicated results to patient, patient VU, patient did inquiry on the dissacharidase test and is asking if theres any comment on that.  ----- Message -----  From: Aryan Nobles MD  Sent: 5/29/2025   2:11 PM CDT  To: Giuliano Norman    Nothing concerning on biopsies. Follow up with MsValeriaLudmila in clinic.  ----- Message -----  From: Lab, Background User  Sent: 5/27/2025   1:35 PM CDT  To: Aryan Nobles MD

## 2025-06-03 DIAGNOSIS — R19.4 BOWEL HABIT CHANGES: Primary | ICD-10-CM

## 2025-06-25 ENCOUNTER — PATIENT MESSAGE (OUTPATIENT)
Dept: ADMINISTRATIVE | Facility: HOSPITAL | Age: 65
End: 2025-06-25
Payer: MEDICARE

## 2025-07-21 ENCOUNTER — TELEPHONE (OUTPATIENT)
Dept: FAMILY MEDICINE | Facility: CLINIC | Age: 65
End: 2025-07-21
Payer: MEDICARE

## 2025-07-21 NOTE — TELEPHONE ENCOUNTER
Spoke w/ pt. Advised MD is not currently taking new pt's. Offered to sched w/ vivi provider. She states she is wanting a holistic doctor and is going to do a little more research. Will call back if she chooses to sched w/ one of our MD's that are taking new pts.

## 2025-07-21 NOTE — TELEPHONE ENCOUNTER
Copied from CRM #[id number]. Topic: Appointments - Appointment Access  >> Jul 21, 2025  1:22 PM Ivanna wrote:  Type:  Needs Medical Advice    Who Called:self  Symptoms (please be specific): pt is wanting to est care with provider but didn't see availability    Would the patient rather a call back or a response via [employer]? call  Best Call Back Number: [phone] (home)    Additional Information: please advise and thank you/.

## 2025-07-29 ENCOUNTER — OFFICE VISIT (OUTPATIENT)
Dept: ALLERGY | Facility: CLINIC | Age: 65
End: 2025-07-29
Payer: MEDICARE

## 2025-07-29 VITALS — HEIGHT: 64 IN | WEIGHT: 169.31 LBS | BODY MASS INDEX: 28.91 KG/M2

## 2025-07-29 DIAGNOSIS — R19.7 DIARRHEA, UNSPECIFIED TYPE: ICD-10-CM

## 2025-07-29 DIAGNOSIS — E73.9 LACTOSE INTOLERANCE: Primary | ICD-10-CM

## 2025-07-29 DIAGNOSIS — R10.9 ABDOMINAL CRAMPING: ICD-10-CM

## 2025-07-29 PROCEDURE — 1126F AMNT PAIN NOTED NONE PRSNT: CPT | Mod: CPTII,S$GLB,, | Performed by: STUDENT IN AN ORGANIZED HEALTH CARE EDUCATION/TRAINING PROGRAM

## 2025-07-29 PROCEDURE — 1159F MED LIST DOCD IN RCRD: CPT | Mod: CPTII,S$GLB,, | Performed by: STUDENT IN AN ORGANIZED HEALTH CARE EDUCATION/TRAINING PROGRAM

## 2025-07-29 PROCEDURE — 1160F RVW MEDS BY RX/DR IN RCRD: CPT | Mod: CPTII,S$GLB,, | Performed by: STUDENT IN AN ORGANIZED HEALTH CARE EDUCATION/TRAINING PROGRAM

## 2025-07-29 PROCEDURE — 4010F ACE/ARB THERAPY RXD/TAKEN: CPT | Mod: CPTII,S$GLB,, | Performed by: STUDENT IN AN ORGANIZED HEALTH CARE EDUCATION/TRAINING PROGRAM

## 2025-07-29 PROCEDURE — 99204 OFFICE O/P NEW MOD 45 MIN: CPT | Mod: S$GLB,,, | Performed by: STUDENT IN AN ORGANIZED HEALTH CARE EDUCATION/TRAINING PROGRAM

## 2025-07-29 PROCEDURE — 3008F BODY MASS INDEX DOCD: CPT | Mod: CPTII,S$GLB,, | Performed by: STUDENT IN AN ORGANIZED HEALTH CARE EDUCATION/TRAINING PROGRAM

## 2025-07-29 PROCEDURE — 99999 PR PBB SHADOW E&M-EST. PATIENT-LVL III: CPT | Mod: PBBFAC,,, | Performed by: STUDENT IN AN ORGANIZED HEALTH CARE EDUCATION/TRAINING PROGRAM

## 2025-07-29 NOTE — PROGRESS NOTES
ALLERGY & IMMUNOLOGY CLINIC - INITIAL CONSULTATION      HISTORY OF PRESENT ILLNESS     Patient ID: Cary Claudio is a 65 y.o. female    CC: diarrhea, abdominal cramps     HPI: Cary Claudio is a 65 y.o. female presenting for diarrhea and abdominal cramps.   Patient was referred by Charley Keys NP (GI).    She says she is trying to figure out what foods cause the issue.  She gets abdominal cramps and diarrhea.  She says its not all the time. She says it happens consistently with dairy, but sometimes she doesn't know what brings it on.  She doesn't know if gluten could be trigger (hasn't noticed this to be the case).   She says that she had GI workup, but one of the tests was corrupted.   She is avoiding dairy now. She does drink lactose-free dairy and does okay with this. She hasn't tried lactase tablets.     Usually the diarrhea is immediate, within in hour of eating. Sometimes it resolves quickly, but sometimes it goes on for a couple of hours off and on.  She might feel nauseated, but no vomiting.  Besides the dairy, she hasn't identified a specific trigger.  It could be after any meal (breakfast, lunch, or dinner).  If avoiding dairy, it might happen once per week.    No respiratory or cutaneous symptoms with these episodes. Sometimes headaches.  No history of classic food allergy.     MEDICAL HISTORY     Vaccines:   Immunization History   Administered Date(s) Administered    COVID-19, vector-nr, rS-Ad26, PF (Jorge) 03/10/2021    Influenza - Quadrivalent 10/08/2014    Influenza - Quadrivalent - PF *Preferred* (6 months and older) 02/15/2022    Pneumococcal Conjugate - 20 Valent 04/10/2023    Tdap 05/19/2020    Zoster 10/08/2014    Zoster Recombinant 06/28/2021, 10/27/2021     Medical Hx:   Problem List[1]    Surgical Hx:   Past Surgical History:   Procedure Laterality Date    CARDIAC VALVE REPLACEMENT  Hole in heart surgery 1998    COLONOSCOPY, SCREENING, LOW RISK PATIENT N/A 5/23/2025    Procedure:  "COLONOSCOPY, SCREENING, LOW RISK PATIENT;  Surgeon: Aryan Nobles MD;  Location: [facility] ENDOSCOPY;  Service: Endoscopy;  Laterality: N/A;    ESOPHAGOGASTRODUODENOSCOPY N/A 5/23/2025    Procedure: EGD (ESOPHAGOGASTRODUODENOSCOPY);  Surgeon: Aryan Nobles MD;  Location: [facility] ENDOSCOPY;  Service: Endoscopy;  Laterality: N/A;  Referred by Charley Keys Np;[employer];Instructions sent to portal;MLB  5.22 precall complete; AP    EYE SURGERY      HYSTERECTOMY  2014    repair of congenital heart disease  1998    Repair of ASD     Medications:   Medications Ordered Prior to Encounter[2]    Drug Allergies:   Review of patient's allergies indicates:   Allergen Reactions    Compazine [prochlorperazine] Swelling     Tongue swelling    Pcn [penicillins] Swelling     Tongue swelling    Lisinopril Other (See Comments)     cough     Social Hx:   Social History[3]    Family Hx:   Family History   Problem Relation Name Age of Onset    Heart disease Mother Merle 34    Hypertension Mother Merle     Hypertrophic cardiomyopathy Mother Merle     Heart disease Father Jacobo 70    Heart attack Father Jacobo 80    Rheum arthritis Sister      Diabetes Brother      Lung cancer Brother          mets to brain    Heart attacks under age 50 Maternal Grandfather  42    Arthritis Sister Kathi     Diabetes Brother      Breast cancer Neg Hx      Colon cancer Neg Hx      Ovarian cancer Neg Hx      Stroke Neg Hx        PHYSICAL EXAM     VS: Ht 5' 3.75" (1.619 m)   Wt 76.8 kg (169 lb 5.4 oz)   LMP 12/16/2014 (Approximate)   BMI 29.29 kg/m²   GENERAL: Alert, NAD, well-appearing  EYES: EOMI, no conjunctival injection, no discharge, no infraorbital shiners  LUNGS: normal effort, no increased WOB  DERM: no rashes     LABORATORY STUDIES     Component      Latest Ref Rng 1/29/2024 3/11/2024   WBC      3.90 - 12.70 K/uL 7.73     RBC      4.00 - 5.40 M/uL 4.74     Hemoglobin      12.0 - 16.0 g/dL 13.7     Hematocrit      37.0 - 48.5 % 41.5   " "  MCV      82 - 98 fL 88     MCH      27.0 - 31.0 pg 28.9     MCHC      32.0 - 36.0 g/dL 33.0     RDW      11.5 - 14.5 % 12.3     Platelet Count      150 - 450 K/uL 277     MPV      9.2 - 12.9 fL 12.1     Immature Granulocytes      0.0 - 0.5 % 0.1     Gran # (ANC)      1.8 - 7.7 K/uL 4.6     Immature Grans (Abs)      0.00 - 0.04 K/uL 0.01     Lymph #      1.0 - 4.8 K/uL 2.2     Mono #      0.3 - 1.0 K/uL 0.7     Eos #      0.0 - 0.5 K/uL 0.1     Baso #      0.00 - 0.20 K/uL 0.06     Differential Method Automated     Sodium      136 - 145 mmol/L  141    Potassium      3.5 - 5.1 mmol/L  3.9    Chloride      95 - 110 mmol/L  103    CO2      23 - 29 mmol/L  27    Glucose      70 - 110 mg/dL  99    BUN      8 - 23 mg/dL  21    Creatinine      0.5 - 1.4 mg/dL  1.1    Calcium      8.7 - 10.5 mg/dL  9.8    PROTEIN TOTAL      6.0 - 8.4 g/dL  7.5    Albumin      3.5 - 5.2 g/dL  4.1    BILIRUBIN TOTAL      0.1 - 1.0 mg/dL  0.3    ALP      55 - 135 U/L  88    AST      10 - 40 U/L  21    ALT      10 - 44 U/L  19    eGFR      >60 mL/min/1.73 m^2  55.4 !    Anion Gap      8 - 16 mmol/L  11    Hemoglobin A1C External      4.0 - 5.6 %  5.3    Estimated Avg Glucose      68 - 131 mg/dL  105    TSH      0.400 - 4.000 uIU/mL  2.469      Component      Latest Ref Rng 1/29/2024 1/30/2024   HLA B27 Result  Negative    Sed Rate      0 - 36 mm/Hr 11     CRP      0.0 - 8.2 mg/L 5.3     CARMEN Screen      Negative <1:80  Negative <1:80     CCP Antibodies      <5.0 U/mL 0.6     Rheumatoid Factor      0.0 - 15.0 IU/mL <13.0        IMAGING & OTHER DIAGNOSTICS     GI pathology 5/23/25:  See the report for the full pathology read, but it notes that "villi in intraepithelial lymphocytes are within normal limits (negative for evidence of Celiac disease)" and that there is "mild active colitis."     CHART REVIEW     Reviewed GI note, labs, scope reports, pathology.      ASSESSMENT & PLAN     Cary Claudio is a 65 y.o. female with     # Lactose " intolerance: Her disaccharidase activity panel specimen was compromised (so results are apparently not reliable); however, it does sound like she has lactose intolerance based on history. She reports some unexplained symptoms (see below), but lactose is a consistent trigger. We discussed the underlying mechanisms of lactose intolerance and options for managing, such as lactose-free diet, choosing lactose-free dairy products, and/or using lactase tablets. We discussed that different dairy products contain different amounts of lactose.     # Diarrhea and abdominal cramps: Even with avoiding lactose, she can still get symptoms about once per week. Usually occurs within an hour of eating, but no consistent trigger (other than lactose as above). Counseled that her history isn't consistent with IgE-mediated food allergy or FPIES. Explained why food allergy testing wouldn't be helpful in evaluating her symptoms. Her scope biopsies were negative for evidence of celiac disease, but there was evidence of mild colitis. Recommend she continue to follow with GI.     Follow up: as needed    I spent a total of 55 minutes on the day of the visit.  This includes face to face time and non-face to face time preparing to see the patient (eg, review of tests), obtaining and/or reviewing separately obtained history, documenting clinical information in the electronic or other health record, independently interpreting results and communicating results to the patient/family/caregiver, or care coordinator.    Marcy Torres MD  Allergy/Immunology         [1]   Patient Active Problem List  Diagnosis    Uterine fibroid    Duodenal ulcer    Hypertension    Anemia    Screening for colon cancer    Hypertensive urgency    Overweight (BMI 25.0-29.9)    Congenital heart anomaly    Postmenopausal    Need for Tdap vaccination    Exposure to COVID-19 virus    Need for hepatitis C screening test    Hyperlipidemia    Sore throat    Metabolic syndrome     Osteopenia    ASD (atrial septal defect) s/p surgical repair    Pedal edema    Unexplained weight gain    Osteoporosis    IFG (impaired fasting glucose)   [2]   Current Outpatient Medications on File Prior to Visit   Medication Sig Dispense Refill    b complex vitamins tablet Take 1 tablet by mouth once daily.      chlorthalidone (HYGROTEN) 50 MG Tab Take 1 tablet (50 mg total) by mouth once daily. 90 tablet 1    magnesium 200 mg Tab Take by mouth once.      nebivoloL (BYSTOLIC) 10 MG Tab Take 1 tablet (10 mg total) by mouth once daily. 90 tablet 3    olmesartan (BENICAR) 40 MG tablet Take 1 tablet (40 mg total) by mouth once daily. 90 tablet 1    pantoprazole (PROTONIX) 40 MG tablet Take 1 tablet (40 mg total) by mouth once daily. 90 tablet 1    vitamin D (VITAMIN D3) 1000 units Tab Take 1,000 Units by mouth once daily.      ZINC ORAL Take by mouth Daily.      RESTASIS 0.05 % ophthalmic emulsion Place 1 drop into both eyes as needed. (Patient not taking: Reported on 2025)       No current facility-administered medications on file prior to visit.   [3]   Social History  Tobacco Use    Smoking status: Former     Current packs/day: 0.00     Types: Cigarettes     Quit date: 2004     Years since quittin.0    Smokeless tobacco: Never    Tobacco comments:     Did not smoke that much. Never now   Substance Use Topics    Alcohol use: Yes     Alcohol/week: 2.0 standard drinks of alcohol     Types: 2 Glasses of wine per week     Comment: rare    Drug use: No

## 2025-07-31 DIAGNOSIS — I10 HYPERTENSION, UNSPECIFIED TYPE: ICD-10-CM

## 2025-07-31 RX ORDER — OLMESARTAN MEDOXOMIL 40 MG/1
40 TABLET ORAL DAILY
Qty: 90 TABLET | Refills: 0 | Status: SHIPPED | OUTPATIENT
Start: 2025-07-31 | End: 2026-07-31

## 2025-07-31 NOTE — TELEPHONE ENCOUNTER
Refill Routing Note   Medication(s) are not appropriate for processing by [facility] for the following reason(s):        Required vitals abnormal    [facility] action(s):  Defer     Requires labs : Yes             Appointments  past 12m or future 3m with PCP    Date Provider   Last Visit   9/6/2024 Nicole Rich MD   Next Visit   9/22/2025 Nicole Rich MD   ED visits in past 90 days: 0        Note composed:3:59 PM 07/31/2025

## 2025-07-31 NOTE — TELEPHONE ENCOUNTER
Care Due:                  Date            Visit Type   Department     Provider  --------------------------------------------------------------------------------                                EP -                              PRIMARY      [facility] PRIMARY  Last Visit: 09-      CARE ([facility])   CARE           neftali Martineze                              EP -                              PRIMARY      [facility] PRIMARY  Next Visit: 09-      CARE ([facility])   CARE             [facility]                                                            Last  Test          Frequency    Reason                     Performed    Due Date  --------------------------------------------------------------------------------    CMP.........  12 months..  chlorthalidone,            09-   09-                             olmesartan...............    [facility] Embedded Care Due Messages. Reference number: [id number].   7/31/2025 12:47:36 PM CDT

## 2025-09-02 ENCOUNTER — TELEPHONE (OUTPATIENT)
Dept: OPHTHALMOLOGY | Facility: CLINIC | Age: 65
End: 2025-09-02
Payer: MEDICARE